# Patient Record
Sex: MALE | Race: BLACK OR AFRICAN AMERICAN | Employment: UNEMPLOYED | URBAN - METROPOLITAN AREA
[De-identification: names, ages, dates, MRNs, and addresses within clinical notes are randomized per-mention and may not be internally consistent; named-entity substitution may affect disease eponyms.]

---

## 2020-01-15 ENCOUNTER — APPOINTMENT (OUTPATIENT)
Dept: GENERAL RADIOLOGY | Age: 45
DRG: 890 | End: 2020-01-15
Payer: MEDICAID

## 2020-01-15 ENCOUNTER — APPOINTMENT (OUTPATIENT)
Dept: ULTRASOUND IMAGING | Age: 45
DRG: 890 | End: 2020-01-15
Payer: MEDICAID

## 2020-01-15 ENCOUNTER — HOSPITAL ENCOUNTER (INPATIENT)
Age: 45
LOS: 22 days | Discharge: ANOTHER HOSPITAL | DRG: 890 | End: 2020-02-06
Attending: EMERGENCY MEDICINE | Admitting: STUDENT IN AN ORGANIZED HEALTH CARE EDUCATION/TRAINING PROGRAM
Payer: MEDICAID

## 2020-01-15 ENCOUNTER — APPOINTMENT (OUTPATIENT)
Dept: INTERVENTIONAL RADIOLOGY/VASCULAR | Age: 45
DRG: 890 | End: 2020-01-15
Payer: MEDICAID

## 2020-01-15 PROBLEM — N17.9 ACUTE RENAL FAILURE (HCC): Status: ACTIVE | Noted: 2020-01-15

## 2020-01-15 LAB
ALBUMIN SERPL-MCNC: 1.6 GM/DL (ref 3.4–5)
ALP BLD-CCNC: 52 IU/L (ref 40–129)
ALT SERPL-CCNC: 12 U/L (ref 10–40)
AMPHETAMINES: NEGATIVE
ANION GAP SERPL CALCULATED.3IONS-SCNC: 13 MMOL/L (ref 4–16)
ANION GAP SERPL CALCULATED.3IONS-SCNC: 16 MMOL/L (ref 4–16)
AST SERPL-CCNC: 21 IU/L (ref 15–37)
BACTERIA: NEGATIVE /HPF
BARBITURATE SCREEN URINE: NEGATIVE
BASOPHILS ABSOLUTE: 0 K/CU MM
BASOPHILS RELATIVE PERCENT: 0.2 % (ref 0–1)
BENZODIAZEPINE SCREEN, URINE: NEGATIVE
BILIRUB SERPL-MCNC: 0.1 MG/DL (ref 0–1)
BILIRUBIN URINE: NEGATIVE MG/DL
BLOOD, URINE: NEGATIVE
BUN BLDV-MCNC: 38 MG/DL (ref 6–23)
BUN BLDV-MCNC: 67 MG/DL (ref 6–23)
CALCIUM SERPL-MCNC: 7.4 MG/DL (ref 8.3–10.6)
CALCIUM SERPL-MCNC: 7.8 MG/DL (ref 8.3–10.6)
CANNABINOID SCREEN URINE: NEGATIVE
CHLORIDE BLD-SCNC: 102 MMOL/L (ref 99–110)
CHLORIDE BLD-SCNC: 104 MMOL/L (ref 99–110)
CHLORIDE URINE RANDOM: 36 MMOL/L (ref 43–210)
CHOLESTEROL: 212 MG/DL
CLARITY: CLEAR
CO2: 16 MMOL/L (ref 21–32)
CO2: 24 MMOL/L (ref 21–32)
COCAINE METABOLITE: NEGATIVE
COLOR: YELLOW
CREAT SERPL-MCNC: 10.9 MG/DL (ref 0.9–1.3)
CREAT SERPL-MCNC: 5.9 MG/DL (ref 0.9–1.3)
CREATININE URINE: 105.7 MG/DL (ref 39–259)
DIFFERENTIAL TYPE: ABNORMAL
EOSINOPHILS ABSOLUTE: 0 K/CU MM
EOSINOPHILS RELATIVE PERCENT: 0 % (ref 0–3)
ESTIMATED AVERAGE GLUCOSE: 128 MG/DL
GFR AFRICAN AMERICAN: 13 ML/MIN/1.73M2
GFR AFRICAN AMERICAN: 6 ML/MIN/1.73M2
GFR NON-AFRICAN AMERICAN: 11 ML/MIN/1.73M2
GFR NON-AFRICAN AMERICAN: 5 ML/MIN/1.73M2
GLUCOSE BLD-MCNC: 110 MG/DL (ref 70–99)
GLUCOSE BLD-MCNC: 112 MG/DL (ref 70–99)
GLUCOSE, URINE: 150 MG/DL
HAV IGM SER IA-ACNC: NON REACTIVE
HBA1C MFR BLD: 6.1 % (ref 4.2–6.3)
HBV SURFACE AB TITR SER: 88.13 {TITER}
HCT VFR BLD CALC: 36.3 % (ref 42–52)
HDLC SERPL-MCNC: 19 MG/DL
HEMOGLOBIN: 10 GM/DL (ref 13.5–18)
HEPATITIS B CORE IGM ANTIBODY: NON REACTIVE
HEPATITIS B SURFACE ANTIGEN: NON REACTIVE
HEPATITIS C ANTIBODY: NON REACTIVE
HYALINE CASTS: 5 /LPF
IMMATURE NEUTROPHIL %: 0.4 % (ref 0–0.43)
INR BLD: 0.97 INDEX
KETONES, URINE: ABNORMAL MG/DL
LACTATE: 1.3 MMOL/L (ref 0.4–2)
LDL CHOLESTEROL DIRECT: 116 MG/DL
LEUKOCYTE ESTERASE, URINE: NEGATIVE
LYMPHOCYTES ABSOLUTE: 0.4 K/CU MM
LYMPHOCYTES RELATIVE PERCENT: 7 % (ref 24–44)
MCH RBC QN AUTO: 22.7 PG (ref 27–31)
MCHC RBC AUTO-ENTMCNC: 27.5 % (ref 32–36)
MCV RBC AUTO: 82.5 FL (ref 78–100)
MONOCYTES ABSOLUTE: 0.1 K/CU MM
MONOCYTES RELATIVE PERCENT: 1.8 % (ref 0–4)
MUCUS: ABNORMAL HPF
NITRITE URINE, QUANTITATIVE: NEGATIVE
NUCLEATED RBC %: 0 %
OPIATES, URINE: NEGATIVE
OXYCODONE: NORMAL
PDW BLD-RTO: 19.9 % (ref 11.7–14.9)
PH, URINE: 7 (ref 5–8)
PHENCYCLIDINE, URINE: NEGATIVE
PLATELET # BLD: 286 K/CU MM (ref 140–440)
PMV BLD AUTO: 10.2 FL (ref 7.5–11.1)
POTASSIUM SERPL-SCNC: 4.4 MMOL/L (ref 3.5–5.1)
POTASSIUM SERPL-SCNC: ABNORMAL MMOL/L (ref 3.5–5.1)
POTASSIUM, UR: 53.3 MMOL/L (ref 22–119)
PROT/CREAT RATIO, UR: >5.7
PROTEIN UA: >500 MG/DL
PROTHROMBIN TIME: 11.7 SECONDS (ref 11.7–14.5)
RAPID INFLUENZA  B AGN: NEGATIVE
RAPID INFLUENZA A AGN: NEGATIVE
RBC # BLD: 4.4 M/CU MM (ref 4.6–6.2)
RBC URINE: 1 /HPF (ref 0–3)
SEGMENTED NEUTROPHILS ABSOLUTE COUNT: 5.1 K/CU MM
SEGMENTED NEUTROPHILS RELATIVE PERCENT: 90.6 % (ref 36–66)
SODIUM BLD-SCNC: 136 MMOL/L (ref 135–145)
SODIUM BLD-SCNC: 139 MMOL/L (ref 135–145)
SODIUM URINE: 44 MMOL/L (ref 35–167)
SPECIFIC GRAVITY UA: 1.02 (ref 1–1.03)
SQUAMOUS EPITHELIAL: 1 /HPF
TOTAL IMMATURE NEUTOROPHIL: 0.02 K/CU MM
TOTAL NUCLEATED RBC: 0 K/CU MM
TOTAL PROTEIN: 5.9 GM/DL (ref 6.4–8.2)
TRANSITIONAL EPITHELIAL: <1 /HPF
TRICHOMONAS: ABNORMAL /HPF
TRIGL SERPL-MCNC: 345 MG/DL
URINE TOTAL PROTEIN: >600 MG/DL
UROBILINOGEN, URINE: NORMAL MG/DL (ref 0.2–1)
WBC # BLD: 5.6 K/CU MM (ref 4–10.5)
WBC UA: 2 /HPF (ref 0–2)

## 2020-01-15 PROCEDURE — 83605 ASSAY OF LACTIC ACID: CPT

## 2020-01-15 PROCEDURE — 84300 ASSAY OF URINE SODIUM: CPT

## 2020-01-15 PROCEDURE — 96374 THER/PROPH/DIAG INJ IV PUSH: CPT

## 2020-01-15 PROCEDURE — 82570 ASSAY OF URINE CREATININE: CPT

## 2020-01-15 PROCEDURE — 84165 PROTEIN E-PHORESIS SERUM: CPT

## 2020-01-15 PROCEDURE — 94640 AIRWAY INHALATION TREATMENT: CPT

## 2020-01-15 PROCEDURE — 85610 PROTHROMBIN TIME: CPT

## 2020-01-15 PROCEDURE — 86038 ANTINUCLEAR ANTIBODIES: CPT

## 2020-01-15 PROCEDURE — 84520 ASSAY OF UREA NITROGEN: CPT

## 2020-01-15 PROCEDURE — 86701 HIV-1ANTIBODY: CPT

## 2020-01-15 PROCEDURE — 81001 URINALYSIS AUTO W/SCOPE: CPT

## 2020-01-15 PROCEDURE — C1894 INTRO/SHEATH, NON-LASER: HCPCS

## 2020-01-15 PROCEDURE — 2580000003 HC RX 258: Performed by: INTERNAL MEDICINE

## 2020-01-15 PROCEDURE — 2500000003 HC RX 250 WO HCPCS: Performed by: INTERNAL MEDICINE

## 2020-01-15 PROCEDURE — 6360000002 HC RX W HCPCS: Performed by: PHYSICIAN ASSISTANT

## 2020-01-15 PROCEDURE — 85025 COMPLETE CBC W/AUTO DIFF WBC: CPT

## 2020-01-15 PROCEDURE — 86704 HEP B CORE ANTIBODY TOTAL: CPT

## 2020-01-15 PROCEDURE — 84166 PROTEIN E-PHORESIS/URINE/CSF: CPT

## 2020-01-15 PROCEDURE — 86325 OTHER IMMUNOELECTROPHORESIS: CPT

## 2020-01-15 PROCEDURE — 86706 HEP B SURFACE ANTIBODY: CPT

## 2020-01-15 PROCEDURE — 76770 US EXAM ABDO BACK WALL COMP: CPT

## 2020-01-15 PROCEDURE — 80048 BASIC METABOLIC PNL TOTAL CA: CPT

## 2020-01-15 PROCEDURE — 6370000000 HC RX 637 (ALT 250 FOR IP): Performed by: NURSE PRACTITIONER

## 2020-01-15 PROCEDURE — 87040 BLOOD CULTURE FOR BACTERIA: CPT

## 2020-01-15 PROCEDURE — 82436 ASSAY OF URINE CHLORIDE: CPT

## 2020-01-15 PROCEDURE — 76775 US EXAM ABDO BACK WALL LIM: CPT

## 2020-01-15 PROCEDURE — 80074 ACUTE HEPATITIS PANEL: CPT

## 2020-01-15 PROCEDURE — 71046 X-RAY EXAM CHEST 2 VIEWS: CPT

## 2020-01-15 PROCEDURE — 87389 HIV-1 AG W/HIV-1&-2 AB AG IA: CPT

## 2020-01-15 PROCEDURE — 6370000000 HC RX 637 (ALT 250 FOR IP): Performed by: PHYSICIAN ASSISTANT

## 2020-01-15 PROCEDURE — 02H633Z INSERTION OF INFUSION DEVICE INTO RIGHT ATRIUM, PERCUTANEOUS APPROACH: ICD-10-PCS | Performed by: RADIOLOGY

## 2020-01-15 PROCEDURE — 84450 TRANSFERASE (AST) (SGOT): CPT

## 2020-01-15 PROCEDURE — 93005 ELECTROCARDIOGRAM TRACING: CPT | Performed by: PHYSICIAN ASSISTANT

## 2020-01-15 PROCEDURE — 5A1D70Z PERFORMANCE OF URINARY FILTRATION, INTERMITTENT, LESS THAN 6 HOURS PER DAY: ICD-10-PCS | Performed by: INTERNAL MEDICINE

## 2020-01-15 PROCEDURE — 84156 ASSAY OF PROTEIN URINE: CPT

## 2020-01-15 PROCEDURE — 90935 HEMODIALYSIS ONE EVALUATION: CPT

## 2020-01-15 PROCEDURE — 87086 URINE CULTURE/COLONY COUNT: CPT

## 2020-01-15 PROCEDURE — 6360000002 HC RX W HCPCS: Performed by: INTERNAL MEDICINE

## 2020-01-15 PROCEDURE — 80061 LIPID PANEL: CPT

## 2020-01-15 PROCEDURE — 86255 FLUORESCENT ANTIBODY SCREEN: CPT

## 2020-01-15 PROCEDURE — 1200000000 HC SEMI PRIVATE

## 2020-01-15 PROCEDURE — 86160 COMPLEMENT ANTIGEN: CPT

## 2020-01-15 PROCEDURE — 2709999900 HC NON-CHARGEABLE SUPPLY

## 2020-01-15 PROCEDURE — 99285 EMERGENCY DEPT VISIT HI MDM: CPT

## 2020-01-15 PROCEDURE — 83721 ASSAY OF BLOOD LIPOPROTEIN: CPT

## 2020-01-15 PROCEDURE — 87340 HEPATITIS B SURFACE AG IA: CPT

## 2020-01-15 PROCEDURE — 86320 SERUM IMMUNOELECTROPHORESIS: CPT

## 2020-01-15 PROCEDURE — C1752 CATH,HEMODIALYSIS,SHORT-TERM: HCPCS

## 2020-01-15 PROCEDURE — 76937 US GUIDE VASCULAR ACCESS: CPT

## 2020-01-15 PROCEDURE — 2580000003 HC RX 258: Performed by: STUDENT IN AN ORGANIZED HEALTH CARE EDUCATION/TRAINING PROGRAM

## 2020-01-15 PROCEDURE — 4500000027

## 2020-01-15 PROCEDURE — 82977 ASSAY OF GGT: CPT

## 2020-01-15 PROCEDURE — 77001 FLUOROGUIDE FOR VEIN DEVICE: CPT

## 2020-01-15 PROCEDURE — 84133 ASSAY OF URINE POTASSIUM: CPT

## 2020-01-15 PROCEDURE — 87804 INFLUENZA ASSAY W/OPTIC: CPT

## 2020-01-15 PROCEDURE — 83883 ASSAY NEPHELOMETRY NOT SPEC: CPT

## 2020-01-15 PROCEDURE — 94761 N-INVAS EAR/PLS OXIMETRY MLT: CPT

## 2020-01-15 PROCEDURE — 86702 HIV-2 ANTIBODY: CPT

## 2020-01-15 PROCEDURE — 80307 DRUG TEST PRSMV CHEM ANLYZR: CPT

## 2020-01-15 PROCEDURE — 83036 HEMOGLOBIN GLYCOSYLATED A1C: CPT

## 2020-01-15 PROCEDURE — 36556 INSERT NON-TUNNEL CV CATH: CPT

## 2020-01-15 PROCEDURE — 80053 COMPREHEN METABOLIC PANEL: CPT

## 2020-01-15 PROCEDURE — 96375 TX/PRO/DX INJ NEW DRUG ADDON: CPT

## 2020-01-15 PROCEDURE — 2580000003 HC RX 258: Performed by: PHYSICIAN ASSISTANT

## 2020-01-15 PROCEDURE — 36415 COLL VENOUS BLD VENIPUNCTURE: CPT

## 2020-01-15 RX ORDER — SODIUM CHLORIDE 0.9 % (FLUSH) 0.9 %
10 SYRINGE (ML) INJECTION PRN
Status: DISCONTINUED | OUTPATIENT
Start: 2020-01-15 | End: 2020-02-06 | Stop reason: HOSPADM

## 2020-01-15 RX ORDER — HEPARIN SODIUM 5000 [USP'U]/ML
5000 INJECTION, SOLUTION INTRAVENOUS; SUBCUTANEOUS EVERY 8 HOURS SCHEDULED
Status: COMPLETED | OUTPATIENT
Start: 2020-01-15 | End: 2020-01-15

## 2020-01-15 RX ORDER — HEPARIN SODIUM 1000 [USP'U]/ML
2600 INJECTION, SOLUTION INTRAVENOUS; SUBCUTANEOUS
Status: COMPLETED | OUTPATIENT
Start: 2020-01-15 | End: 2020-01-15

## 2020-01-15 RX ORDER — ACETAMINOPHEN 325 MG/1
650 TABLET ORAL EVERY 6 HOURS PRN
COMMUNITY

## 2020-01-15 RX ORDER — SODIUM CHLORIDE 0.9 % (FLUSH) 0.9 %
10 SYRINGE (ML) INJECTION EVERY 12 HOURS SCHEDULED
Status: DISCONTINUED | OUTPATIENT
Start: 2020-01-15 | End: 2020-02-06 | Stop reason: HOSPADM

## 2020-01-15 RX ORDER — DEXTROSE MONOHYDRATE 25 G/50ML
25 INJECTION, SOLUTION INTRAVENOUS ONCE
Status: COMPLETED | OUTPATIENT
Start: 2020-01-15 | End: 2020-01-15

## 2020-01-15 RX ORDER — ALBUTEROL SULFATE 2.5 MG/3ML
10 SOLUTION RESPIRATORY (INHALATION) ONCE
Status: COMPLETED | OUTPATIENT
Start: 2020-01-15 | End: 2020-01-15

## 2020-01-15 RX ORDER — AZITHROMYCIN 250 MG/1
TABLET, FILM COATED ORAL
Qty: 1 PACKET | Refills: 0 | Status: SHIPPED | OUTPATIENT
Start: 2020-01-15 | End: 2020-01-15

## 2020-01-15 RX ORDER — HEPARIN SODIUM 5000 [USP'U]/ML
5000 INJECTION, SOLUTION INTRAVENOUS; SUBCUTANEOUS EVERY 8 HOURS SCHEDULED
Status: DISCONTINUED | OUTPATIENT
Start: 2020-01-15 | End: 2020-01-15

## 2020-01-15 RX ORDER — ACETAMINOPHEN 325 MG/1
650 TABLET ORAL EVERY 6 HOURS PRN
Status: DISCONTINUED | OUTPATIENT
Start: 2020-01-15 | End: 2020-02-06 | Stop reason: HOSPADM

## 2020-01-15 RX ORDER — ONDANSETRON 2 MG/ML
4 INJECTION INTRAMUSCULAR; INTRAVENOUS EVERY 6 HOURS PRN
Status: DISCONTINUED | OUTPATIENT
Start: 2020-01-15 | End: 2020-02-06 | Stop reason: HOSPADM

## 2020-01-15 RX ORDER — GUAIFENESIN/DEXTROMETHORPHAN 100-10MG/5
5 SYRUP ORAL ONCE
Status: COMPLETED | OUTPATIENT
Start: 2020-01-15 | End: 2020-01-15

## 2020-01-15 RX ORDER — IBUPROFEN 600 MG/1
600 TABLET ORAL ONCE
Status: COMPLETED | OUTPATIENT
Start: 2020-01-15 | End: 2020-01-15

## 2020-01-15 RX ADMIN — ACETAMINOPHEN 650 MG: 325 TABLET ORAL at 23:39

## 2020-01-15 RX ADMIN — GUAIFENESIN AND DEXTROMETHORPHAN 5 ML: 100; 10 SYRUP ORAL at 06:32

## 2020-01-15 RX ADMIN — DEXTROSE 50 % IN WATER (D50W) INTRAVENOUS SYRINGE 25 G: at 07:49

## 2020-01-15 RX ADMIN — SODIUM CHLORIDE, PRESERVATIVE FREE 10 ML: 5 INJECTION INTRAVENOUS at 22:02

## 2020-01-15 RX ADMIN — Medication: at 08:42

## 2020-01-15 RX ADMIN — HEPARIN SODIUM 5000 UNITS: 5000 INJECTION INTRAVENOUS; SUBCUTANEOUS at 21:54

## 2020-01-15 RX ADMIN — HEPARIN SODIUM 2600 UNITS: 1000 INJECTION, SOLUTION INTRAVENOUS; SUBCUTANEOUS at 18:22

## 2020-01-15 RX ADMIN — IBUPROFEN 600 MG: 600 TABLET, FILM COATED ORAL at 06:32

## 2020-01-15 RX ADMIN — INSULIN HUMAN 10 UNITS: 100 INJECTION, SOLUTION PARENTERAL at 07:50

## 2020-01-15 RX ADMIN — ALBUTEROL SULFATE 10 MG: 2.5 SOLUTION RESPIRATORY (INHALATION) at 08:13

## 2020-01-15 SDOH — HEALTH STABILITY: MENTAL HEALTH: HOW OFTEN DO YOU HAVE A DRINK CONTAINING ALCOHOL?: NEVER

## 2020-01-15 ASSESSMENT — PAIN SCALES - GENERAL
PAINLEVEL_OUTOF10: 0
PAINLEVEL_OUTOF10: 5
PAINLEVEL_OUTOF10: 5

## 2020-01-15 ASSESSMENT — PAIN DESCRIPTION - PAIN TYPE: TYPE: ACUTE PAIN

## 2020-01-15 ASSESSMENT — PAIN DESCRIPTION - LOCATION: LOCATION: GENERALIZED

## 2020-01-15 NOTE — ED NOTES
Currently, Dr Sangeeta Brewster and Dr Stephanie Steele are both at bedside speaking with patient     Shey Camara RN  01/15/20 6092

## 2020-01-15 NOTE — ED PROVIDER NOTES
Metabolic Panel   Result Value Ref Range    Sodium 136 135 - 145 MMOL/L    Potassium (HH) 3.5 - 5.1 MMOL/L     5.9  K CALLED TO DAI PEREZ @ 5108 [id number] BY DAMARIS HERRERA  RESULTS READ BACK      Chloride 104 99 - 110 mMol/L    CO2 16 (L) 21 - 32 MMOL/L    BUN 67 (H) 6 - 23 MG/DL    CREATININE 10.9 (H) 0.9 - 1.3 MG/DL    Glucose 112 (H) 70 - 99 MG/DL    Calcium 7.8 (L) 8.3 - 10.6 MG/DL    Alb 1.6 (L) 3.4 - 5.0 GM/DL    Total Protein 5.9 (L) 6.4 - 8.2 GM/DL    Total Bilirubin 0.1 0.0 - 1.0 MG/DL    ALT 12 10 - 40 U/L    AST 21 15 - 37 IU/L    Alkaline Phosphatase 52 40 - 129 IU/L    GFR Non-African American 5 (L) >60 mL/min/1.73m2    GFR  6 (L) >60 mL/min/1.73m2    Anion Gap 16 4 - 16   Urinalysis   Result Value Ref Range    Color, UA YELLOW YELLOW    Clarity, UA CLEAR CLEAR    Glucose, Urine 150 (A) NEGATIVE MG/DL    Bilirubin Urine NEGATIVE NEGATIVE MG/DL    Ketones, Urine SMALL (A) NEGATIVE MG/DL    Specific Gravity, UA 1.024 1.001 - 1.035    Blood, Urine NEGATIVE NEGATIVE    pH, Urine 7.0 5.0 - 8.0    Protein, UA >500 (HH) NEGATIVE MG/DL    Urobilinogen, Urine NORMAL 0.2 - 1.0 MG/DL    Nitrite Urine, Quantitative NEGATIVE NEGATIVE    Leukocyte Esterase, Urine NEGATIVE NEGATIVE    RBC, UA 1 0 - 3 /HPF    WBC, UA 2 0 - 2 /HPF    Bacteria, UA NEGATIVE NEGATIVE /HPF    Squam Epithel, UA 1 /HPF    Trans Epithel, UA <1 /HPF    Mucus, UA RARE (A) NEGATIVE HPF    Trichomonas, UA NONE SEEN NONE SEEN /HPF    Hyaline Casts, UA 5 /LPF   EKG 12 Lead   Result Value Ref Range    Ventricular Rate 129 BPM    Atrial Rate 129 BPM    P-R Interval 162 ms    QRS Duration 66 ms    Q-T Interval 290 ms    QTc Calculation (Bazett) 424 ms    P Axis 60 degrees    R Axis 57 degrees    T Axis 117 degrees    Diagnosis       Sinus tachycardia  Nonspecific T wave abnormality  Abnormal ECG  No previous ECGs available           IMAGING:  XR CHEST STANDARD (2 VW)   Final Result   No acute cardiopulmonary abnormality.
is no tenderness to palpation over the external bladder   Musculoskeletal: 5 out of 5 strength in all 4 extremities full flexion extension abduction and adduction supination pronation of all extremities and all digits. No obvious muscle atrophy is noted. No focal muscle deficits are appreciated  Dermatology: Skin is warm and dry there is no obvious abscesses lacerations or lesions noted  Psych: Mentation is grossly normal cognition is grossly normal. Affect is appropriate  Neuro: Motor intact sensory intact cranial nerves II through XII are intact level of consciousness is normal cerebellar function is normal reflexes are grossly normal. No evidence of incontinence or loss of bowel or bladder no saddle anesthesia noted Lymphatic: There is no submandibular or cervical adenopathy appreciated. I have reviewed and interpreted all of the currently available lab results from this visit (if applicable):  No results found for this visit on 01/15/20. Radiographs (if obtained):  [] The following radiograph was interpreted by myself in the absence of a radiologist:   [] Radiologist's Report Reviewed:  XR CHEST STANDARD (2 VW)    (Results Pending)       EKG (if obtained):   Please See Note of attending physician for EKG interpretation. Chart review shows recent radiograph(s):  No results found. MDM:     0615  I have signed out Nan Bess Jr.'s Emergency Department care to [facility]  We discussed the pertinent history, physical exam, completed/pending test results (if applicable) and current treatment plan. Please refer to his/her chart for the patients remaining Emergency Department course and final disposition. BP (!) 127/101   Pulse 105   Temp 97.5 °F (36.4 °C) (Oral)   SpO2 99%       Clinical Impression:    1. Elevated serum creatinine    2. History of fever    3. Cough    4. Hyperkalemia    5.  Renal insufficiency        Disposition referral (if applicable):  No follow-up provider

## 2020-01-15 NOTE — ED NOTES
Report received from [employer] in IR. Patient is returning to the Ed.      Josy, RN  01/15/20 0656

## 2020-01-15 NOTE — PROGRESS NOTES
dw pt and will arrange renal biopsy Thursday and need find etiology and pt aware risk and benfit and agree.

## 2020-01-15 NOTE — PLAN OF CARE
Problem: Fluid Volume:  Goal: Hemodynamic stability will improve  Description  Hemodynamic stability will improve  Outcome: Ongoing  Goal: Ability to maintain a balanced intake and output will improve  Description  Ability to maintain a balanced intake and output will improve  Outcome: Ongoing  Goal: Ability to achieve a balanced intake and output will improve  Description  Ability to achieve a balanced intake and output will improve  Outcome: Ongoing     Problem: Health Behavior:  Goal: Identification of resources available to assist in meeting health care needs will improve  Description  Identification of resources available to assist in meeting health care needs will improve  Outcome: Ongoing     Problem: Respiratory:  Goal: Respiratory status will improve  Description  Respiratory status will improve  Outcome: Ongoing     Problem: Physical Regulation:  Goal: Ability to maintain clinical measurements within normal limits will improve  Description  Ability to maintain clinical measurements within normal limits will improve  Outcome: Ongoing  Goal: Will show no signs and symptoms of electrolyte imbalance  Description  Will show no signs and symptoms of electrolyte imbalance  Outcome: Ongoing     Problem:  Activity:  Goal: Fatigue will decrease  Description  Fatigue will decrease  Outcome: Ongoing  Goal: Risk for activity intolerance will decrease  Description  Risk for activity intolerance will decrease  Outcome: Ongoing     Problem: Coping:  Goal: Ability to cope will improve  Description  Ability to cope will improve  Outcome: Ongoing     Problem: Nutritional:  Goal: Ability to identify appropriate dietary choices will improve  Description  Ability to identify appropriate dietary choices will improve  Outcome: Ongoing

## 2020-01-15 NOTE — ED NOTES
Report called to [facility] SURGICAL [facility] RN for room 1400 [facility], [address] [place] [address]  01/15/20 9668

## 2020-01-15 NOTE — CONSULTS
Nephrology Service Consultation    Patient:  Carl Hardin. MRN: [id number]  Consulting physician:  Starr Bryant DO  Reason for Consult: DWIGHT with nephrotic syndrome     History Obtained From:  patient  PCP: No primary care provider on file. HISTORY OF PRESENT ILLNESS:   The patient is a 45 y.o. male who was transported to [facility] ED on 1/15    Patient presents to the ED with intermittent fevers for a month with   Accompanying diminished appetite and reported weight loss   Ying Barnard reports that he has been feeling unwell since November 2019. His medical history is rather sparse. He reports previously being  Treated for H.pylori and also having a history of childhood UTI's. Most recently he reports that he has been adding a   Supplement Lawa (sp?) to his coffee which he would   drink when he was having a fever. 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 He denies any recent use   Of NSAID's. He denies any known history of TB or known exposure to TB    It is reported that patient is originally from [facility] and has resided  In the [address] [place], [place] [address] for approximately 3 years. REVIEW OF SYSTEMS:  The ten point review of systems   are negative except as mentioned above. Medical History: previous treatment for H. Pylori / childhood UTI's    SOCIAL HISTORY:   Tobacco: denies use     Alcohol: denies use     Recreational Drug Use: history of cannabis use     Demographic History; prior to admission:      Review of pertinent lab work and diagnostics available thus far:   Review of recent chest X-ray from 1/15/2020: The cardiac silhouette is within normal limits for size. There is no focal  consolidation, pleural effusion or pneumothorax. The visualized osseous  structures demonstrate no acute abnormality. Medications:   Scheduled Meds:  Continuous Infusions:   IV infusion builder 100 mL/hr at 01/15/20 0842     PRN Meds:.     Allergies:  Patient has no known allergies. Family History:   History reviewed. No pertinent family history. Physical Exam:    Vitals: BP (!) 127/90   Pulse 107   Temp 100.2 °F (37.9 °C) (Oral)   Resp 18   SpO2 100%     General appearance:  awake and verbally interactive   HEENT: Head: Normal, normocephalic, atraumatic. Neck: supple, symmetrical, trachea midline  Cardiovascular: S1 and S2: normal / no rub  Pulmonary: diminished lung sounds bilaterally  Abdomen:  soft / non-tender   Extremities: + edema to the bilateral lower legs     Dialysis Catheter: (anatomical right side of neck):   no visible signs of infection to site. CBC:   Recent Labs     01/15/20  0643   WBC 5.6   HGB 10.0*        BMP:    Recent Labs     01/15/20  0643      K 5.9  K CALLED TO DAI PEREZ @ 0715 [id number] BY DAMARIS MOLINAT  RESULTS READ BACK  *      CO2 16*   BUN 67*   CREATININE 10.9*   GLUCOSE 112*     Hepatic:   Recent Labs     01/15/20  0643   AST 21   ALT 12   BILITOT 0.1   ALKPHOS 52     Troponin: No results for input(s): TROPONINI in the last 72 hours. Mg, Phos: No results for input(s): MG, PHOS in the last 72 hours. ABGs: No results found for: PHART, PO2ART, VZM7ACC  INR:   Recent Labs     01/15/20  0830   INR 0.97     -----------------------------------------------------------------  Patient Active Problem List   Diagnosis Code    Acute renal failure ([facility] [address] N17.9     Assessment and Recommendations     Impression   1. DWIGHT with nephrotic syndrome  -consider the following etiologies for DWIGHT: intravascular volume depletion  Patient has significant proteinuria without hematuria; consider FSGS  As well as MACY. He has no previous history of DM.    -also consider exposure to nephrotoxic agents and cannot exclude systemic infection     2. Hyperkalemia   3. Unintentional Weight loss / Fatigue / diminished appetite / reported fevers   4. Anemia   5.   Hypocalcemia     PLAN   1.    -UA: negative blood / greater than 500 albumin / UPC: 5.7 grams    -Review of microscopic urinalysis demonstrated bland sediment.  -temp HD catheter has been placed   -bladder scan ordered to screen for urinary retention   -on sodium bicarb at 100 / hour   -renal US ordered to characterize kidneys / evaluate for signs of obstruction   -serologies ordered and currently in process   2. -BMP this afternoon to recheck serum potassium   -currently on bicarb drip   3.   -blood and urine culture results pending   -continue workup for systemic illness   4.   -latest Hb: 10.0; follow hemoglobin trend   5.   -latest serum calcium: 7.8 / corrected calcium 9.7      Patient with DWIGHT with unknown baseline creatinine. He has significant albuminuria based on plain UA without hematuria. Anticipate kidney biopsy when patient is stable in order    Assess the cause for his proteinuria and impaired renal function. Will continue to monitor renal status over the next 24 hours and   If not improving, will likely proceed with RRT. Electronically signed by URBANO Urena - CNP    Nephrology Attending Progress Note  1/15/2020 11:28 AM  Subjective:   Admit Date: 1/15/2020  PCP: No primary care provider on file. Interval History: I have personally performed face to face diagnostic evaluation on this patient. I have personally reviewed pertinent labs and imaging and agree with the care plan above. My additional findings are as follows: Pt present with weakness and cough in setting arf and high K with no prior hx and large kidneys needing acute dialysis and work up with diagnosis. Objective:   Vitals: BP (!) 127/90   Pulse 107   Temp 100.2 °F (37.9 °C) (Oral)   Resp 18   SpO2 100%   Awake weak  Soft nt  Trace edema    Assessment and Plan:  IMP:  As stated above    Plan     1 bp stable  2 place temp hd line and do dialysis once today  3 do serology work up and when stable will plan do renal biopsy, concern with large kidney if myeloma or cancer as well. 4 fu lipid and may need statin  5 correct electrolyte  6 correct ca stable   Admit and do work up and will follow  Check drug screen and denies nsaid or drug use             Electronically signed by Radhika Amaya MD on 1/15/2020 at 11:28 AM

## 2020-01-15 NOTE — PROGRESS NOTES
Pt seen and examined in er with no medical hx but weak and cough for 1 month and now arf with high K and met acidosis with nephrotic syndrome and low serum albumin. Will need check lipid and start asa but only after do renal biopsy and serology work up. For today will place temp hd line. Check renal us and do dialysis X1 as work with pt who is anxious on best plan of care. shayy Alanis. Will follow with full note.

## 2020-01-16 ENCOUNTER — APPOINTMENT (OUTPATIENT)
Dept: GENERAL RADIOLOGY | Age: 45
DRG: 890 | End: 2020-01-16
Payer: MEDICAID

## 2020-01-16 ENCOUNTER — APPOINTMENT (OUTPATIENT)
Dept: CT IMAGING | Age: 45
DRG: 890 | End: 2020-01-16
Payer: MEDICAID

## 2020-01-16 LAB
ALBUMIN SERPL-MCNC: 1.8 GM/DL (ref 3.4–5)
ANION GAP SERPL CALCULATED.3IONS-SCNC: 16 MMOL/L (ref 4–16)
BASOPHILS ABSOLUTE: 0 K/CU MM
BASOPHILS RELATIVE PERCENT: 0.1 % (ref 0–1)
BUN BLDV-MCNC: 54 MG/DL (ref 6–23)
CALCIUM SERPL-MCNC: 7.4 MG/DL (ref 8.3–10.6)
CHLORIDE BLD-SCNC: 103 MMOL/L (ref 99–110)
CO2: 20 MMOL/L (ref 21–32)
CREAT SERPL-MCNC: 8.6 MG/DL (ref 0.9–1.3)
CULTURE: NORMAL
DIFFERENTIAL TYPE: ABNORMAL
EOSINOPHILS ABSOLUTE: 0 K/CU MM
EOSINOPHILS RELATIVE PERCENT: 0 % (ref 0–3)
FERRITIN: 3694 NG/ML (ref 30–400)
GFR AFRICAN AMERICAN: 8 ML/MIN/1.73M2
GFR NON-AFRICAN AMERICAN: 7 ML/MIN/1.73M2
GLUCOSE BLD-MCNC: 111 MG/DL (ref 70–99)
HCT VFR BLD CALC: 27.2 % (ref 42–52)
HEMOGLOBIN: ABNORMAL GM/DL (ref 13.5–18)
HEPATITIS B CORE TOTAL ANTIBODY: ABNORMAL
HEPATITIS B CORE TOTAL ANTIBODY: POSITIVE
IMMATURE NEUTROPHIL %: 0.4 % (ref 0–0.43)
INR BLD: 0.98 INDEX
IRON: 26 UG/DL (ref 59–158)
LYMPHOCYTES ABSOLUTE: 0.5 K/CU MM
LYMPHOCYTES RELATIVE PERCENT: 7.1 % (ref 24–44)
Lab: NORMAL
MCH RBC QN AUTO: 22.7 PG (ref 27–31)
MCHC RBC AUTO-ENTMCNC: 30.5 % (ref 32–36)
MCV RBC AUTO: 74.3 FL (ref 78–100)
MONOCYTES ABSOLUTE: 0.2 K/CU MM
MONOCYTES RELATIVE PERCENT: 2.4 % (ref 0–4)
PCT TRANSFERRIN: 24 % (ref 10–44)
PDW BLD-RTO: 19 % (ref 11.7–14.9)
PHOSPHORUS: 7.5 MG/DL (ref 2.5–4.9)
PLATELET # BLD: 333 K/CU MM (ref 140–440)
PMV BLD AUTO: 10.6 FL (ref 7.5–11.1)
POTASSIUM SERPL-SCNC: 5 MMOL/L (ref 3.5–5.1)
PROTHROMBIN TIME: 11.9 SECONDS (ref 11.7–14.5)
RBC # BLD: 3.66 M/CU MM (ref 4.6–6.2)
SEGMENTED NEUTROPHILS ABSOLUTE COUNT: 6 K/CU MM
SEGMENTED NEUTROPHILS RELATIVE PERCENT: 90 % (ref 36–66)
SODIUM BLD-SCNC: 139 MMOL/L (ref 135–145)
SPECIMEN: NORMAL
TOTAL IMMATURE NEUTOROPHIL: 0.03 K/CU MM
TOTAL IRON BINDING CAPACITY: 109 UG/DL (ref 250–450)
UNSATURATED IRON BINDING CAPACITY: 83 UG/DL (ref 110–370)
WBC # BLD: 6.7 K/CU MM (ref 4–10.5)

## 2020-01-16 PROCEDURE — 2709999900 HC NON-CHARGEABLE SUPPLY

## 2020-01-16 PROCEDURE — 85610 PROTHROMBIN TIME: CPT

## 2020-01-16 PROCEDURE — 76937 US GUIDE VASCULAR ACCESS: CPT

## 2020-01-16 PROCEDURE — 82728 ASSAY OF FERRITIN: CPT

## 2020-01-16 PROCEDURE — 2580000003 HC RX 258: Performed by: STUDENT IN AN ORGANIZED HEALTH CARE EDUCATION/TRAINING PROGRAM

## 2020-01-16 PROCEDURE — 6370000000 HC RX 637 (ALT 250 FOR IP): Performed by: NURSE PRACTITIONER

## 2020-01-16 PROCEDURE — 85025 COMPLETE CBC W/AUTO DIFF WBC: CPT

## 2020-01-16 PROCEDURE — 74176 CT ABD & PELVIS W/O CONTRAST: CPT

## 2020-01-16 PROCEDURE — 71045 X-RAY EXAM CHEST 1 VIEW: CPT

## 2020-01-16 PROCEDURE — 80069 RENAL FUNCTION PANEL: CPT

## 2020-01-16 PROCEDURE — C1769 GUIDE WIRE: HCPCS

## 2020-01-16 PROCEDURE — 83540 ASSAY OF IRON: CPT

## 2020-01-16 PROCEDURE — 1200000000 HC SEMI PRIVATE

## 2020-01-16 PROCEDURE — 36415 COLL VENOUS BLD VENIPUNCTURE: CPT

## 2020-01-16 PROCEDURE — 83550 IRON BINDING TEST: CPT

## 2020-01-16 RX ADMIN — SODIUM CHLORIDE, PRESERVATIVE FREE 10 ML: 5 INJECTION INTRAVENOUS at 10:43

## 2020-01-16 RX ADMIN — SODIUM CHLORIDE, PRESERVATIVE FREE 10 ML: 5 INJECTION INTRAVENOUS at 22:01

## 2020-01-16 RX ADMIN — ACETAMINOPHEN 650 MG: 325 TABLET ORAL at 06:38

## 2020-01-16 RX ADMIN — ACETAMINOPHEN 650 MG: 325 TABLET ORAL at 19:04

## 2020-01-16 ASSESSMENT — PAIN SCALES - GENERAL
PAINLEVEL_OUTOF10: 0

## 2020-01-16 NOTE — CONSULTS
Consult to IV team for poor access. #20 angio inserted into left forearm with ultrasound guidance on first attempt without difficulty. Patient tolerated well.  Bernardino Pore

## 2020-01-16 NOTE — PROGRESS NOTES
Nephrology Progress Note  1/16/2020 6:22 AM  Subjective:   Admit Date: 1/15/2020    PCP: No primary care provider on file. Interval History: Patient reports improving appetite since admission   -Patient reports tolerating dialysis yesterday without any problems.    -anticipate kidney biopsy today     Diet: Diet NPO Effective Now    Data:   Scheduled Meds:   sodium chloride flush  10 mL Intravenous 2 times per day     Continuous Infusions:  PRN Meds:sodium chloride flush, magnesium hydroxide, ondansetron, acetaminophen  I/O last 3 completed shifts: In: 510 [I.V.:10]  Out: 500   No intake/output data recorded. Intake/Output Summary (Last 24 hours) at 1/16/2020 0622  Last data filed at 1/15/2020 2202  Gross per 24 hour   Intake 510 ml   Output 500 ml   Net 10 ml     CBC:   Recent Labs     01/15/20  0643   WBC 5.6   HGB 10.0*        BMP:    Recent Labs     01/15/20  0643 01/15/20  1827    139   K 5.9  K CALLED TO DAI GOLD PA @ 0715 [id number] BY DAMARIS MLT  RESULTS READ BACK  * 4.4    102   CO2 16* 24   BUN 67* 38*   CREATININE 10.9* 5.9*   GLUCOSE 112* 110*     Hepatic:   Recent Labs     01/15/20  0643   AST 21   ALT 12   BILITOT 0.1   ALKPHOS 52     Troponin: No results for input(s): TROPONINI in the last 72 hours. BNP: No results for input(s): BNP in the last 72 hours.   Lipids:   Recent Labs     01/15/20  0830   CHOL 212*   HDL 19*     ABGs: No results found for: PHART, PO2ART, [id number]  INR:   Recent Labs     01/15/20  0830   INR 0.97     Renal Labs  Albumin:    Lab Results   Component Value Date    LABALBU 1.6 01/15/2020     Calcium:    Lab Results   Component Value Date    CALCIUM 7.4 01/15/2020     Phosphorus:  No results found for: PHOS  U/A:    Lab Results   Component Value Date    NITRU NEGATIVE 01/15/2020    COLORU YELLOW 01/15/2020    WBCUA 2 01/15/2020    RBCUA 1 01/15/2020    MUCUS RARE 01/15/2020    TRICHOMONAS NONE SEEN 01/15/2020    BACTERIA NEGATIVE 01/15/2020 and currently in process   -anticipate kidney biopsy today   2.   -latest K: 4.4  3.   -blood and urine culture results pending   -pursue workup for systemic illness   4. -CBC qam; follow trend, especially after kidney biopsy   5.  -latest serum calcium: 7.4 / corrected calcium 9.3    Review of renal ultrasound from 1/15: The right kidney measures 13 cm in length   and the left kidney measures 13 cm in length. The kidneys are hyperechoic. Nava Bellis is no hydronephrosis or shadowing stone.     Electronically signed by URBANO Urena - CNP          Nephrology Attending Progress Note  1/16/2020 3:32 PM  Subjective:   Admit Date: 1/15/2020  PCP: No primary care provider on file. Interval History: I have personally performed face to face diagnostic evaluation on this patient. I have personally reviewed pertinent labs and imaging and agree with the care plan above. My additional findings are as follows:   Pt with drop hb not able do ct abdomen today, fu hb in am and will monitor, better after hd X1    Objective:   Vitals: /84   Pulse 102   Temp 99.1 °F (37.3 °C) (Oral)   Resp 26   Wt 170 lb 3.1 oz (77.2 kg)   SpO2 100%   awkae weak  Soft nt  No edema    Assessment and Plan:  IMP:  As stated above    Plan     1 bp stable  2 fu stool blood not iron def and if hb > 8 can plan renal biopsy in am, not felt HUS/TTP as plt stable  3 work up etiology nephrotic syndrome  4 K monitor  5 fu bmp and possible dialysis in am  Fu ct abdomen today  Will follow           Electronically signed by Natalie Mckeon MD on 1/16/2020 at 3:32 PM

## 2020-01-16 NOTE — PROGRESS NOTES
Hospitalist Progress Note      Name:  Sarah Franco /Age/Sex: 1975  (45 y.o. male)   MRN & CSN:  [id number] & [id number] Admission Date/Time: 1/15/2020  4:05 AM   Location:  [address] [place] [address] PCP: No primary care provider on file. Hospital Day: 2    Assessment and Plan:   Sarah Franco is a 45 y.o.  male  who presents with:    · Acute DWIGHT/ATN without unknown etiology. · [place] ethnicity, works for the [employer] and has been in [employer] for ~3 years.     DENIES - DM, HTN, HLD, stroke, MI, IVDU. DENIES - regular medical care and has no primarily care physician     Waiting for hiv conformation  Continue dialysis per nephrology  Renal bx in AM  MANY labs still pending at this point     Diet Diet NPO, After Midnight Exceptions are: Sips with Meds  DIET RENAL;  Dietary Nutrition Supplements: Renal Oral Supplement   DVT Prophylaxis [] Lovenox, []  Heparin, [] SCDs, []No VTE prophylaxis, patient ambulating   GI Prophylaxis [] PPI, [] H2 Blocker, [] No GI prophylaxis, patient is receiving diet/Tube Feeds   Code Status Full Code   Disposition Patient requires continued admission due to renal bx and pending work up    Dc to home when able   MDM [] Low, [] Moderate,[]  High  Patient's risk as above due to     [] One or more chronic illnesses with mild exacerbation progression    [] Two or more stable chronic illnesses    [x] Undiagnosed new problem with uncertain prognosis    [] Elective major surgery    []Prescription drug management       History of Present Illness:     Pt S&E. No family at bedside  Denies cp sob and is not diaphoretic today  Dialysis went well and he feels less fatigue     10-14 point ROS reviewed negative, unless as noted above    Objective:        Intake/Output Summary (Last 24 hours) at 2020 1743  Last data filed at 2020 0631  Gross per 24 hour   Intake 510 ml   Output 1100 ml   Net -590 ml      Vitals:   Vitals:    20 1434   BP: 117/84   Pulse: 102   Resp: 26   Temp: 99.1 °F (37.3 °C)   SpO2:      Physical Exam:    GEN Awake male, laying in bed in no apparent distress. Appears given age. EYES Pupils are equally round. No scleral discharge  HENT Atraumatic and symmetric head  NECK No apparent thyromegaly  RESP Symmetric chest movement while on room air. CARDIO/VASC Peripheral pulses equal bilaterally and palpable. No peripheral edema. GI Abdomen is not distended. Rectal exam deferred.  Mcgarry catheter is not present. HEME/LYMPH No petechiae or ecchymoses. MSK Spontaneous movement of BL upper extremities  SKIN Normal coloration, warm, dry. NEURO Cranial nerves appear grossly intact  PSYCH Awake, alert.     Medications:   Medications:    sodium chloride flush  10 mL Intravenous 2 times per day      Infusions:   PRN Meds: sodium chloride flush, 10 mL, PRN  magnesium hydroxide, 30 mL, Daily PRN  ondansetron, 4 mg, Q6H PRN  acetaminophen, 650 mg, Q6H PRN      Electronically signed by Tracey Lima DO on 1/16/2020 at 5:43 PM

## 2020-01-16 NOTE — PROGRESS NOTES
Pt tolerated 2 hr HD treatment well. No fluid removed. Pt educated on procedure, pt verbalized understanding. No distress noted. R temp HD cath ran well. Both ports Heparin locked and capped per policy. Report given to [facility].

## 2020-01-17 ENCOUNTER — APPOINTMENT (OUTPATIENT)
Dept: INTERVENTIONAL RADIOLOGY/VASCULAR | Age: 45
DRG: 890 | End: 2020-01-17
Payer: MEDICAID

## 2020-01-17 ENCOUNTER — APPOINTMENT (OUTPATIENT)
Dept: CT IMAGING | Age: 45
DRG: 890 | End: 2020-01-17
Payer: MEDICAID

## 2020-01-17 LAB
ALBUMIN ELP: 1.1 GM/DL (ref 3.2–5.6)
ALBUMIN SERPL-MCNC: 1.8 GM/DL (ref 3.4–5)
ALBUMIN, U: 40 %
ALPHA-1-GLOBULIN, U: 13 %
ALPHA-1-GLOBULIN: 0.3 GM/DL (ref 0.1–0.4)
ALPHA-2-GLOBULIN, U: 14 %
ALPHA-2-GLOBULIN: 1.9 GM/DL (ref 0.4–1.2)
ANION GAP SERPL CALCULATED.3IONS-SCNC: 16 MMOL/L (ref 4–16)
ANTI-NUCLEAR ANTIBODY (ANA): NORMAL
ANTI-NUCLEAR ANTIBODY (ANA): NORMAL
APTT: 30.7 SECONDS (ref 25.1–37.1)
BASOPHILS ABSOLUTE: 0 K/CU MM
BASOPHILS RELATIVE PERCENT: 0.1 % (ref 0–1)
BETA GLOBULIN, U: 16 %
BETA GLOBULIN: 0.7 GM/DL (ref 0.5–1.3)
BUN BLDV-MCNC: 65 MG/DL (ref 6–23)
CALCIUM SERPL-MCNC: 7.2 MG/DL (ref 8.3–10.6)
CHLORIDE BLD-SCNC: 102 MMOL/L (ref 99–110)
CO2: 20 MMOL/L (ref 21–32)
COMPLEMENT C3: 111 MG/DL (ref 88–201)
COMPLEMENT C3: NORMAL MG/DL (ref 88–201)
COMPLEMENT C4: 35 MG/DL (ref 10–40)
COMPLEMENT C4: NORMAL MG/DL (ref 10–40)
CREAT SERPL-MCNC: 9.2 MG/DL (ref 0.9–1.3)
DIFFERENTIAL TYPE: ABNORMAL
EKG ATRIAL RATE: 129 BPM
EKG DIAGNOSIS: NORMAL
EKG P AXIS: 60 DEGREES
EKG P-R INTERVAL: 162 MS
EKG Q-T INTERVAL: 290 MS
EKG QRS DURATION: 66 MS
EKG QTC CALCULATION (BAZETT): 424 MS
EKG R AXIS: 57 DEGREES
EKG T AXIS: 117 DEGREES
EKG VENTRICULAR RATE: 129 BPM
EOSINOPHILS ABSOLUTE: 0 K/CU MM
EOSINOPHILS RELATIVE PERCENT: 0.2 % (ref 0–3)
GAMMA GLOBULIN, U: 17 %
GAMMA GLOBULIN: 0.6 GM/DL (ref 0.5–1.6)
GFR AFRICAN AMERICAN: 8 ML/MIN/1.73M2
GFR NON-AFRICAN AMERICAN: 6 ML/MIN/1.73M2
GLUCOSE BLD-MCNC: 101 MG/DL (ref 70–99)
HCT VFR BLD CALC: 27.5 % (ref 42–52)
HEMOGLOBIN: 8.3 GM/DL (ref 13.5–18)
HIV 1 ANTIBODY: POSITIVE
HIV 1,2 COMBO ANTIGEN/ANTIBODY: ABNORMAL
HIV 1,2 COMBO ANTIGEN/ANTIBODY: REACTIVE
HIV INTERPRETATION: ABNORMAL
HIV INTERPRETATION: ABNORMAL
HIV-1/HIV-2 ANTIBODY DIFFERENTATION: ABNORMAL
HIV-1/HIV-2 ANTIBODY DIFFERENTATION: ABNORMAL
HIV-2 AB: NEGATIVE
IMMATURE NEUTROPHIL %: 0.7 % (ref 0–0.43)
INR BLD: 0.93 INDEX
LYMPHOCYTES ABSOLUTE: 1.4 K/CU MM
LYMPHOCYTES RELATIVE PERCENT: 16.5 % (ref 24–44)
MCH RBC QN AUTO: 22.6 PG (ref 27–31)
MCHC RBC AUTO-ENTMCNC: 30.2 % (ref 32–36)
MCV RBC AUTO: 74.9 FL (ref 78–100)
MONOCYTES ABSOLUTE: 0.2 K/CU MM
MONOCYTES RELATIVE PERCENT: 2.5 % (ref 0–4)
NEUTROPHIL CYTOPLASMIC AB IGG: NORMAL
NEUTROPHIL CYTOPLASMIC AB IGG: NORMAL
NUCLEATED RBC %: 0 %
PDW BLD-RTO: 19.4 % (ref 11.7–14.9)
PHOSPHORUS: 7.6 MG/DL (ref 2.5–4.9)
PLATELET # BLD: 370 K/CU MM (ref 140–440)
PMV BLD AUTO: 10.8 FL (ref 7.5–11.1)
POTASSIUM SERPL-SCNC: 4.8 MMOL/L (ref 3.5–5.1)
PROTHROMBIN TIME: 11.2 SECONDS (ref 11.7–14.5)
RBC # BLD: 3.67 M/CU MM (ref 4.6–6.2)
SEGMENTED NEUTROPHILS ABSOLUTE COUNT: 6.8 K/CU MM
SEGMENTED NEUTROPHILS RELATIVE PERCENT: 80 % (ref 36–66)
SODIUM BLD-SCNC: 138 MMOL/L (ref 135–145)
SPEP INTERPRETATION: ABNORMAL
SPEP INTERPRETATION: ABNORMAL
SPEP INTERPRETATION: NORMAL
SPEP INTERPRETATION: NORMAL
TOTAL IMMATURE NEUTOROPHIL: 0.06 K/CU MM
TOTAL NUCLEATED RBC: 0 K/CU MM
TOTAL PROTEIN: 4.6 GM/DL (ref 6.4–8.2)
URINE TOTAL PROTEIN: >600 MG/DL
URINE TOTAL PROTEIN: >600 MG/DL
WBC # BLD: 8.4 K/CU MM (ref 4–10.5)

## 2020-01-17 PROCEDURE — 2709999900 CT BIOPSY RENAL

## 2020-01-17 PROCEDURE — 0TB13ZX EXCISION OF LEFT KIDNEY, PERCUTANEOUS APPROACH, DIAGNOSTIC: ICD-10-PCS | Performed by: RADIOLOGY

## 2020-01-17 PROCEDURE — 6370000000 HC RX 637 (ALT 250 FOR IP): Performed by: STUDENT IN AN ORGANIZED HEALTH CARE EDUCATION/TRAINING PROGRAM

## 2020-01-17 PROCEDURE — 2580000003 HC RX 258: Performed by: STUDENT IN AN ORGANIZED HEALTH CARE EDUCATION/TRAINING PROGRAM

## 2020-01-17 PROCEDURE — 90935 HEMODIALYSIS ONE EVALUATION: CPT

## 2020-01-17 PROCEDURE — 88307 TISSUE EXAM BY PATHOLOGIST: CPT

## 2020-01-17 PROCEDURE — 80069 RENAL FUNCTION PANEL: CPT

## 2020-01-17 PROCEDURE — 85610 PROTHROMBIN TIME: CPT

## 2020-01-17 PROCEDURE — 6360000002 HC RX W HCPCS: Performed by: INTERNAL MEDICINE

## 2020-01-17 PROCEDURE — 6370000000 HC RX 637 (ALT 250 FOR IP): Performed by: INTERNAL MEDICINE

## 2020-01-17 PROCEDURE — 85025 COMPLETE CBC W/AUTO DIFF WBC: CPT

## 2020-01-17 PROCEDURE — 1200000000 HC SEMI PRIVATE

## 2020-01-17 PROCEDURE — 93010 ELECTROCARDIOGRAM REPORT: CPT | Performed by: INTERNAL MEDICINE

## 2020-01-17 PROCEDURE — 36415 COLL VENOUS BLD VENIPUNCTURE: CPT

## 2020-01-17 PROCEDURE — 6360000002 HC RX W HCPCS: Performed by: RADIOLOGY

## 2020-01-17 PROCEDURE — 2580000003 HC RX 258: Performed by: INTERNAL MEDICINE

## 2020-01-17 PROCEDURE — 85730 THROMBOPLASTIN TIME PARTIAL: CPT

## 2020-01-17 RX ORDER — SEVELAMER CARBONATE 800 MG/1
800 TABLET, FILM COATED ORAL
Status: DISCONTINUED | OUTPATIENT
Start: 2020-01-17 | End: 2020-02-06 | Stop reason: HOSPADM

## 2020-01-17 RX ORDER — SODIUM BICARBONATE 650 MG/1
650 TABLET ORAL 3 TIMES DAILY
Status: DISCONTINUED | OUTPATIENT
Start: 2020-01-17 | End: 2020-01-23

## 2020-01-17 RX ORDER — METOPROLOL TARTRATE 50 MG/1
50 TABLET, FILM COATED ORAL 2 TIMES DAILY
Status: DISCONTINUED | OUTPATIENT
Start: 2020-01-17 | End: 2020-02-06 | Stop reason: HOSPADM

## 2020-01-17 RX ORDER — HEPARIN SODIUM 1000 [USP'U]/ML
2600 INJECTION, SOLUTION INTRAVENOUS; SUBCUTANEOUS
Status: COMPLETED | OUTPATIENT
Start: 2020-01-17 | End: 2020-01-17

## 2020-01-17 RX ORDER — ATORVASTATIN CALCIUM 40 MG/1
40 TABLET, FILM COATED ORAL NIGHTLY
Status: DISCONTINUED | OUTPATIENT
Start: 2020-01-17 | End: 2020-02-06 | Stop reason: HOSPADM

## 2020-01-17 RX ORDER — MIDAZOLAM HYDROCHLORIDE 1 MG/ML
1 INJECTION INTRAMUSCULAR; INTRAVENOUS ONCE
Status: COMPLETED | OUTPATIENT
Start: 2020-01-17 | End: 2020-01-17

## 2020-01-17 RX ORDER — CALCITRIOL 0.25 UG/1
0.5 CAPSULE, LIQUID FILLED ORAL DAILY
Status: DISCONTINUED | OUTPATIENT
Start: 2020-01-17 | End: 2020-02-06 | Stop reason: HOSPADM

## 2020-01-17 RX ORDER — SODIUM CHLORIDE 9 MG/ML
INJECTION, SOLUTION INTRAVENOUS CONTINUOUS
Status: DISCONTINUED | OUTPATIENT
Start: 2020-01-17 | End: 2020-01-18

## 2020-01-17 RX ORDER — FENTANYL CITRATE 50 UG/ML
50 INJECTION, SOLUTION INTRAMUSCULAR; INTRAVENOUS ONCE
Status: COMPLETED | OUTPATIENT
Start: 2020-01-17 | End: 2020-01-17

## 2020-01-17 RX ADMIN — METOPROLOL TARTRATE 50 MG: 50 TABLET, FILM COATED ORAL at 20:19

## 2020-01-17 RX ADMIN — HEPARIN SODIUM 2600 UNITS: 1000 INJECTION, SOLUTION INTRAVENOUS; SUBCUTANEOUS at 18:02

## 2020-01-17 RX ADMIN — SODIUM CHLORIDE, PRESERVATIVE FREE 10 ML: 5 INJECTION INTRAVENOUS at 20:19

## 2020-01-17 RX ADMIN — FENTANYL CITRATE 50 MCG: 50 INJECTION INTRAMUSCULAR; INTRAVENOUS at 15:02

## 2020-01-17 RX ADMIN — ATORVASTATIN CALCIUM 40 MG: 40 TABLET, FILM COATED ORAL at 20:19

## 2020-01-17 RX ADMIN — SODIUM CHLORIDE: 9 INJECTION, SOLUTION INTRAVENOUS at 20:22

## 2020-01-17 RX ADMIN — MIDAZOLAM 1 MG: 1 INJECTION INTRAMUSCULAR; INTRAVENOUS at 15:02

## 2020-01-17 RX ADMIN — EPOETIN ALFA-EPBX 10000 UNITS: 10000 INJECTION, SOLUTION INTRAVENOUS; SUBCUTANEOUS at 17:27

## 2020-01-17 RX ADMIN — SODIUM BICARBONATE 650 MG TABLET 650 MG: at 20:19

## 2020-01-17 ASSESSMENT — PAIN SCALES - GENERAL
PAINLEVEL_OUTOF10: 0
PAINLEVEL_OUTOF10: 2
PAINLEVEL_OUTOF10: 0

## 2020-01-17 NOTE — PROGRESS NOTES
I checked on Mr Gisela Bell in dialysis. He is normotensive and denies pain. Patient will remain on his left side for the duration of dialysis, but is doing well post procedurally.     Ino Gray MD  Interventional Radiology

## 2020-01-17 NOTE — PLAN OF CARE
Problem: Fluid Volume:  Goal: Hemodynamic stability will improve  Description  Hemodynamic stability will improve  Outcome: Ongoing  Goal: Ability to maintain a balanced intake and output will improve  Description  Ability to maintain a balanced intake and output will improve  Outcome: Ongoing  Goal: Ability to achieve a balanced intake and output will improve  Description  Ability to achieve a balanced intake and output will improve  Outcome: Ongoing     Problem: Health Behavior:  Goal: Identification of resources available to assist in meeting health care needs will improve  Description  Identification of resources available to assist in meeting health care needs will improve  Outcome: Ongoing     Problem: Respiratory:  Goal: Respiratory status will improve  Description  Respiratory status will improve  Outcome: Ongoing     Problem: Physical Regulation:  Goal: Ability to maintain clinical measurements within normal limits will improve  Description  Ability to maintain clinical measurements within normal limits will improve  Outcome: Ongoing  Goal: Will show no signs and symptoms of electrolyte imbalance  Description  Will show no signs and symptoms of electrolyte imbalance  Outcome: Ongoing     Problem:  Activity:  Goal: Fatigue will decrease  Description  Fatigue will decrease  Outcome: Ongoing  Goal: Risk for activity intolerance will decrease  Description  Risk for activity intolerance will decrease  Outcome: Ongoing     Problem: Coping:  Goal: Ability to cope will improve  Description  Ability to cope will improve  Outcome: Ongoing     Problem: Nutritional:  Goal: Ability to identify appropriate dietary choices will improve  Description  Ability to identify appropriate dietary choices will improve  Outcome: Ongoing     Problem: Falls - Risk of:  Goal: Will remain free from falls  Description  Will remain free from falls  Outcome: Ongoing  Goal: Absence of physical injury  Description  Absence of physical injury  Outcome: Ongoing

## 2020-01-17 NOTE — PROGRESS NOTES
Nephrology Progress Note  1/17/2020 11:01 AM  Subjective:   Admit Date: 1/15/2020    PCP: No primary care provider on file. Interval History:  Patient reports improving appetite.   -kidney biopsy planned for today; discussed with patient. Diet: Diet NPO, After Midnight Exceptions are: Sips with Meds  Dietary Nutrition Supplements: Renal Oral Supplement    Data:   Scheduled Meds:   metoprolol tartrate  50 mg Oral BID    sodium chloride flush  10 mL Intravenous 2 times per day     Continuous Infusions:   sodium chloride       PRN Meds:sodium chloride flush, magnesium hydroxide, ondansetron, acetaminophen  No intake/output data recorded. No intake/output data recorded. No intake or output data in the 24 hours ending 01/17/20 1101  CBC:   Recent Labs     01/15/20  0643 01/16/20  0641 01/17/20  0711   WBC 5.6 6.7 8.4   HGB 10.0* 8.3  HGB CALLED TO SAVANNAH FONG RN @ [phone] BY DAMARIS HERRERA  RESULTS READ BACK  * 8.3*    333 370     BMP:    Recent Labs     01/15/20  1827 01/16/20  0641 01/17/20  0711    139 138   K 4.4 5.0 4.8    103 102   CO2 24 20* 20*   BUN 38* 54* 65*   CREATININE 5.9* 8.6* 9.2*   GLUCOSE 110* 111* 101*     Hepatic:   Recent Labs     01/15/20  0643   AST 21   ALT 12   BILITOT 0.1   ALKPHOS 52     Troponin: No results for input(s): TROPONINI in the last 72 hours. BNP: No results for input(s): BNP in the last 72 hours.   Lipids:   Recent Labs     01/15/20  0830   CHOL 212*   HDL 19*     ABGs: No results found for: PHART, PO2ART, FEY0BAI  INR:   Recent Labs     01/15/20  0830 01/16/20  0641   INR 0.97 0.98     Renal Labs  Albumin:    Lab Results   Component Value Date    LABALBU 1.8 01/17/2020    LABALBU 40 01/15/2020     Calcium:    Lab Results   Component Value Date    CALCIUM 7.2 01/17/2020     Phosphorus:    Lab Results   Component Value Date    PHOS 7.6 01/17/2020     U/A:    Lab Results   Component Value Date    NITRU NEGATIVE 01/15/2020    [facility] [address] [place] [address] [phone]  www. Donal Acosta MD, Lab. Director         Objective:   Patient Active Problem List:     Acute renal failure (HCC)    /81   Pulse 92   Temp 97.8 °F (36.6 °C) (Oral)   Resp 26   Ht 5' 2\" (1.575 m)   Wt 162 lb 0.6 oz (73.5 kg)   SpO2 99%   BMI 29.64 kg/m²      General appearance:  awake and verbally interactive   HEENT: Head: Normal, normocephalic, atraumatic. Neck: supple, symmetrical, trachea midline  Cardiovascular: S1 and S2: normal / no rub  Pulmonary: diminished lung sounds bilaterally  Abdomen:  soft / non-tender   Extremities: + edema to the bilateral lower legs      Dialysis Catheter: (anatomical right side of neck):   no visible signs of infection to site. Impression and Plan:    Impression   1. DWIGHT with nephrotic syndrome  -consider the following etiologies for DWIGHT: intravascular volume depletion  Patient has significant proteinuria without hematuria; has no previous history of DM.    -also consider exposure to nephrotoxic agents and cannot exclude systemic illness      2. Hyperkalemia   3. Unintentional Weight loss / Fatigue / diminished appetite / reported fevers   4. Anemia   5. Hypocalcemia      PLAN   1.   -serum creatinine continues to trend higher post-HD   -please measure and document all urine output  -please see discussion below   -kidney biopsy planned for today   2   -latest serum potassium: 4.8; following trend  3.   -preliminary urine and blood cultures; demonstrated no growth   4.   -latest Hb: 8.3; follow hemoglobin trend   -CBC qam   5.   -latest serum calcium: 7.2 / corrected calcium 9.0    Discussed with patient the rationale for kidney biopsy.   -He was initially hesitant to proceed stating that he consumed  Soda this morning. Biopsy tentatively scheduled for this afternoon.      Electronically signed by URBANO Urena - CNP          Impression   1.  Duodenal wall thickening with surrounding stranding concerning

## 2020-01-17 NOTE — PROGRESS NOTES
Patient in CT 2 for medical renal biopsy, left flank. Draped and prepped in sterile fashion. Monitors applied, O2 via NC @ 3 lpm.    Time out completed, procedure sedation/procedure starts. Multiple CT scans completed, Site marked by CT tech. Physician gets access to left renal site via guidance. Pathologist and Cytologist present to collect renal cores. 18 ga renal cores x 4, absorbable gel foam injected intto site before needle removed. 18 ga needle removed digital pressure held by physician for 4 minutes. Post op orders received. DSD to site, D/I. Patient moved to bed, patient will be positioned laying on left side during AC dialylsis. Patient transported to dialysis. Reports called to floor RN and dialysis RN.  VSS

## 2020-01-17 NOTE — PROGRESS NOTES
I was notified by Nurse Ana Valdes that CT tech  reports that the patient  the patient had  Dizzy spells and a brief shivering episode lasting 10seconds that CT tech states it looks like a seizure. Reports patient did not loose consciousness and no alteration in mental status post shivering. I followed up to talk to the patient, patient reports he was too cold in the ct room and was shivering and did not have any seizure. He denies any seizure history in the past.   Plan  -Neuro check now then q4x2. If normal, then DC.  If abnormal, velma notify NP for a head ct.  -Up with with assistance  Plan discussed with Nurse Ana Valdes

## 2020-01-17 NOTE — PROGRESS NOTES
Patient came down for ct scan. Before getting out of chair said\" I feel dizzy\", seemed to have a seizure. I called Rn, relayed what had happened. Patient did not fall or have any injury. After seizure patient was fine.  JK/ CT/ 2015

## 2020-01-17 NOTE — PLAN OF CARE
Problem: Fluid Volume:  Goal: Hemodynamic stability will improve  Description  Hemodynamic stability will improve  1/17/2020 0734 by Vinod Wolf RN  Outcome: Ongoing     Problem: Fluid Volume:  Goal: Ability to maintain a balanced intake and output will improve  Description  Ability to maintain a balanced intake and output will improve  1/17/2020 0734 by Vinod Wolf RN  Outcome: Ongoing     Problem: Fluid Volume:  Goal: Ability to achieve a balanced intake and output will improve  Description  Ability to achieve a balanced intake and output will improve  1/17/2020 0734 by Vinod Wolf RN  Outcome: Ongoing     Problem: Health Behavior:  Goal: Identification of resources available to assist in meeting health care needs will improve  Description  Identification of resources available to assist in meeting health care needs will improve  1/17/2020 0734 by Vinod Wolf RN  Outcome: Ongoing     Problem: Respiratory:  Goal: Respiratory status will improve  Description  Respiratory status will improve  1/17/2020 0734 by Vinod Wolf RN  Outcome: Ongoing     Problem: Physical Regulation:  Goal: Ability to maintain clinical measurements within normal limits will improve  Description  Ability to maintain clinical measurements within normal limits will improve  1/17/2020 0734 by Vinod Wolf RN  Outcome: Ongoing     Problem: Physical Regulation:  Goal: Will show no signs and symptoms of electrolyte imbalance  Description  Will show no signs and symptoms of electrolyte imbalance  1/17/2020 0734 by Vinod Wolf RN  Outcome: Ongoing     Problem: Activity:  Goal: Fatigue will decrease  Description  Fatigue will decrease  1/17/2020 0734 by Vinod Wolf RN  Outcome: Ongoing     Problem:  Activity:  Goal: Risk for activity intolerance will decrease  Description  Risk for activity intolerance will decrease  1/17/2020 0734 by Vinod Wolf RN  Outcome: Ongoing     Problem: Coping:  Goal: Ability to cope will improve  Description  Ability to cope will improve  1/17/2020 0734 by Elvin Bueno RN  Outcome: Ongoing     Problem: Nutritional:  Goal: Ability to identify appropriate dietary choices will improve  Description  Ability to identify appropriate dietary choices will improve  1/17/2020 0734 by Elvin Bueno RN  Outcome: Ongoing     Problem: Falls - Risk of:  Goal: Will remain free from falls  Description  Will remain free from falls  1/17/2020 0734 by Elvin Bueno RN  Outcome: Ongoing     Problem: Falls - Risk of:  Goal: Absence of physical injury  Description  Absence of physical injury  1/17/2020 0734 by Elvin Bueno RN  Outcome: Ongoing

## 2020-01-18 LAB
ALBUMIN SERPL-MCNC: 1.5 GM/DL (ref 3.4–5)
ANION GAP SERPL CALCULATED.3IONS-SCNC: 9 MMOL/L (ref 4–16)
BASOPHILS ABSOLUTE: 0 K/CU MM
BASOPHILS RELATIVE PERCENT: 0.3 % (ref 0–1)
BUN BLDV-MCNC: 45 MG/DL (ref 6–23)
CALCIUM SERPL-MCNC: ABNORMAL MG/DL (ref 8.3–10.6)
CHLORIDE BLD-SCNC: 102 MMOL/L (ref 99–110)
CO2: 24 MMOL/L (ref 21–32)
CREAT SERPL-MCNC: 6.7 MG/DL (ref 0.9–1.3)
DIFFERENTIAL TYPE: ABNORMAL
EKG ATRIAL RATE: 124 BPM
EKG DIAGNOSIS: NORMAL
EKG P AXIS: 54 DEGREES
EKG P-R INTERVAL: 162 MS
EKG Q-T INTERVAL: 334 MS
EKG QRS DURATION: 82 MS
EKG QTC CALCULATION (BAZETT): 479 MS
EKG R AXIS: 62 DEGREES
EKG T AXIS: 150 DEGREES
EKG VENTRICULAR RATE: 124 BPM
EOSINOPHILS ABSOLUTE: 0 K/CU MM
EOSINOPHILS RELATIVE PERCENT: 0.3 % (ref 0–3)
GFR AFRICAN AMERICAN: 11 ML/MIN/1.73M2
GFR NON-AFRICAN AMERICAN: 9 ML/MIN/1.73M2
GLUCOSE BLD-MCNC: 112 MG/DL (ref 70–99)
HCT VFR BLD CALC: 24.1 % (ref 42–52)
HEMOGLOBIN: ABNORMAL GM/DL (ref 13.5–18)
IMMATURE NEUTROPHIL %: 0.8 % (ref 0–0.43)
LACTIC ACID, SEPSIS: 1.8 MMOL/L (ref 0.5–1.9)
LYMPHOCYTES ABSOLUTE: 0.5 K/CU MM
LYMPHOCYTES RELATIVE PERCENT: 11.7 % (ref 24–44)
MCH RBC QN AUTO: 22.5 PG (ref 27–31)
MCHC RBC AUTO-ENTMCNC: 25.7 % (ref 32–36)
MCV RBC AUTO: 87.3 FL (ref 78–100)
MONOCYTES ABSOLUTE: 0.1 K/CU MM
MONOCYTES RELATIVE PERCENT: 2.8 % (ref 0–4)
NUCLEATED RBC %: 0 %
PDW BLD-RTO: 19.9 % (ref 11.7–14.9)
PHOSPHORUS: 5.6 MG/DL (ref 2.5–4.9)
PLATELET # BLD: 219 K/CU MM (ref 140–440)
PMV BLD AUTO: 11 FL (ref 7.5–11.1)
POTASSIUM SERPL-SCNC: 4.2 MMOL/L (ref 3.5–5.1)
PROCALCITONIN: 4.27
RBC # BLD: 2.76 M/CU MM (ref 4.6–6.2)
SEGMENTED NEUTROPHILS ABSOLUTE COUNT: 3.3 K/CU MM
SEGMENTED NEUTROPHILS RELATIVE PERCENT: 84.1 % (ref 36–66)
SODIUM BLD-SCNC: 135 MMOL/L (ref 135–145)
TOTAL IMMATURE NEUTOROPHIL: 0.03 K/CU MM
TOTAL NUCLEATED RBC: 0 K/CU MM
WBC # BLD: 3.9 K/CU MM (ref 4–10.5)

## 2020-01-18 PROCEDURE — 6360000002 HC RX W HCPCS

## 2020-01-18 PROCEDURE — 6370000000 HC RX 637 (ALT 250 FOR IP): Performed by: STUDENT IN AN ORGANIZED HEALTH CARE EDUCATION/TRAINING PROGRAM

## 2020-01-18 PROCEDURE — P9047 ALBUMIN (HUMAN), 25%, 50ML: HCPCS | Performed by: NURSE PRACTITIONER

## 2020-01-18 PROCEDURE — 85018 HEMOGLOBIN: CPT

## 2020-01-18 PROCEDURE — 6370000000 HC RX 637 (ALT 250 FOR IP): Performed by: INTERNAL MEDICINE

## 2020-01-18 PROCEDURE — 83605 ASSAY OF LACTIC ACID: CPT

## 2020-01-18 PROCEDURE — 90935 HEMODIALYSIS ONE EVALUATION: CPT

## 2020-01-18 PROCEDURE — 93005 ELECTROCARDIOGRAM TRACING: CPT

## 2020-01-18 PROCEDURE — P9016 RBC LEUKOCYTES REDUCED: HCPCS

## 2020-01-18 PROCEDURE — 87581 M.PNEUMON DNA AMP PROBE: CPT

## 2020-01-18 PROCEDURE — 2580000003 HC RX 258: Performed by: INTERNAL MEDICINE

## 2020-01-18 PROCEDURE — 86922 COMPATIBILITY TEST ANTIGLOB: CPT

## 2020-01-18 PROCEDURE — 84145 PROCALCITONIN (PCT): CPT

## 2020-01-18 PROCEDURE — 87633 RESP VIRUS 12-25 TARGETS: CPT

## 2020-01-18 PROCEDURE — 86850 RBC ANTIBODY SCREEN: CPT

## 2020-01-18 PROCEDURE — 6360000002 HC RX W HCPCS: Performed by: INTERNAL MEDICINE

## 2020-01-18 PROCEDURE — 6360000002 HC RX W HCPCS: Performed by: NURSE PRACTITIONER

## 2020-01-18 PROCEDURE — 87798 DETECT AGENT NOS DNA AMP: CPT

## 2020-01-18 PROCEDURE — 2580000003 HC RX 258: Performed by: STUDENT IN AN ORGANIZED HEALTH CARE EDUCATION/TRAINING PROGRAM

## 2020-01-18 PROCEDURE — 87486 CHLMYD PNEUM DNA AMP PROBE: CPT

## 2020-01-18 PROCEDURE — 6370000000 HC RX 637 (ALT 250 FOR IP): Performed by: NURSE PRACTITIONER

## 2020-01-18 PROCEDURE — 36415 COLL VENOUS BLD VENIPUNCTURE: CPT

## 2020-01-18 PROCEDURE — 83970 ASSAY OF PARATHORMONE: CPT

## 2020-01-18 PROCEDURE — 93010 ELECTROCARDIOGRAM REPORT: CPT | Performed by: INTERNAL MEDICINE

## 2020-01-18 PROCEDURE — 2580000003 HC RX 258: Performed by: NURSE PRACTITIONER

## 2020-01-18 PROCEDURE — 36430 TRANSFUSION BLD/BLD COMPNT: CPT

## 2020-01-18 PROCEDURE — 2000000000 HC ICU R&B

## 2020-01-18 PROCEDURE — 86900 BLOOD TYPING SEROLOGIC ABO: CPT

## 2020-01-18 PROCEDURE — 86901 BLOOD TYPING SEROLOGIC RH(D): CPT

## 2020-01-18 PROCEDURE — 80069 RENAL FUNCTION PANEL: CPT

## 2020-01-18 PROCEDURE — 85025 COMPLETE CBC W/AUTO DIFF WBC: CPT

## 2020-01-18 RX ORDER — LORAZEPAM 2 MG/ML
INJECTION INTRAMUSCULAR
Status: COMPLETED
Start: 2020-01-18 | End: 2020-01-18

## 2020-01-18 RX ORDER — ALBUMIN (HUMAN) 12.5 G/50ML
12.5 SOLUTION INTRAVENOUS EVERY 6 HOURS
Status: DISCONTINUED | OUTPATIENT
Start: 2020-01-18 | End: 2020-01-18

## 2020-01-18 RX ORDER — 0.9 % SODIUM CHLORIDE 0.9 %
250 INTRAVENOUS SOLUTION INTRAVENOUS ONCE
Status: DISCONTINUED | OUTPATIENT
Start: 2020-01-18 | End: 2020-02-06 | Stop reason: HOSPADM

## 2020-01-18 RX ORDER — ALBUMIN (HUMAN) 12.5 G/50ML
25 SOLUTION INTRAVENOUS EVERY 6 HOURS
Status: DISCONTINUED | OUTPATIENT
Start: 2020-01-18 | End: 2020-01-18 | Stop reason: CLARIF

## 2020-01-18 RX ORDER — FUROSEMIDE 10 MG/ML
80 INJECTION INTRAMUSCULAR; INTRAVENOUS ONCE
Status: COMPLETED | OUTPATIENT
Start: 2020-01-18 | End: 2020-01-18

## 2020-01-18 RX ORDER — SODIUM CHLORIDE 9 MG/ML
INJECTION, SOLUTION INTRAVENOUS ONCE
Status: COMPLETED | OUTPATIENT
Start: 2020-01-18 | End: 2020-01-18

## 2020-01-18 RX ORDER — LORAZEPAM 2 MG/ML
0.5 INJECTION INTRAMUSCULAR ONCE
Status: COMPLETED | OUTPATIENT
Start: 2020-01-18 | End: 2020-01-18

## 2020-01-18 RX ORDER — ZIPRASIDONE MESYLATE 20 MG/ML
INJECTION, POWDER, LYOPHILIZED, FOR SOLUTION INTRAMUSCULAR
Status: COMPLETED
Start: 2020-01-18 | End: 2020-01-18

## 2020-01-18 RX ORDER — FUROSEMIDE 10 MG/ML
INJECTION INTRAMUSCULAR; INTRAVENOUS
Status: COMPLETED
Start: 2020-01-18 | End: 2020-01-18

## 2020-01-18 RX ORDER — LORAZEPAM 2 MG/ML
0.5 INJECTION INTRAMUSCULAR ONCE
Status: DISCONTINUED | OUTPATIENT
Start: 2020-01-18 | End: 2020-02-04

## 2020-01-18 RX ORDER — 0.9 % SODIUM CHLORIDE 0.9 %
1000 INTRAVENOUS SOLUTION INTRAVENOUS ONCE
Status: COMPLETED | OUTPATIENT
Start: 2020-01-18 | End: 2020-01-18

## 2020-01-18 RX ORDER — HEPARIN SODIUM 1000 [USP'U]/ML
2600 INJECTION, SOLUTION INTRAVENOUS; SUBCUTANEOUS
Status: COMPLETED | OUTPATIENT
Start: 2020-01-18 | End: 2020-01-18

## 2020-01-18 RX ORDER — MIDODRINE HYDROCHLORIDE 5 MG/1
10 TABLET ORAL ONCE
Status: COMPLETED | OUTPATIENT
Start: 2020-01-18 | End: 2020-01-18

## 2020-01-18 RX ORDER — ZIPRASIDONE MESYLATE 20 MG/ML
5 INJECTION, POWDER, LYOPHILIZED, FOR SOLUTION INTRAMUSCULAR ONCE
Status: COMPLETED | OUTPATIENT
Start: 2020-01-18 | End: 2020-01-18

## 2020-01-18 RX ADMIN — CALCIUM GLUCONATE 1 G: 98 INJECTION, SOLUTION INTRAVENOUS at 06:04

## 2020-01-18 RX ADMIN — FUROSEMIDE 80 MG: 10 INJECTION, SOLUTION INTRAMUSCULAR; INTRAVENOUS at 08:38

## 2020-01-18 RX ADMIN — ZIPRASIDONE MESYLATE 5 MG: 20 INJECTION, POWDER, LYOPHILIZED, FOR SOLUTION INTRAMUSCULAR at 11:16

## 2020-01-18 RX ADMIN — IRON SUCROSE 100 MG: 20 INJECTION, SOLUTION INTRAVENOUS at 11:49

## 2020-01-18 RX ADMIN — METOPROLOL TARTRATE 50 MG: 50 TABLET, FILM COATED ORAL at 21:16

## 2020-01-18 RX ADMIN — LORAZEPAM 0.5 MG: 2 INJECTION INTRAMUSCULAR at 09:01

## 2020-01-18 RX ADMIN — SODIUM CHLORIDE 1000 ML: 9 INJECTION, SOLUTION INTRAVENOUS at 01:08

## 2020-01-18 RX ADMIN — FUROSEMIDE 80 MG: 10 INJECTION INTRAMUSCULAR; INTRAVENOUS at 08:38

## 2020-01-18 RX ADMIN — ATORVASTATIN CALCIUM 40 MG: 40 TABLET, FILM COATED ORAL at 21:16

## 2020-01-18 RX ADMIN — SODIUM CHLORIDE: 9 INJECTION, SOLUTION INTRAVENOUS at 06:58

## 2020-01-18 RX ADMIN — SODIUM CHLORIDE: 9 INJECTION, SOLUTION INTRAVENOUS at 02:11

## 2020-01-18 RX ADMIN — ACETAMINOPHEN 650 MG: 325 TABLET ORAL at 00:02

## 2020-01-18 RX ADMIN — SODIUM CHLORIDE, PRESERVATIVE FREE 10 ML: 5 INJECTION INTRAVENOUS at 21:17

## 2020-01-18 RX ADMIN — LORAZEPAM 0.5 MG: 2 INJECTION, SOLUTION INTRAMUSCULAR; INTRAVENOUS at 09:15

## 2020-01-18 RX ADMIN — MIDODRINE HYDROCHLORIDE 10 MG: 5 TABLET ORAL at 03:50

## 2020-01-18 RX ADMIN — SODIUM BICARBONATE 650 MG TABLET 650 MG: at 21:16

## 2020-01-18 RX ADMIN — EPOETIN ALFA-EPBX 6000 UNITS: 3000 INJECTION, SOLUTION INTRAVENOUS; SUBCUTANEOUS at 11:49

## 2020-01-18 RX ADMIN — HEPARIN SODIUM 2600 UNITS: 1000 INJECTION, SOLUTION INTRAVENOUS; SUBCUTANEOUS at 12:20

## 2020-01-18 RX ADMIN — ALBUMIN (HUMAN) 12.5 G: 0.25 INJECTION, SOLUTION INTRAVENOUS at 08:13

## 2020-01-18 RX ADMIN — ALBUMIN (HUMAN) 12.5 G: 0.25 INJECTION, SOLUTION INTRAVENOUS at 07:17

## 2020-01-18 RX ADMIN — LORAZEPAM 0.5 MG: 2 INJECTION INTRAMUSCULAR; INTRAVENOUS at 09:01

## 2020-01-18 ASSESSMENT — PAIN DESCRIPTION - PAIN TYPE: TYPE: ACUTE PAIN

## 2020-01-18 ASSESSMENT — PAIN SCALES - GENERAL
PAINLEVEL_OUTOF10: 0
PAINLEVEL_OUTOF10: 2
PAINLEVEL_OUTOF10: 2

## 2020-01-18 NOTE — PROGRESS NOTES
(!) 32   Temp:    SpO2: 95%     Physical Exam:    none    Medications:   Medications:    0.9 % sodium chloride  250 mL Intravenous Once    LORazepam  0.5 mg Intravenous Once    metoprolol tartrate  50 mg Oral BID    calcium acetate  1,334 mg Oral TID WC    sevelamer  800 mg Oral TID WC    sodium bicarbonate  650 mg Oral TID    calcitRIOL  0.5 mcg Oral Daily    atorvastatin  40 mg Oral Nightly    sodium chloride flush  10 mL Intravenous 2 times per day      Infusions:   PRN Meds: sodium chloride flush, 10 mL, PRN  magnesium hydroxide, 30 mL, Daily PRN  ondansetron, 4 mg, Q6H PRN  acetaminophen, 650 mg, Q6H PRN      Electronically signed by Hermelindo Arceo DO on 1/18/2020 at 1:33 PM

## 2020-01-18 NOTE — PROGRESS NOTES
Pt called nurse at 0040 to [facility] down and help\". On arrival pt stated that he felt dizzy and like he was going to \"pass out\". He was saying that the room was too cold but he was diaphoretic. Vitals taken at this time, temp 101.5, BP 88/62, HR 89, MAP 71, RR 19. Tylenol already given for left flank pain. During episode, pt states, \"I'm going to be in trouble tonight. \" Hospitalist notified, 1L NS bolus ordered. Hebert Tapia NP came to bedside around 0120 to assess pt. Bolus given, additional labs and exams ordered for the AM. Stat H&H ordered. Hiram Dey RN attempted to draw lab but was unsuccessful. Lab notified to of additional labs. After bolus, /88, HR 86, MAP 95, temp 98.6. Pt states that he is feeling better now. Pt bed returned to normal. Pt given a blanket and made comfortable with call light in reach. Will continue to monitor.  Electronically signed by Ruth Bass LPN on 5/42/8174 at 5:09 AM

## 2020-01-18 NOTE — PROGRESS NOTES
I was notified that the patients BP dropped to 76/40s with associated dizziness. H and H from this am 8.3/27.5. S/P renal biopsy today. Will repeat H and H, Give 1L NS bolus. Still with fever 101 this shift. Give tylenol. Add lactate and pro calcitonin to morning labs. Continue to monitor.

## 2020-01-18 NOTE — PROGRESS NOTES
Pt tolerated 2.5 hr HD treatment well, removing 500 ml. No complaints voiced. R temp CVC ran well. Both ports heparin locked and capped per policy. Report given to RN.

## 2020-01-19 ENCOUNTER — APPOINTMENT (OUTPATIENT)
Dept: GENERAL RADIOLOGY | Age: 45
DRG: 890 | End: 2020-01-19
Payer: MEDICAID

## 2020-01-19 ENCOUNTER — APPOINTMENT (OUTPATIENT)
Dept: CT IMAGING | Age: 45
DRG: 890 | End: 2020-01-19
Payer: MEDICAID

## 2020-01-19 LAB
ABO/RH: NORMAL
ALBUMIN SERPL-MCNC: 2.3 GM/DL (ref 3.4–5)
ANION GAP SERPL CALCULATED.3IONS-SCNC: 16 MMOL/L (ref 4–16)
ANTIBODY SCREEN: NEGATIVE
BASE EXCESS MIXED: ABNORMAL (ref 0–1.2)
BASOPHILS ABSOLUTE: 0 K/CU MM
BASOPHILS RELATIVE PERCENT: 0.1 % (ref 0–1)
BUN BLDV-MCNC: 41 MG/DL (ref 6–23)
CALCIUM SERPL-MCNC: ABNORMAL MG/DL (ref 8.3–10.6)
CARBON MONOXIDE, BLOOD: 1.3 % (ref 0–5)
CHLORIDE BLD-SCNC: 97 MMOL/L (ref 99–110)
CO2 CONTENT: 27.7 MMOL/L (ref 19–24)
CO2: 26 MMOL/L (ref 21–32)
COMMENT: ABNORMAL
COMPONENT: NORMAL
CREAT SERPL-MCNC: 6.8 MG/DL (ref 0.9–1.3)
CROSSMATCH RESULT: NORMAL
DIFFERENTIAL TYPE: ABNORMAL
EOSINOPHILS ABSOLUTE: 0 K/CU MM
EOSINOPHILS RELATIVE PERCENT: 0.1 % (ref 0–3)
GFR AFRICAN AMERICAN: 11 ML/MIN/1.73M2
GFR NON-AFRICAN AMERICAN: 9 ML/MIN/1.73M2
GLUCOSE BLD-MCNC: 99 MG/DL (ref 70–99)
HCO3 ARTERIAL: 26.5 MMOL/L (ref 18–23)
HCT VFR BLD CALC: 24.2 % (ref 42–52)
HEMOGLOBIN: 7.3 GM/DL (ref 13.5–18)
IMMATURE NEUTROPHIL %: 0.9 % (ref 0–0.43)
LYMPHOCYTES ABSOLUTE: 1 K/CU MM
LYMPHOCYTES RELATIVE PERCENT: 12.6 % (ref 24–44)
MCH RBC QN AUTO: 23.9 PG (ref 27–31)
MCHC RBC AUTO-ENTMCNC: 30.2 % (ref 32–36)
MCV RBC AUTO: 79.1 FL (ref 78–100)
METHEMOGLOBIN ARTERIAL: 1.3 %
MONOCYTES ABSOLUTE: 0.2 K/CU MM
MONOCYTES RELATIVE PERCENT: 2.3 % (ref 0–4)
NUCLEATED RBC %: 0 %
O2 SATURATION: 94.4 % (ref 96–97)
PARATHYROID HORMONE INTACT: 262 PG/ML (ref 15–65)
PARATHYROID HORMONE INTACT: ABNORMAL PG/ML (ref 15–65)
PCO2 ARTERIAL: 39 MMHG (ref 32–45)
PDW BLD-RTO: 19.4 % (ref 11.7–14.9)
PH BLOOD: 7.44 (ref 7.34–7.45)
PHOSPHORUS: 3.8 MG/DL (ref 2.5–4.9)
PLATELET # BLD: 261 K/CU MM (ref 140–440)
PMV BLD AUTO: 11.2 FL (ref 7.5–11.1)
PO2 ARTERIAL: 77 MMHG (ref 75–100)
POTASSIUM SERPL-SCNC: 3.6 MMOL/L (ref 3.5–5.1)
RBC # BLD: 3.06 M/CU MM (ref 4.6–6.2)
SEGMENTED NEUTROPHILS ABSOLUTE COUNT: 6.6 K/CU MM
SEGMENTED NEUTROPHILS RELATIVE PERCENT: 84 % (ref 36–66)
SODIUM BLD-SCNC: 139 MMOL/L (ref 135–145)
STATUS: NORMAL
TOTAL IMMATURE NEUTOROPHIL: 0.07 K/CU MM
TOTAL NUCLEATED RBC: 0 K/CU MM
TRANSFUSION STATUS: NORMAL
UNIT DIVISION: 0
UNIT NUMBER: NORMAL
WBC # BLD: 7.8 K/CU MM (ref 4–10.5)

## 2020-01-19 PROCEDURE — 74176 CT ABD & PELVIS W/O CONTRAST: CPT

## 2020-01-19 PROCEDURE — 6370000000 HC RX 637 (ALT 250 FOR IP): Performed by: STUDENT IN AN ORGANIZED HEALTH CARE EDUCATION/TRAINING PROGRAM

## 2020-01-19 PROCEDURE — 70450 CT HEAD/BRAIN W/O DYE: CPT

## 2020-01-19 PROCEDURE — 6360000002 HC RX W HCPCS: Performed by: INTERNAL MEDICINE

## 2020-01-19 PROCEDURE — 82803 BLOOD GASES ANY COMBINATION: CPT

## 2020-01-19 PROCEDURE — 84100 ASSAY OF PHOSPHORUS: CPT

## 2020-01-19 PROCEDURE — 2500000003 HC RX 250 WO HCPCS: Performed by: INTERNAL MEDICINE

## 2020-01-19 PROCEDURE — 80069 RENAL FUNCTION PANEL: CPT

## 2020-01-19 PROCEDURE — 51798 US URINE CAPACITY MEASURE: CPT

## 2020-01-19 PROCEDURE — 2580000003 HC RX 258: Performed by: INTERNAL MEDICINE

## 2020-01-19 PROCEDURE — 2000000000 HC ICU R&B

## 2020-01-19 PROCEDURE — 6370000000 HC RX 637 (ALT 250 FOR IP): Performed by: NURSE PRACTITIONER

## 2020-01-19 PROCEDURE — 2580000003 HC RX 258: Performed by: STUDENT IN AN ORGANIZED HEALTH CARE EDUCATION/TRAINING PROGRAM

## 2020-01-19 PROCEDURE — 6360000004 HC RX CONTRAST MEDICATION: Performed by: INTERNAL MEDICINE

## 2020-01-19 PROCEDURE — 85025 COMPLETE CBC W/AUTO DIFF WBC: CPT

## 2020-01-19 PROCEDURE — 6370000000 HC RX 637 (ALT 250 FOR IP): Performed by: INTERNAL MEDICINE

## 2020-01-19 PROCEDURE — 82330 ASSAY OF CALCIUM: CPT

## 2020-01-19 PROCEDURE — 3E1M39Z IRRIGATION OF PERITONEAL CAVITY USING DIALYSATE, PERCUTANEOUS APPROACH: ICD-10-PCS | Performed by: RADIOLOGY

## 2020-01-19 PROCEDURE — 36600 WITHDRAWAL OF ARTERIAL BLOOD: CPT

## 2020-01-19 PROCEDURE — 85730 THROMBOPLASTIN TIME PARTIAL: CPT

## 2020-01-19 PROCEDURE — 90945 DIALYSIS ONE EVALUATION: CPT

## 2020-01-19 PROCEDURE — 83735 ASSAY OF MAGNESIUM: CPT

## 2020-01-19 PROCEDURE — 2700000000 HC OXYGEN THERAPY PER DAY

## 2020-01-19 PROCEDURE — 71250 CT THORAX DX C-: CPT

## 2020-01-19 PROCEDURE — 80053 COMPREHEN METABOLIC PANEL: CPT

## 2020-01-19 PROCEDURE — 94761 N-INVAS EAR/PLS OXIMETRY MLT: CPT

## 2020-01-19 PROCEDURE — 71260 CT THORAX DX C+: CPT

## 2020-01-19 RX ORDER — HEPARIN SODIUM 10000 [USP'U]/100ML
18 INJECTION, SOLUTION INTRAVENOUS CONTINUOUS
Status: DISCONTINUED | OUTPATIENT
Start: 2020-01-19 | End: 2020-01-20

## 2020-01-19 RX ORDER — MAGNESIUM SULFATE 1 G/100ML
1 INJECTION INTRAVENOUS PRN
Status: DISCONTINUED | OUTPATIENT
Start: 2020-01-19 | End: 2020-01-21

## 2020-01-19 RX ORDER — HEPARIN SODIUM 1000 [USP'U]/ML
80 INJECTION, SOLUTION INTRAVENOUS; SUBCUTANEOUS PRN
Status: DISCONTINUED | OUTPATIENT
Start: 2020-01-20 | End: 2020-01-28

## 2020-01-19 RX ORDER — DIPHENHYDRAMINE HCL 25 MG
50 TABLET ORAL NIGHTLY PRN
Status: DISCONTINUED | OUTPATIENT
Start: 2020-01-19 | End: 2020-02-06 | Stop reason: HOSPADM

## 2020-01-19 RX ORDER — HEPARIN SODIUM 1000 [USP'U]/ML
80 INJECTION, SOLUTION INTRAVENOUS; SUBCUTANEOUS ONCE
Status: COMPLETED | OUTPATIENT
Start: 2020-01-19 | End: 2020-01-19

## 2020-01-19 RX ORDER — HEPARIN SODIUM 1000 [USP'U]/ML
40 INJECTION, SOLUTION INTRAVENOUS; SUBCUTANEOUS PRN
Status: DISCONTINUED | OUTPATIENT
Start: 2020-01-20 | End: 2020-01-28

## 2020-01-19 RX ORDER — 0.9 % SODIUM CHLORIDE 0.9 %
2000 INTRAVENOUS SOLUTION INTRAVENOUS PRN
Status: DISCONTINUED | OUTPATIENT
Start: 2020-01-19 | End: 2020-01-20

## 2020-01-19 RX ORDER — LORAZEPAM 2 MG/ML
0.5 INJECTION INTRAMUSCULAR ONCE
Status: COMPLETED | OUTPATIENT
Start: 2020-01-19 | End: 2020-01-19

## 2020-01-19 RX ORDER — HYDROXYZINE PAMOATE 25 MG/1
25 CAPSULE ORAL 3 TIMES DAILY PRN
Status: DISCONTINUED | OUTPATIENT
Start: 2020-01-19 | End: 2020-02-06 | Stop reason: HOSPADM

## 2020-01-19 RX ORDER — SODIUM CHLORIDE 9 MG/ML
INJECTION, SOLUTION INTRAVENOUS CONTINUOUS
Status: DISCONTINUED | OUTPATIENT
Start: 2020-01-19 | End: 2020-01-19

## 2020-01-19 RX ORDER — SODIUM CHLORIDE 0.9 % (FLUSH) 0.9 %
10 SYRINGE (ML) INJECTION PRN
Status: DISCONTINUED | OUTPATIENT
Start: 2020-01-19 | End: 2020-02-06 | Stop reason: HOSPADM

## 2020-01-19 RX ORDER — POTASSIUM CHLORIDE 29.8 MG/ML
20 INJECTION INTRAVENOUS PRN
Status: DISCONTINUED | OUTPATIENT
Start: 2020-01-19 | End: 2020-01-21

## 2020-01-19 RX ORDER — 0.9 % SODIUM CHLORIDE 0.9 %
1000 INTRAVENOUS SOLUTION INTRAVENOUS PRN
Status: COMPLETED | OUTPATIENT
Start: 2020-01-19 | End: 2020-01-20

## 2020-01-19 RX ORDER — FUROSEMIDE 10 MG/ML
INJECTION INTRAMUSCULAR; INTRAVENOUS
Status: DISCONTINUED
Start: 2020-01-19 | End: 2020-01-19 | Stop reason: WASHOUT

## 2020-01-19 RX ADMIN — SULFAMETHOXAZOLE AND TRIMETHOPRIM 284.8 MG: 80; 16 INJECTION, SOLUTION, CONCENTRATE INTRAVENOUS at 22:10

## 2020-01-19 RX ADMIN — CALCIUM ACETATE 1334 MG: 667 CAPSULE ORAL at 08:59

## 2020-01-19 RX ADMIN — METOPROLOL TARTRATE 50 MG: 50 TABLET, FILM COATED ORAL at 08:59

## 2020-01-19 RX ADMIN — CEFEPIME HYDROCHLORIDE 1 G: 1 INJECTION, POWDER, FOR SOLUTION INTRAMUSCULAR; INTRAVENOUS at 21:29

## 2020-01-19 RX ADMIN — SEVELAMER CARBONATE 800 MG: 800 TABLET, FILM COATED ORAL at 08:59

## 2020-01-19 RX ADMIN — HYDROXYZINE PAMOATE 25 MG: 25 CAPSULE ORAL at 18:48

## 2020-01-19 RX ADMIN — Medication 200 ML/HR: at 22:50

## 2020-01-19 RX ADMIN — ACETAMINOPHEN 650 MG: 325 TABLET ORAL at 18:47

## 2020-01-19 RX ADMIN — Medication 800 ML/HR: at 22:50

## 2020-01-19 RX ADMIN — HEPARIN SODIUM AND DEXTROSE 18 UNITS/KG/HR: 10000; 5 INJECTION INTRAVENOUS at 23:47

## 2020-01-19 RX ADMIN — LORAZEPAM 0.5 MG: 2 INJECTION INTRAMUSCULAR; INTRAVENOUS at 21:22

## 2020-01-19 RX ADMIN — HEPARIN SODIUM 5550 UNITS: 1000 INJECTION, SOLUTION INTRAVENOUS; SUBCUTANEOUS at 23:47

## 2020-01-19 RX ADMIN — SODIUM CHLORIDE, PRESERVATIVE FREE 10 ML: 5 INJECTION INTRAVENOUS at 09:01

## 2020-01-19 RX ADMIN — IOPAMIDOL 75 ML: 755 INJECTION, SOLUTION INTRAVENOUS at 20:25

## 2020-01-19 RX ADMIN — SODIUM CHLORIDE, PRESERVATIVE FREE 10 ML: 5 INJECTION INTRAVENOUS at 21:22

## 2020-01-19 RX ADMIN — CALCITRIOL CAPSULES 0.25 MCG 0.5 MCG: 0.25 CAPSULE ORAL at 09:00

## 2020-01-19 RX ADMIN — ACETAMINOPHEN 650 MG: 325 TABLET ORAL at 11:36

## 2020-01-19 RX ADMIN — SODIUM BICARBONATE 650 MG TABLET 650 MG: at 09:00

## 2020-01-19 ASSESSMENT — PAIN SCALES - GENERAL
PAINLEVEL_OUTOF10: 0

## 2020-01-19 NOTE — PROGRESS NOTES
nephromegaly- taken together HI VAN is the likely dx  And less likely HIVICK ( HIV related immune complex kidney dz) but not impossible  2. All if his sx can be explained by HIV/ AIDS-      Recommendation/Plan  :     1. So I have d/w Dr Iman Mc  2. Add viral load/ genotype/ CD 4 and T spot and CT of chest  3. Prn HD to support him  And other  supportive therapy  4. bx  result should come back tomorrow but I think I already  Have some  the dx - so will wait - alonso to se how much viable glomeruli he ahs and there extent of IFTA ) interstitial fibrosis and tubular atrophy   5. Dr Iman Mc will see him tomorrow and decide  about prophylaxis or other [address] [place] [id number] 153 etc   6.  supportive acre for now       Junaid Coburn MD

## 2020-01-20 ENCOUNTER — APPOINTMENT (OUTPATIENT)
Dept: CT IMAGING | Age: 45
DRG: 890 | End: 2020-01-20
Payer: MEDICAID

## 2020-01-20 LAB
ALANINE AMINOTRANSFERASE, FIBROMETER: 13 U/L (ref 5–50)
ALBUMIN SERPL-MCNC: 2 GM/DL (ref 3.4–5)
ALBUMIN SERPL-MCNC: 2.1 GM/DL (ref 3.4–5)
ALBUMIN SERPL-MCNC: 2.2 GM/DL (ref 3.4–5)
ALBUMIN SERPL-MCNC: 2.3 GM/DL (ref 3.4–5)
ALP BLD-CCNC: 46 IU/L (ref 40–129)
ALP BLD-CCNC: 48 IU/L (ref 40–129)
ALP BLD-CCNC: 50 IU/L (ref 40–129)
ALPHA-2-MACROGLOBULIN, FIBROMETER: 235 MG/DL (ref 131–293)
ALT SERPL-CCNC: 9 U/L (ref 10–40)
ANION GAP SERPL CALCULATED.3IONS-SCNC: 13 MMOL/L (ref 4–16)
ANION GAP SERPL CALCULATED.3IONS-SCNC: 14 MMOL/L (ref 4–16)
APTT: 165.8 SECONDS (ref 25.1–37.1)
APTT: 184.2 SECONDS (ref 25.1–37.1)
APTT: 43.6 SECONDS (ref 25.1–37.1)
APTT: 54.2 SECONDS (ref 25.1–37.1)
ASPARTATE AMINOTRANSFERASE, FIBROMETER: 22 U/L (ref 9–50)
AST SERPL-CCNC: 18 IU/L (ref 15–37)
AST SERPL-CCNC: 18 IU/L (ref 15–37)
AST SERPL-CCNC: 20 IU/L (ref 15–37)
BASOPHILS ABSOLUTE: 0 K/CU MM
BASOPHILS RELATIVE PERCENT: 0.2 % (ref 0–1)
BILIRUB SERPL-MCNC: 0.1 MG/DL (ref 0–1)
BILIRUB SERPL-MCNC: 0.1 MG/DL (ref 0–1)
BILIRUB SERPL-MCNC: 0.2 MG/DL (ref 0–1)
BUN BLDV-MCNC: 29 MG/DL (ref 6–23)
BUN BLDV-MCNC: 38 MG/DL (ref 6–23)
BUN BLDV-MCNC: 40 MG/DL (ref 6–23)
BUN BLDV-MCNC: 50 MG/DL (ref 6–23)
CALCIUM IONIZED: 3.68 MG/DL (ref 4.48–5.28)
CALCIUM IONIZED: 3.84 MG/DL (ref 4.48–5.28)
CALCIUM IONIZED: 4.04 MG/DL (ref 4.48–5.28)
CALCIUM SERPL-MCNC: 7.1 MG/DL (ref 8.3–10.6)
CALCIUM SERPL-MCNC: 7.2 MG/DL (ref 8.3–10.6)
CALCIUM SERPL-MCNC: 7.3 MG/DL (ref 8.3–10.6)
CALCIUM SERPL-MCNC: ABNORMAL MG/DL (ref 8.3–10.6)
CHLORIDE BLD-SCNC: 96 MMOL/L (ref 99–110)
CHLORIDE BLD-SCNC: 96 MMOL/L (ref 99–110)
CHLORIDE BLD-SCNC: 97 MMOL/L (ref 99–110)
CHLORIDE BLD-SCNC: 98 MMOL/L (ref 99–110)
CIRRHOMETER PATIENT SCORE: 0.02
CO2: 25 MMOL/L (ref 21–32)
CO2: 25 MMOL/L (ref 21–32)
CO2: 26 MMOL/L (ref 21–32)
CO2: 27 MMOL/L (ref 21–32)
CREAT SERPL-MCNC: 4.2 MG/DL (ref 0.9–1.3)
CREAT SERPL-MCNC: 5.4 MG/DL (ref 0.9–1.3)
CREAT SERPL-MCNC: 5.8 MG/DL (ref 0.9–1.3)
CREAT SERPL-MCNC: 7.4 MG/DL (ref 0.9–1.3)
DIFFERENTIAL TYPE: ABNORMAL
EER FIBROMETER REPORT: ABNORMAL
EER FIBROMETER REPORT: ABNORMAL
EOSINOPHILS ABSOLUTE: 0 K/CU MM
EOSINOPHILS RELATIVE PERCENT: 0.2 % (ref 0–3)
ERYTHROCYTE SEDIMENTATION RATE: 113 MM/HR (ref 0–15)
FIBROMETER INTERPRETATION: ABNORMAL
FIBROMETER INTERPRETATION: ABNORMAL
FIBROMETER PATIENT SCORE: 0.35
FIBROMETER PLATELET COUNT: 286
FIBROMETER PROTHROMBIN INDEX: 84 % (ref 90–120)
FIBROSIS METAVIR CLASSIFICATION: ABNORMAL
FIBROSIS METAVIR CLASSIFICATION: ABNORMAL
GAMMA GLUTAMYL TRANSFERASE, FIBROMETER: 42 U/L (ref 7–51)
GFR AFRICAN AMERICAN: 10 ML/MIN/1.73M2
GFR AFRICAN AMERICAN: 13 ML/MIN/1.73M2
GFR AFRICAN AMERICAN: 14 ML/MIN/1.73M2
GFR AFRICAN AMERICAN: 19 ML/MIN/1.73M2
GFR NON-AFRICAN AMERICAN: 11 ML/MIN/1.73M2
GFR NON-AFRICAN AMERICAN: 12 ML/MIN/1.73M2
GFR NON-AFRICAN AMERICAN: 16 ML/MIN/1.73M2
GFR NON-AFRICAN AMERICAN: 8 ML/MIN/1.73M2
GLUCOSE BLD-MCNC: 146 MG/DL (ref 70–99)
GLUCOSE BLD-MCNC: 148 MG/DL (ref 70–99)
GLUCOSE BLD-MCNC: 169 MG/DL (ref 70–99)
GLUCOSE BLD-MCNC: 173 MG/DL (ref 70–99)
HCT VFR BLD CALC: 29.2 % (ref 42–52)
HEMOGLOBIN: 8.6 GM/DL (ref 13.5–18)
HIGH SENSITIVE C-REACTIVE PROTEIN: 112.8 MG/L
IMMATURE NEUTROPHIL %: 1.1 % (ref 0–0.43)
INFLAMETER METAVIR CLASSIFICATION: ABNORMAL
INFLAMETER METAVIR CLASSIFICATION: ABNORMAL
INFLAMETER PATIENT SCORE: 0.28
IONIZED CA: 0.92 MMOL/L (ref 1.12–1.32)
IONIZED CA: 0.96 MMOL/L (ref 1.12–1.32)
IONIZED CA: 1.01 MMOL/L (ref 1.12–1.32)
LYMPHOCYTES ABSOLUTE: 0.4 K/CU MM
LYMPHOCYTES RELATIVE PERCENT: 7.7 % (ref 24–44)
MAGNESIUM: 2.1 MG/DL (ref 1.8–2.4)
MAGNESIUM: 2.2 MG/DL (ref 1.8–2.4)
MAGNESIUM: 2.3 MG/DL (ref 1.8–2.4)
MCH RBC QN AUTO: 23.7 PG (ref 27–31)
MCHC RBC AUTO-ENTMCNC: 29.5 % (ref 32–36)
MCV RBC AUTO: 80.4 FL (ref 78–100)
MONOCYTES ABSOLUTE: 0.1 K/CU MM
MONOCYTES RELATIVE PERCENT: 1.8 % (ref 0–4)
NUCLEATED RBC %: 0 %
PDW BLD-RTO: 20.1 % (ref 11.7–14.9)
PHOSPHORUS: 3.4 MG/DL (ref 2.5–4.9)
PHOSPHORUS: 4.9 MG/DL (ref 2.5–4.9)
PHOSPHORUS: 5.9 MG/DL (ref 2.5–4.9)
PHOSPHORUS: 6.3 MG/DL (ref 2.5–4.9)
PLATELET # BLD: 242 K/CU MM (ref 140–440)
PMV BLD AUTO: 10.9 FL (ref 7.5–11.1)
POTASSIUM SERPL-SCNC: 3.8 MMOL/L (ref 3.5–5.1)
POTASSIUM SERPL-SCNC: 3.8 MMOL/L (ref 3.5–5.1)
POTASSIUM SERPL-SCNC: 4.4 MMOL/L (ref 3.5–5.1)
POTASSIUM SERPL-SCNC: 4.4 MMOL/L (ref 3.5–5.1)
PROCALCITONIN: 6.91
RBC # BLD: 3.63 M/CU MM (ref 4.6–6.2)
SEGMENTED NEUTROPHILS ABSOLUTE COUNT: 5 K/CU MM
SEGMENTED NEUTROPHILS RELATIVE PERCENT: 89 % (ref 36–66)
SODIUM BLD-SCNC: 134 MMOL/L (ref 135–145)
SODIUM BLD-SCNC: 135 MMOL/L (ref 135–145)
SODIUM BLD-SCNC: 137 MMOL/L (ref 135–145)
SODIUM BLD-SCNC: 137 MMOL/L (ref 135–145)
TOTAL IMMATURE NEUTOROPHIL: 0.06 K/CU MM
TOTAL NUCLEATED RBC: 0 K/CU MM
TOTAL PROTEIN: 4.2 GM/DL (ref 6.4–8.2)
TOTAL PROTEIN: 4.8 GM/DL (ref 6.4–8.2)
TOTAL PROTEIN: 5.2 GM/DL (ref 6.4–8.2)
UREA NITROGEN, FIBROMETER: 72 MG/DL (ref 7–20)
WBC # BLD: 5.6 K/CU MM (ref 4–10.5)

## 2020-01-20 PROCEDURE — 36592 COLLECT BLOOD FROM PICC: CPT

## 2020-01-20 PROCEDURE — 2000000000 HC ICU R&B

## 2020-01-20 PROCEDURE — 87536 HIV-1 QUANT&REVRSE TRNSCRPJ: CPT

## 2020-01-20 PROCEDURE — 88184 FLOWCYTOMETRY/ TC 1 MARKER: CPT

## 2020-01-20 PROCEDURE — 80069 RENAL FUNCTION PANEL: CPT

## 2020-01-20 PROCEDURE — 87901 NFCT AGT GNTYP ALYS HIV1 REV: CPT

## 2020-01-20 PROCEDURE — 2580000003 HC RX 258: Performed by: INTERNAL MEDICINE

## 2020-01-20 PROCEDURE — 88185 FLOWCYTOMETRY/TC ADD-ON: CPT

## 2020-01-20 PROCEDURE — 6360000002 HC RX W HCPCS: Performed by: INTERNAL MEDICINE

## 2020-01-20 PROCEDURE — 87081 CULTURE SCREEN ONLY: CPT

## 2020-01-20 PROCEDURE — 87327 CRYPTOCOCCUS NEOFORM AG IA: CPT

## 2020-01-20 PROCEDURE — 83735 ASSAY OF MAGNESIUM: CPT

## 2020-01-20 PROCEDURE — 82330 ASSAY OF CALCIUM: CPT

## 2020-01-20 PROCEDURE — 2500000003 HC RX 250 WO HCPCS: Performed by: INTERNAL MEDICINE

## 2020-01-20 PROCEDURE — 90945 DIALYSIS ONE EVALUATION: CPT

## 2020-01-20 PROCEDURE — 94761 N-INVAS EAR/PLS OXIMETRY MLT: CPT

## 2020-01-20 PROCEDURE — 87449 NOS EACH ORGANISM AG IA: CPT

## 2020-01-20 PROCEDURE — 84100 ASSAY OF PHOSPHORUS: CPT

## 2020-01-20 PROCEDURE — 86481 TB AG RESPONSE T-CELL SUSP: CPT

## 2020-01-20 PROCEDURE — 85652 RBC SED RATE AUTOMATED: CPT

## 2020-01-20 PROCEDURE — 2720000010 HC SURG SUPPLY STERILE

## 2020-01-20 PROCEDURE — 87103 BLOOD FUNGUS CULTURE: CPT

## 2020-01-20 PROCEDURE — 84145 PROCALCITONIN (PCT): CPT

## 2020-01-20 PROCEDURE — 6370000000 HC RX 637 (ALT 250 FOR IP): Performed by: NURSE PRACTITIONER

## 2020-01-20 PROCEDURE — 6370000000 HC RX 637 (ALT 250 FOR IP): Performed by: STUDENT IN AN ORGANIZED HEALTH CARE EDUCATION/TRAINING PROGRAM

## 2020-01-20 PROCEDURE — 80053 COMPREHEN METABOLIC PANEL: CPT

## 2020-01-20 PROCEDURE — 99255 IP/OBS CONSLTJ NEW/EST HI 80: CPT | Performed by: INTERNAL MEDICINE

## 2020-01-20 PROCEDURE — 74176 CT ABD & PELVIS W/O CONTRAST: CPT

## 2020-01-20 PROCEDURE — 6370000000 HC RX 637 (ALT 250 FOR IP): Performed by: INTERNAL MEDICINE

## 2020-01-20 PROCEDURE — 2580000003 HC RX 258

## 2020-01-20 PROCEDURE — 87385 HISTOPLASMA CAPSUL AG IA: CPT

## 2020-01-20 PROCEDURE — 86141 C-REACTIVE PROTEIN HS: CPT

## 2020-01-20 PROCEDURE — 85730 THROMBOPLASTIN TIME PARTIAL: CPT

## 2020-01-20 PROCEDURE — 86592 SYPHILIS TEST NON-TREP QUAL: CPT

## 2020-01-20 PROCEDURE — 85025 COMPLETE CBC W/AUTO DIFF WBC: CPT

## 2020-01-20 RX ORDER — PREDNISONE 20 MG/1
40 TABLET ORAL DAILY
Status: DISCONTINUED | OUTPATIENT
Start: 2020-01-25 | End: 2020-01-27

## 2020-01-20 RX ORDER — PREDNISONE 20 MG/1
40 TABLET ORAL 2 TIMES DAILY
Status: DISCONTINUED | OUTPATIENT
Start: 2020-01-20 | End: 2020-01-25

## 2020-01-20 RX ORDER — MIDODRINE HYDROCHLORIDE 5 MG/1
5 TABLET ORAL 2 TIMES DAILY WITH MEALS
Status: DISCONTINUED | OUTPATIENT
Start: 2020-01-20 | End: 2020-01-23

## 2020-01-20 RX ORDER — PREDNISONE 20 MG/1
40 TABLET ORAL 2 TIMES DAILY
Status: DISCONTINUED | OUTPATIENT
Start: 2020-01-20 | End: 2020-01-20

## 2020-01-20 RX ORDER — SODIUM CHLORIDE 9 MG/ML
INJECTION, SOLUTION INTRAVENOUS
Status: COMPLETED
Start: 2020-01-20 | End: 2020-01-20

## 2020-01-20 RX ORDER — DOXYCYCLINE HYCLATE 100 MG
100 TABLET ORAL EVERY 12 HOURS SCHEDULED
Status: DISCONTINUED | OUTPATIENT
Start: 2020-01-20 | End: 2020-01-29

## 2020-01-20 RX ORDER — HEPARIN SODIUM 10000 [USP'U]/100ML
18 INJECTION, SOLUTION INTRAVENOUS CONTINUOUS
Status: DISCONTINUED | OUTPATIENT
Start: 2020-01-20 | End: 2020-01-28

## 2020-01-20 RX ORDER — SODIUM CHLORIDE 9 MG/ML
INJECTION, SOLUTION INTRAVENOUS
Status: DISPENSED
Start: 2020-01-20 | End: 2020-01-21

## 2020-01-20 RX ORDER — PREDNISONE 20 MG/1
20 TABLET ORAL DAILY
Status: DISCONTINUED | OUTPATIENT
Start: 2020-01-30 | End: 2020-01-27

## 2020-01-20 RX ADMIN — CEFEPIME HYDROCHLORIDE 2 G: 2 INJECTION, POWDER, FOR SOLUTION INTRAVENOUS at 16:49

## 2020-01-20 RX ADMIN — SODIUM BICARBONATE 650 MG TABLET 650 MG: at 20:43

## 2020-01-20 RX ADMIN — MIDODRINE HYDROCHLORIDE 5 MG: 5 TABLET ORAL at 10:23

## 2020-01-20 RX ADMIN — DOXYCYCLINE HYCLATE 100 MG: 100 TABLET, COATED ORAL at 20:43

## 2020-01-20 RX ADMIN — CALCITRIOL CAPSULES 0.25 MCG 0.5 MCG: 0.25 CAPSULE ORAL at 08:15

## 2020-01-20 RX ADMIN — SEVELAMER CARBONATE 800 MG: 800 TABLET, FILM COATED ORAL at 08:15

## 2020-01-20 RX ADMIN — CALCIUM ACETATE 1334 MG: 667 CAPSULE ORAL at 18:07

## 2020-01-20 RX ADMIN — SEVELAMER CARBONATE 800 MG: 800 TABLET, FILM COATED ORAL at 18:08

## 2020-01-20 RX ADMIN — VANCOMYCIN HYDROCHLORIDE 1250 MG: 5 INJECTION, POWDER, LYOPHILIZED, FOR SOLUTION INTRAVENOUS at 04:08

## 2020-01-20 RX ADMIN — METOPROLOL TARTRATE 50 MG: 50 TABLET, FILM COATED ORAL at 08:15

## 2020-01-20 RX ADMIN — Medication 800 ML/HR: at 14:00

## 2020-01-20 RX ADMIN — HEPARIN SODIUM AND DEXTROSE 16 UNITS/KG/HR: 10000; 5 INJECTION INTRAVENOUS at 22:37

## 2020-01-20 RX ADMIN — CALCIUM GLUCONATE 1 G: 98 INJECTION, SOLUTION INTRAVENOUS at 19:45

## 2020-01-20 RX ADMIN — CALCIUM GLUCONATE 1 G: 98 INJECTION, SOLUTION INTRAVENOUS at 09:26

## 2020-01-20 RX ADMIN — PREDNISONE 40 MG: 20 TABLET ORAL at 13:40

## 2020-01-20 RX ADMIN — ATORVASTATIN CALCIUM 40 MG: 40 TABLET, FILM COATED ORAL at 20:43

## 2020-01-20 RX ADMIN — CALCIUM GLUCONATE 1 G: 98 INJECTION, SOLUTION INTRAVENOUS at 03:12

## 2020-01-20 RX ADMIN — SULFAMETHOXAZOLE AND TRIMETHOPRIM 284.8 MG: 80; 16 INJECTION, SOLUTION, CONCENTRATE INTRAVENOUS at 06:09

## 2020-01-20 RX ADMIN — HYDROXYZINE PAMOATE 25 MG: 25 CAPSULE ORAL at 15:56

## 2020-01-20 RX ADMIN — HEPARIN SODIUM 2780 UNITS: 1000 INJECTION, SOLUTION INTRAVENOUS; SUBCUTANEOUS at 17:42

## 2020-01-20 RX ADMIN — Medication 800 ML/HR: at 06:52

## 2020-01-20 RX ADMIN — CEFEPIME HYDROCHLORIDE 2 G: 2 INJECTION, POWDER, FOR SOLUTION INTRAVENOUS at 13:39

## 2020-01-20 RX ADMIN — CALCIUM ACETATE 1334 MG: 667 CAPSULE ORAL at 08:15

## 2020-01-20 RX ADMIN — Medication 800 ML/HR: at 06:20

## 2020-01-20 RX ADMIN — MIDODRINE HYDROCHLORIDE 5 MG: 5 TABLET ORAL at 18:08

## 2020-01-20 RX ADMIN — ACETAMINOPHEN 650 MG: 325 TABLET ORAL at 20:43

## 2020-01-20 RX ADMIN — METOPROLOL TARTRATE 50 MG: 50 TABLET, FILM COATED ORAL at 20:43

## 2020-01-20 RX ADMIN — SODIUM BICARBONATE 650 MG TABLET 650 MG: at 08:15

## 2020-01-20 RX ADMIN — SODIUM CHLORIDE 1000 ML: 9 INJECTION, SOLUTION INTRAVENOUS at 17:54

## 2020-01-20 RX ADMIN — SULFAMETHOXAZOLE AND TRIMETHOPRIM 350.4 MG: 80; 16 INJECTION, SOLUTION, CONCENTRATE INTRAVENOUS at 19:14

## 2020-01-20 RX ADMIN — SODIUM CHLORIDE 1000 ML: 9 INJECTION, SOLUTION INTRAVENOUS at 00:15

## 2020-01-20 RX ADMIN — SODIUM BICARBONATE 650 MG TABLET 650 MG: at 13:30

## 2020-01-20 RX ADMIN — PREDNISONE 40 MG: 20 TABLET ORAL at 20:43

## 2020-01-20 ASSESSMENT — PAIN SCALES - GENERAL: PAINLEVEL_OUTOF10: 0

## 2020-01-20 NOTE — SIGNIFICANT EVENT
SIGNIFICANT EVENT:  RAPID RESPONSE    RAPID RESPONSE was called for Tru Robertson. for Fever, respiratory distress. Patient seen and examined in 2017/2017-A. Patient was transferred from ICU to step down today. Rapid response was called on this patient just before shift change. Patient was noted to have fever 102.6, became tachycardic, tachypneic, was on NRB saturating 100%. Patient did not have any urine output as per RN. Bladder scan 100cc. ABG was ordered. CT chest with contrast was ordered. Patient was also started on vancomycin, cefepime, bactrim for possible PCP. IV fluids were ordered. On my examination patient had crackles, ronchi. ABG- pH-7.44, pCO2- 39, pO2-77, HCO3-26.5. I discussed the patient with Dr Elliott Later. Recommended d/c IV, transfer to ICU and starting CRRT. VITALS  Vitals:    01/19/20 1600 01/19/20 1745 01/19/20 1845 01/19/20 1926   BP: 104/79 (!) 130/90     Pulse: 73      Resp: 24   (!) 32   Temp: 98.6 °F (37 °C) 98.7 °F (37.1 °C) 102.6 °F (39.2 °C)    TempSrc: Oral Oral Axillary    SpO2: 100%   100%   Weight:       Height:             ASSESSMENT/PLAN:     Transfer to ICU, to be started on CRRT as per Dr Elliott Later.       Lara Keenan MD  Internal Medicine Hospitalist  [facility]  1/19/2020 7:43 PM

## 2020-01-20 NOTE — PROGRESS NOTES
Nephrology Progress Note  1/20/2020 9:41 AM  Subjective:   Admit Date: 1/15/2020  PCP: No primary care provider on file. Interval History: pt seen and examined on crrt and tolerating with goal 100cc/hr uf and breathing better. Sp biopsy and hb low but stable now and also with PE on heparin. Await bx and ID rec    Diet: Dietary Nutrition Supplements: Renal Oral Supplement  DIET RENAL;  Pain is: Moderate      Data:   Scheduled Meds:   vancomycin (VANCOCIN) intermittent dosing (placeholder)   Other See Admin Instructions    sulfamethoxazole-trimethoprim (BACTRIM) IVPB  5 mg/kg Intravenous Q12H    cefepime  2 g Intravenous Q8H    0.9 % sodium chloride  250 mL Intravenous Once    LORazepam  0.5 mg Intravenous Once    metoprolol tartrate  50 mg Oral BID    calcium acetate  1,334 mg Oral TID WC    sevelamer  800 mg Oral TID WC    sodium bicarbonate  650 mg Oral TID    calcitRIOL  0.5 mcg Oral Daily    atorvastatin  40 mg Oral Nightly    sodium chloride flush  10 mL Intravenous 2 times per day     Continuous Infusions:   prismaSATE BGK 4/2.5 800 mL/hr (01/20/20 0620)    prismaSATE BGK 4/2.5 800 mL/hr (01/20/20 0652)    prismaSATE BGK 4/2.5 200 mL/hr (01/19/20 2250)    heparin (porcine) 14 Units/kg/hr (01/20/20 0922)     PRN Meds:diphenhydrAMINE, hydrOXYzine, sodium chloride flush, potassium chloride, magnesium sulfate, calcium gluconate IVPB **OR** calcium gluconate IVPB **OR** calcium gluconate IVPB **OR** calcium gluconate IVPB, sodium phosphate IVPB **OR** sodium phosphate IVPB **OR** sodium phosphate IVPB **OR** sodium phosphate IVPB, heparin (porcine), heparin (porcine), sodium chloride flush, magnesium hydroxide, ondansetron, acetaminophen  I/O last 3 completed shifts:   In: 8599 [I.V.:80; IV Piggyback:1278]  Out: 2187 [Urine:125]  I/O this shift:  In: -   Out: 888     Intake/Output Summary (Last 24 hours) at 1/20/2020 0941  Last data filed at 1/20/2020 0900  Gross per 24 hour   Intake 1358 ml

## 2020-01-20 NOTE — FLOWSHEET NOTE
CRRT now running after issues with machine set-up and vas cath (vas cath only sutured on one side and very positional).

## 2020-01-20 NOTE — PROGRESS NOTES
RENAL DOSE ADJUSTMENT MADE PER P/T PROTOCOL    PREVIOUS ORDER:  Sulfamethoxazole-trimethoprim 4 mg/kg IVPB q8h    Estimated Creatinine Clearance: 11 mL/min (A) (based on SCr of 7.4 mg/dL (H)). Recent Labs     01/17/20  1111  01/18/20  0314 01/19/20  0630 01/19/20  2351 01/20/20  0615   BUN  --   --  45* 41* 50*  --    CREATININE  --    < > 6.7* 6.8* 7.4*  --    PLT  --    < > 219 261  --  242   INR 0.93  --   --   --   --   --     < > = values in this interval not displayed. NEW RENALLY ADJUSTED ORDER:  Sulfamethoxazole-trimethoprim 5 mg/kg IVPB q12h while ordered on CRRT. Pharmacy will continue to follow and adjust dosing as appropriate.     Laureen Arboleda, PharmD, [facility]  __________________________________________________

## 2020-01-20 NOTE — PROGRESS NOTES
Report given to Lillian Mendez, she is resuming care. New orders received to transfer patient to ICU after going to CT Scan. Electronically signed by Elenor Lombard, RN on 1/19/2020 at 7:56 PM

## 2020-01-20 NOTE — FLOWSHEET NOTE
Patient in room 2115, 100% on nonrebreather mask. Alert and oriented. Explained situation and plan of care to patient with some success. Dr Gore Screws paged, placing orders now.

## 2020-01-20 NOTE — PROGRESS NOTES
Received a call from [place] radiology regarding CT angio-positive for pulmonary embolus within the distal right pulmonary artery, bifurcation into the lower lobe and middle lobe branches. Also extensive airspace disease throughout the left lung, right lower lobe, moderate-sized left pleural effusion, small right pleural effusion. Patient is already started on vancomycin, cefepime. Ordered high-dose weight-based heparin drip for PE.       Nini Browne MD

## 2020-01-20 NOTE — CONSULTS
states that he has been in [place] for the last 3 years. Apparently works for the [employer] as an . Lived in [place] for a while before moving to [place]. He is opaque about the number of sexual partners has had, stating that he kept away from woman in [place]. He states that he got  to woman in June 2019 and finds it difficult to believe that he has HIV. He says he has a son from a previous relationship and he who is 10years old. He has had fever since admission. He was found to have acute kidney injury, nephromegaly and nephrotic range proteinuria. Kidney biopsy has been performed. His HIV screen was positive. Denies any skin rash, oral ulcers, dysphagia, odynophagia, abdominal pain, dysuria, urinary frequency, penile discharge. ? Infectious diseases service was consulted to evaluate the pt, and recommend further investigative and therapeutic measures. ROS: Other systems reviewed Including eyes, ENT, respiratory, cardiovascular, GI, , dermatologic, neurologic, psych, hem/lymphatic, musculoskeletal and endocrine were negative other than what is mentioned above. Patient Active Problem List    Diagnosis Date Noted    Acute renal failure ([facility] [address] 01/15/2020     Past Medical History:   Diagnosis Date    H. pylori infection     per patient    H/O urinary tract infection     in childhood, per patient      History reviewed. No pertinent surgical history. Family History   Problem Relation Age of Onset    Stroke Mother       Infectious disease related family history - not contibutory. SOCIAL HISTORY  Social History     Tobacco Use    Smoking status: Never Smoker    Smokeless tobacco: Never Used   Substance Use Topics    Alcohol use: Never     Frequency: Never       Born: [birth year] Occupation:   No recent travel of significance.  No recent unusual exposures.  NO pets    ? ALLERGIES  No Known Allergies   MEDICATIONS  Reviewed and are per the chart/EMR. ?   Antibiotics: Vancomycin  Cefepime  Bactrim  ?  -------------------------------------------------------------------------------------------------------------------    Vital Signs:  Vitals:    01/20/20 1000   BP: 92/71   Pulse: 67   Resp: 23   Temp:    SpO2: 100%         Exam:    VS: noted; wt 70.1 kg   Gen: alert and oriented X3, chronically ill looking  Skin: no stigmata of endocarditis  Wounds: C/D/I  HEMT: AT/NC Oropharynx pink, moist, and without lesions or exudates; dentition in good state of repair  Eyes: PERRL, right eye lateral strabismus, bilateral lens opacities  Neck: Supple. Trachea midline. No LAD. Chest: Reduced air entry bilaterally  Heart: RRR and no MRG. Abd: soft, non-distended, no tenderness, no hepatomegaly. Normoactive bowel sounds. Ext: no clubbing, cyanosis, or edema  Catheter Site: Right IJ Vas-Cath without erythema or tenderness  Neuro: Mental status intact. CN 2-12 intact and no focal sensory or motor deficits    ? Diagnostic Studies: reviewed  ? ? I have examined this patient and available medical records on this date and have made the above observations, conclusions and recommendations.   Electronically signed by: Electronically signed by Davis Stanley MD on 1/20/2020 at 11:01 AM

## 2020-01-20 NOTE — PROGRESS NOTES
RENAL DOSE ADJUSTMENT MADE PER P/T PROTOCOL    PREVIOUS ORDER:  Cefepime 1g IVPB q12h  Target dose: Cefepime 2g IVPB q8h  Indication: HCAP/Possible PCP    Estimated Creatinine Clearance: 11 mL/min (A) (based on SCr of 7.4 mg/dL (H)). Recent Labs     01/17/20  1111  01/18/20  0314 01/19/20  0630 01/19/20  2351 01/20/20  0615   BUN  --   --  45* 41* 50*  --    CREATININE  --    < > 6.7* 6.8* 7.4*  --    PLT  --    < > 219 261  --  242   INR 0.93  --   --   --   --   --     < > = values in this interval not displayed. NEW RENALLY ADJUSTED ORDER:  Cefepime 2g IVPB q8h while ordered on CRRT: CVVHD-F. Pharmacy will continue to follow and adjust dosing as appropriate.     Marta Saldivar, PharmD, [facility]  __________________________________________________

## 2020-01-20 NOTE — PROGRESS NOTES
I brought this patient from CT on the monitor and with all of his belongings and placed him in ICU room 2115. Bedside report was given to Lorrie ICU RN. Patients HR and RR remain tachy, otherwise vitals stable at this time.

## 2020-01-21 ENCOUNTER — ANESTHESIA EVENT (OUTPATIENT)
Dept: ICU | Age: 45
End: 2020-01-21

## 2020-01-21 LAB
ALBUMIN SERPL-MCNC: 2.1 GM/DL (ref 3.4–5)
ALBUMIN SERPL-MCNC: 2.4 GM/DL
ALBUMIN SERPL-MCNC: 2.4 GM/DL (ref 3.4–5)
ALP BLD-CCNC: 41 IU/L (ref 40–129)
ALP BLD-CCNC: 48 IU/L
ALP BLD-CCNC: 49 IU/L (ref 40–128)
ALT SERPL-CCNC: 10 U/L (ref 10–40)
ALT SERPL-CCNC: 7 U/L (ref 10–40)
ALT SERPL-CCNC: 9 U/L (ref 10–40)
ANION GAP SERPL CALCULATED.3IONS-SCNC: 10 MMOL/L (ref 4–16)
ANION GAP SERPL CALCULATED.3IONS-SCNC: 13 MMOL/L (ref 4–16)
ANION GAP SERPL CALCULATED.3IONS-SCNC: 13 MMOL/L (ref 4–16)
APTT: 53.4 SECONDS (ref 25.1–37.1)
APTT: 69.7 SECONDS (ref 25.1–37.1)
APTT: 75.5 SECONDS (ref 25.1–37.1)
AST SERPL-CCNC: 14 IU/L (ref 15–37)
AST SERPL-CCNC: 17 IU/L (ref 15–37)
AST SERPL-CCNC: 19 IU/L (ref 15–37)
BANDED NEUTROPHILS ABSOLUTE COUNT: 0.61 K/CU MM
BANDED NEUTROPHILS RELATIVE PERCENT: 13 % (ref 5–11)
BILIRUB SERPL-MCNC: 0.1 MG/DL (ref 0–1)
BILIRUB SERPL-MCNC: 0.1 MG/DL (ref 0–1)
BILIRUB SERPL-MCNC: 0.2 MG/DL (ref 0–1)
BUN BLDV-MCNC: 23 MG/DL (ref 6–23)
BUN BLDV-MCNC: 30 MG/DL (ref 6–23)
BUN BLDV-MCNC: 33 MG/DL (ref 6–23)
CALCIUM IONIZED: 4.04 MG/DL (ref 4.48–5.28)
CALCIUM IONIZED: 4.12 MG/DL (ref 4.48–5.28)
CALCIUM IONIZED: 4.2 MG/DL (ref 4.48–5.28)
CALCIUM SERPL-MCNC: 7.3 MG/DL (ref 8.3–10.6)
CALCIUM SERPL-MCNC: 7.3 MG/DL (ref 8.3–10.6)
CALCIUM SERPL-MCNC: 7.6 MG/DL (ref 8.3–10.6)
CD3 % TOTAL T CELLS: 76 % (ref 62–87)
CD3 ABSOLUTE: 273 CELLS/UL (ref 570–2400)
CD4 % HELPER T CELL: 6 % (ref 32–64)
CD4 T CELL ABSOLUTE: 20 CELLS/UL (ref 430–1800)
CD4/CD8 RATIO: 0.09 RATIO (ref 0.8–3.9)
CD8 % SUPPRESSOR T CELL: 69 % (ref 15–46)
CD8 T CELL ABSOLUTE: 247 CELLS/UL (ref 210–1200)
CHLORIDE BLD-SCNC: 95 MMOL/L (ref 99–110)
CHLORIDE BLD-SCNC: 98 MMOL/L (ref 99–110)
CHLORIDE BLD-SCNC: 98 MMOL/L (ref 99–110)
CO2: 24 MMOL/L (ref 21–32)
CO2: 25 MMOL/L (ref 21–32)
CO2: 26 MMOL/L (ref 21–32)
CREAT SERPL-MCNC: 3 MG/DL (ref 0.9–1.3)
CREAT SERPL-MCNC: 4 MG/DL (ref 0.9–1.3)
CREAT SERPL-MCNC: 4.5 MG/DL (ref 0.9–1.3)
CRYPTOCOCCAL ANTIGEN: NORMAL
CULTURE: NORMAL
DIFFERENTIAL TYPE: ABNORMAL
DOSE AMOUNT: NORMAL
DOSE TIME: NORMAL
GFR AFRICAN AMERICAN: 17 ML/MIN/1.73M2
GFR AFRICAN AMERICAN: 20 ML/MIN/1.73M2
GFR AFRICAN AMERICAN: 28 ML/MIN/1.73M2
GFR NON-AFRICAN AMERICAN: 14 ML/MIN/1.73M2
GFR NON-AFRICAN AMERICAN: 17 ML/MIN/1.73M2
GFR NON-AFRICAN AMERICAN: 23 ML/MIN/1.73M2
GLUCOSE BLD-MCNC: 112 MG/DL (ref 70–99)
GLUCOSE BLD-MCNC: 114 MG/DL (ref 70–99)
GLUCOSE BLD-MCNC: 133 MG/DL (ref 70–99)
HCT VFR BLD CALC: 30 % (ref 42–52)
HEMOGLOBIN: 9 GM/DL (ref 13.5–18)
IONIZED CA: 1.01 MMOL/L (ref 1.12–1.32)
IONIZED CA: 1.03 MMOL/L (ref 1.12–1.32)
IONIZED CA: 1.05 MMOL/L (ref 1.12–1.32)
LYMPHOCYTES ABSOLUTE: 0.4 K/CU MM
LYMPHOCYTES RELATIVE PERCENT: 9 % (ref 24–44)
Lab: NORMAL
MAGNESIUM: 2.4 MG/DL (ref 1.8–2.4)
MCH RBC QN AUTO: 23.7 PG (ref 27–31)
MCHC RBC AUTO-ENTMCNC: 30 % (ref 32–36)
MCV RBC AUTO: 79.2 FL (ref 78–100)
MONOCYTES ABSOLUTE: 0.1 K/CU MM
MONOCYTES RELATIVE PERCENT: 2 % (ref 0–4)
PDW BLD-RTO: 20.4 % (ref 11.7–14.9)
PHOSPHORUS: 3.5 MG/DL (ref 2.5–4.9)
PHOSPHORUS: 4.1 MG/DL (ref 2.5–4.9)
PHOSPHORUS: 4.5 MG/DL (ref 2.5–4.9)
PLATELET # BLD: 260 K/CU MM (ref 140–440)
PMV BLD AUTO: 11.4 FL (ref 7.5–11.1)
POTASSIUM SERPL-SCNC: 4.4 MMOL/L (ref 3.5–5.1)
POTASSIUM SERPL-SCNC: 4.6 MMOL/L (ref 3.5–5.1)
POTASSIUM SERPL-SCNC: 4.7 MMOL/L (ref 3.5–5.1)
RBC # BLD: 3.79 M/CU MM (ref 4.6–6.2)
RBC # BLD: ABNORMAL 10*6/UL
SEGMENTED NEUTROPHILS ABSOLUTE COUNT: 3.6 K/CU MM
SEGMENTED NEUTROPHILS RELATIVE PERCENT: 76 % (ref 36–66)
SODIUM BLD-SCNC: 133 MMOL/L
SODIUM BLD-SCNC: 134 MMOL/L (ref 135–145)
SODIUM BLD-SCNC: 135 MMOL/L (ref 135–145)
SPECIMEN: NORMAL
TOTAL PROTEIN: 4.6 GM/DL (ref 6.4–8.2)
TOTAL PROTEIN: 4.9 GM/DL (ref 6.4–8.2)
TOTAL PROTEIN: 5.2 GM/DL (ref 6.4–8.2)
VANCOMYCIN RANDOM: 7.9 UG/ML
VANCOMYCIN RANDOM: NORMAL UG/ML
WBC # BLD: 4.7 K/CU MM (ref 4–10.5)
WBC # BLD: ABNORMAL 10*3/UL

## 2020-01-21 PROCEDURE — 87899 AGENT NOS ASSAY W/OPTIC: CPT

## 2020-01-21 PROCEDURE — 6370000000 HC RX 637 (ALT 250 FOR IP): Performed by: INTERNAL MEDICINE

## 2020-01-21 PROCEDURE — 83735 ASSAY OF MAGNESIUM: CPT

## 2020-01-21 PROCEDURE — 87385 HISTOPLASMA CAPSUL AG IA: CPT

## 2020-01-21 PROCEDURE — 36592 COLLECT BLOOD FROM PICC: CPT

## 2020-01-21 PROCEDURE — 84075 ASSAY ALKALINE PHOSPHATASE: CPT

## 2020-01-21 PROCEDURE — 86635 COCCIDIOIDES ANTIBODY: CPT

## 2020-01-21 PROCEDURE — 6360000002 HC RX W HCPCS: Performed by: INTERNAL MEDICINE

## 2020-01-21 PROCEDURE — 2500000003 HC RX 250 WO HCPCS: Performed by: INTERNAL MEDICINE

## 2020-01-21 PROCEDURE — 87449 NOS EACH ORGANISM AG IA: CPT

## 2020-01-21 PROCEDURE — 84460 ALANINE AMINO (ALT) (SGPT): CPT

## 2020-01-21 PROCEDURE — C9113 INJ PANTOPRAZOLE SODIUM, VIA: HCPCS | Performed by: INTERNAL MEDICINE

## 2020-01-21 PROCEDURE — 82330 ASSAY OF CALCIUM: CPT

## 2020-01-21 PROCEDURE — 85027 COMPLETE CBC AUTOMATED: CPT

## 2020-01-21 PROCEDURE — 80202 ASSAY OF VANCOMYCIN: CPT

## 2020-01-21 PROCEDURE — 80053 COMPREHEN METABOLIC PANEL: CPT

## 2020-01-21 PROCEDURE — 2580000003 HC RX 258: Performed by: STUDENT IN AN ORGANIZED HEALTH CARE EDUCATION/TRAINING PROGRAM

## 2020-01-21 PROCEDURE — 99254 IP/OBS CNSLTJ NEW/EST MOD 60: CPT | Performed by: INTERNAL MEDICINE

## 2020-01-21 PROCEDURE — 86606 ASPERGILLUS ANTIBODY: CPT

## 2020-01-21 PROCEDURE — 87798 DETECT AGENT NOS DNA AMP: CPT

## 2020-01-21 PROCEDURE — 84155 ASSAY OF PROTEIN SERUM: CPT

## 2020-01-21 PROCEDURE — 80069 RENAL FUNCTION PANEL: CPT

## 2020-01-21 PROCEDURE — 85730 THROMBOPLASTIN TIME PARTIAL: CPT

## 2020-01-21 PROCEDURE — 86612 BLASTOMYCES ANTIBODY: CPT

## 2020-01-21 PROCEDURE — 85007 BL SMEAR W/DIFF WBC COUNT: CPT

## 2020-01-21 PROCEDURE — 87116 MYCOBACTERIA CULTURE: CPT

## 2020-01-21 PROCEDURE — 84100 ASSAY OF PHOSPHORUS: CPT

## 2020-01-21 PROCEDURE — 87591 N.GONORRHOEAE DNA AMP PROB: CPT

## 2020-01-21 PROCEDURE — 99233 SBSQ HOSP IP/OBS HIGH 50: CPT | Performed by: INTERNAL MEDICINE

## 2020-01-21 PROCEDURE — 84450 TRANSFERASE (AST) (SGOT): CPT

## 2020-01-21 PROCEDURE — 2580000003 HC RX 258: Performed by: INTERNAL MEDICINE

## 2020-01-21 PROCEDURE — 90945 DIALYSIS ONE EVALUATION: CPT

## 2020-01-21 PROCEDURE — 86698 HISTOPLASMA ANTIBODY: CPT

## 2020-01-21 PROCEDURE — 86778 TOXOPLASMA ANTIBODY IGM: CPT

## 2020-01-21 PROCEDURE — 2000000000 HC ICU R&B

## 2020-01-21 PROCEDURE — 82247 BILIRUBIN TOTAL: CPT

## 2020-01-21 PROCEDURE — 87491 CHLMYD TRACH DNA AMP PROBE: CPT

## 2020-01-21 PROCEDURE — 86317 IMMUNOASSAY INFECTIOUS AGENT: CPT

## 2020-01-21 RX ORDER — PANTOPRAZOLE SODIUM 40 MG/10ML
40 INJECTION, POWDER, LYOPHILIZED, FOR SOLUTION INTRAVENOUS DAILY
Status: DISCONTINUED | OUTPATIENT
Start: 2020-01-21 | End: 2020-02-04

## 2020-01-21 RX ADMIN — PREDNISONE 40 MG: 20 TABLET ORAL at 20:14

## 2020-01-21 RX ADMIN — SODIUM BICARBONATE 650 MG TABLET 650 MG: at 13:55

## 2020-01-21 RX ADMIN — DOXYCYCLINE HYCLATE 100 MG: 100 TABLET, COATED ORAL at 20:13

## 2020-01-21 RX ADMIN — CALCIUM ACETATE 1334 MG: 667 CAPSULE ORAL at 08:04

## 2020-01-21 RX ADMIN — SEVELAMER CARBONATE 800 MG: 800 TABLET, FILM COATED ORAL at 08:03

## 2020-01-21 RX ADMIN — HEPARIN SODIUM 2780 UNITS: 1000 INJECTION, SOLUTION INTRAVENOUS; SUBCUTANEOUS at 19:31

## 2020-01-21 RX ADMIN — ATORVASTATIN CALCIUM 40 MG: 40 TABLET, FILM COATED ORAL at 20:14

## 2020-01-21 RX ADMIN — CEFEPIME HYDROCHLORIDE 2 G: 2 INJECTION, POWDER, FOR SOLUTION INTRAVENOUS at 00:31

## 2020-01-21 RX ADMIN — CALCIUM ACETATE 1334 MG: 667 CAPSULE ORAL at 16:44

## 2020-01-21 RX ADMIN — SULFAMETHOXAZOLE AND TRIMETHOPRIM 350.4 MG: 80; 16 INJECTION, SOLUTION, CONCENTRATE INTRAVENOUS at 20:07

## 2020-01-21 RX ADMIN — SODIUM BICARBONATE 650 MG TABLET 650 MG: at 08:03

## 2020-01-21 RX ADMIN — PREDNISONE 40 MG: 20 TABLET ORAL at 08:04

## 2020-01-21 RX ADMIN — Medication 800 ML/HR: at 03:40

## 2020-01-21 RX ADMIN — CALCIUM GLUCONATE 1 G: 98 INJECTION, SOLUTION INTRAVENOUS at 18:13

## 2020-01-21 RX ADMIN — SEVELAMER CARBONATE 800 MG: 800 TABLET, FILM COATED ORAL at 12:06

## 2020-01-21 RX ADMIN — CALCIUM GLUCONATE 1 G: 98 INJECTION, SOLUTION INTRAVENOUS at 08:15

## 2020-01-21 RX ADMIN — MIDODRINE HYDROCHLORIDE 5 MG: 5 TABLET ORAL at 08:04

## 2020-01-21 RX ADMIN — METRONIDAZOLE 500 MG: 500 INJECTION, SOLUTION INTRAVENOUS at 12:05

## 2020-01-21 RX ADMIN — SULFAMETHOXAZOLE AND TRIMETHOPRIM 350.4 MG: 80; 16 INJECTION, SOLUTION, CONCENTRATE INTRAVENOUS at 05:58

## 2020-01-21 RX ADMIN — CEFEPIME HYDROCHLORIDE 2 G: 2 INJECTION, POWDER, FOR SOLUTION INTRAVENOUS at 08:15

## 2020-01-21 RX ADMIN — Medication 800 ML/HR: at 10:09

## 2020-01-21 RX ADMIN — CALCIUM ACETATE 1334 MG: 667 CAPSULE ORAL at 12:06

## 2020-01-21 RX ADMIN — SODIUM CHLORIDE, PRESERVATIVE FREE 10 ML: 5 INJECTION INTRAVENOUS at 08:04

## 2020-01-21 RX ADMIN — MIDODRINE HYDROCHLORIDE 5 MG: 5 TABLET ORAL at 16:44

## 2020-01-21 RX ADMIN — CALCIUM GLUCONATE 1 G: 98 INJECTION, SOLUTION INTRAVENOUS at 03:36

## 2020-01-21 RX ADMIN — SODIUM CHLORIDE, PRESERVATIVE FREE 10 ML: 5 INJECTION INTRAVENOUS at 20:13

## 2020-01-21 RX ADMIN — Medication 800 ML/HR: at 03:37

## 2020-01-21 RX ADMIN — SEVELAMER CARBONATE 800 MG: 800 TABLET, FILM COATED ORAL at 16:44

## 2020-01-21 RX ADMIN — Medication 800 ML/HR: at 16:29

## 2020-01-21 RX ADMIN — PANTOPRAZOLE SODIUM 40 MG: 40 INJECTION, POWDER, FOR SOLUTION INTRAVENOUS at 17:45

## 2020-01-21 RX ADMIN — Medication 800 ML/HR: at 16:09

## 2020-01-21 RX ADMIN — CALCITRIOL CAPSULES 0.25 MCG 0.5 MCG: 0.25 CAPSULE ORAL at 08:03

## 2020-01-21 RX ADMIN — VANCOMYCIN HYDROCHLORIDE 1250 MG: 5 INJECTION, POWDER, LYOPHILIZED, FOR SOLUTION INTRAVENOUS at 10:04

## 2020-01-21 RX ADMIN — SODIUM BICARBONATE 650 MG TABLET 650 MG: at 20:13

## 2020-01-21 RX ADMIN — CEFEPIME HYDROCHLORIDE 2 G: 2 INJECTION, POWDER, FOR SOLUTION INTRAVENOUS at 16:46

## 2020-01-21 RX ADMIN — METRONIDAZOLE 500 MG: 500 INJECTION, SOLUTION INTRAVENOUS at 20:01

## 2020-01-21 RX ADMIN — DOXYCYCLINE HYCLATE 100 MG: 100 TABLET, COATED ORAL at 08:04

## 2020-01-21 ASSESSMENT — PAIN SCALES - GENERAL: PAINLEVEL_OUTOF10: 0

## 2020-01-21 NOTE — CONSULTS
Pulmonary Consult Note      Reason for Consult: Request for bronchoscopy in HIV patient  Requesting Physician: Dr. Brenda Mclain  Subjective:   CHIEF COMPLAINT :Intermittent fevers x 1 month    Patient Active Problem List    Diagnosis Date Noted    Acute renal failure ([facility] [address] 01/15/2020        HPI:                The patient is a 45 y.o. male with significant past medical history of None  presents with complaints of intermittent fever x 1 month associated with anorexia and weight loss. He is being treated with abx and his cultures have been negative,. He has been diagnosed with HIV. He is afebrile and hemodynamically stable. Past Medical History:      Diagnosis Date    H. pylori infection     per patient    H/O urinary tract infection     in childhood, per patient      Past Surgical History:    History reviewed. No pertinent surgical history. Current Medications:     metroNIDAZOLE  500 mg Intravenous Q8H    vancomycin (VANCOCIN) intermittent dosing (placeholder)   Other See Admin Instructions    sulfamethoxazole-trimethoprim (BACTRIM) IVPB  5 mg/kg Intravenous Q12H    cefepime  2 g Intravenous Q8H    midodrine  5 mg Oral BID WC    doxycycline hyclate  100 mg Oral 2 times per day    predniSONE  40 mg Oral BID    Followed by   Riaz Seat ON 1/25/2020] predniSONE  40 mg Oral Daily    [START ON 1/30/2020] predniSONE  20 mg Oral Daily    0.9 % sodium chloride  250 mL Intravenous Once    LORazepam  0.5 mg Intravenous Once    metoprolol tartrate  50 mg Oral BID    calcium acetate  1,334 mg Oral TID WC    sevelamer  800 mg Oral TID WC    sodium bicarbonate  650 mg Oral TID    calcitRIOL  0.5 mcg Oral Daily    atorvastatin  40 mg Oral Nightly    sodium chloride flush  10 mL Intravenous 2 times per day     Allergies:    Social History:    TOBACCO:   reports that he has never smoked. He has never used smokeless tobacco.  ETOH:   reports no history of alcohol use.   Patient currently lives independently    Family History:       Problem Relation Age of Onset    Stroke Mother        REVIEW OF SYSTEMS:    CONSTITUTIONAL:  negative for fevers, chills, diaphoresis, activity change, appetite change, fatigue, night sweats and unexpected weight change. EYES:  negative for blurred vision, eye discharge, visual disturbance and icterus  HEENT:  negative for hearing loss, tinnitus, ear drainage, sinus pressure, nasal congestion, epistaxis and snoring  RESPIRATORY:  See HPI  CARDIOVASCULAR:  negative for chest pain, palpitations, exertional chest pressure/discomfort, edema, syncope  GASTROINTESTINAL:  negative for nausea, vomiting, diarrhea, constipation, blood in stool and abdominal pain  GENITOURINARY:  negative for frequency, dysuria, urinary incontinence, decreased urine volume, and hematuria  HEMATOLOGIC/LYMPHATIC:  negative for easy bruising, bleeding and lymphadenopathy  ALLERGIC/IMMUNOLOGIC:  negative for recurrent infections, angioedema, anaphylaxis and drug reactions  ENDOCRINE:  negative for weight changes and diabetic symptoms including polyuria, polydipsia and polyphagia  MUSCULOSKELETAL:  negative for  pain, joint swelling, decreased range of motion and muscle weakness  NEUROLOGICAL:  negative for headaches, slurred speech, unilateral weakness  PSYCHIATRIC/BEHAVIORAL: negative for hallucinations, behavioral problems, confusion and agitation.      Objective:   PHYSICAL EXAM:      VITALS:  /78   Pulse 81   Temp 96.6 °F (35.9 °C)   Resp 17   Ht 5' 2\" (1.575 m)   Wt 147 lb 11.3 oz (67 kg)   SpO2 100%   BMI 27.02 kg/m²   24HR INTAKE/OUTPUT:      Intake/Output Summary (Last 24 hours) at 2020 1219  Last data filed at 2020 1100  Gross per 24 hour   Intake 1267 ml   Output 3738 ml   Net -2471 ml     CURRENT PULSE OXIMETRY:  SpO2: 100 %  24HR PULSE OXIMETRY RANGE:  SpO2  Av.8 %  Min: 96 %  Max: 100 %    CONSTITUTIONAL:  awake, alert, cooperative, no apparent distress, and appears stated age  NECK: Supple, symmetrical, trachea midline, no adenopathy, thyroid symmetric, not enlarged and no tenderness, skin normal  LUNGS:  Occasional basal crackles  CARDIOVASCULAR:  normal S1 and S2, no edema and no JVD  ABDOMEN:  normal bowel sounds, non-distended and no masses palpated, and no tenderness to palpation. No hepatospleenomegaly  LYMPHADENOPATHY:  no axillary or supraclavicular adenopathy. No cervical adnenopathy  PSYCHIATRIC: Oriented to person place and time. No obvious depression or anxiety. MUSCULOSKELETAL: No obvious misalignment or effusion of the joints. No clubbing, cyanosis of the digits. SKIN:  normal skin color, texture, turgor and no redness, warmth, or swelling.  No palpable nodules    DATA:    Old records have been reviewed  CBC with Differential:    Lab Results   Component Value Date    WBC 4.7 01/21/2020    RBC 3.79 01/21/2020    HGB 9.0 01/21/2020    HCT 30.0 01/21/2020     01/21/2020    MCV 79.2 01/21/2020    MCH 23.7 01/21/2020    MCHC 30.0 01/21/2020    RDW 20.4 01/21/2020    SEGSPCT 76.0 01/21/2020    BANDSPCT 13 01/21/2020    LYMPHOPCT 9.0 01/21/2020    MONOPCT 2.0 01/21/2020    BASOPCT 0.2 01/20/2020    MONOSABS 0.1 01/21/2020    LYMPHSABS 0.4 01/21/2020    EOSABS 0.0 01/20/2020    BASOSABS 0.0 01/20/2020    DIFFTYPE MANUAL DIFFERENTIAL 01/21/2020     BMP:    Lab Results   Component Value Date     01/21/2020    K 4.4 01/21/2020    CL 98 01/21/2020    CO2 24 01/21/2020    BUN 30 01/21/2020    CREATININE 4.0 01/21/2020    CALCIUM 7.6 01/21/2020    GFRAA 20 01/21/2020    LABGLOM 17 01/21/2020    GLUCOSE 133 01/21/2020     Hepatic Function Panel:    Lab Results   Component Value Date    ALKPHOS 48 01/21/2020    ALT 9 01/21/2020    AST 17 01/21/2020    PROT 4.9 01/21/2020    BILITOT 0.1 01/21/2020     ABG:    Lab Results   Component Value Date    DVA1QCV 26.5 01/19/2020    VFM9MCR 39.0 01/19/2020    PO2ART 77 01/19/2020       Cultures:   Pending    Radiology Review:  Reviewed Assessment/Plan       Patient Active Problem List    Diagnosis Date Noted    Acute renal failure ([facility] [address] 01/15/2020   Acute hypoxic resp failure  Multifocal pneumonia  HIV  Small- moderate right pleural effusion    PLAN  1. Abx per ID  2. Bronch   3. Nebs  4.c/w present management  5. NPO past midnight  6.  Hold IV Heparin at 6 am          Electronically signed by Kizzy Garcia MD on 1/21/2020 at 12:19 PM

## 2020-01-21 NOTE — PROGRESS NOTES
Hospitalist Progress Note      Name:  Bernadine Dillard. /Age/Sex: 1975  (45 y.o. male)   MRN & CSN:  [id number] & [id number] Admission Date/Time: 1/15/2020  4:05 AM   Location:  -A PCP: No primary care provider on file. Hospital Day: 6    Assessment and Plan:   Bernadine Murillo is a 45 y.o.  male  who presents with:    · Acute DWIGHT/ATN without unknown etiology. After renal biopsy and lab testing, likely cause is newly diagnosed HIV with HIVAN. · Newly diagnosed HIV+ this admission   · Acute respiratory failure with BL pneumonia, found on imaging late . · Hx Hep B based on antibody testing. · Electrolyte imbalance - hyponatremia, hypocalcemia. Improved with IV medications  · Hypoalbuminemia   · [place] ethnicity, works for the [employer] and has been in [employer] for ~3 years.     DENIES - DM, HTN, HLD, stroke, MI, IVDU. DENIES - regular medical care and has no primarily care physician     Appreciate ID consult   Multiple labs pending for hiv work up  Pending micro labs including afb  In isolation for afb and infection risks    Diet Dietary Nutrition Supplements: Renal Oral Supplement  DIET RENAL;   DVT Prophylaxis [] Lovenox, []  Heparin, [] SCDs, []No VTE prophylaxis, patient ambulating   GI Prophylaxis [] PPI, [] H2 Blocker, [] No GI prophylaxis, patient is receiving diet/Tube Feeds   Code Status Full Code   Disposition Patient requires continued admission due to renal bx and pending work up    Dc to home when able   MDM [] Low, [x] Moderate,[]  High  Patient's risk as above due to     [] One or more chronic illnesses with mild exacerbation progression    [] Two or more stable chronic illnesses    [x] Undiagnosed new problem with uncertain prognosis    [] Elective major surgery    []Prescription drug management     History of Present Illness:     Pt S&E.  He is in isolation  No problems per RN today  He doesn't believes he has HIV  +cough +sob +chest pain from cough     10-14 point ROS reviewed negative, unless as noted above    Objective: Intake/Output Summary (Last 24 hours) at 1/20/2020 1906  Last data filed at 1/20/2020 1707  Gross per 24 hour   Intake 1604 ml   Output 3737 ml   Net -2133 ml      Vitals:   Vitals:    01/20/20 1703   BP: 107/86   Pulse: 97   Resp: 24   Temp:    SpO2: 96%     Physical Exam:    GEN Awake male, laying in bed in no apparent distress. Appears given age. EYES Pupils are equally round. No scleral discharge  HENT Atraumatic and symmetric head  NECK No apparent thyromegaly  RESP Symmetric chest movement while on room air. CARDIO/VASC Peripheral pulses equal bilaterally and palpable. No peripheral edema. GI Abdomen is not distended. Rectal exam deferred.  Mcgarry catheter is not present. HEME/LYMPH No petechiae or ecchymoses. MSK Spontaneous movement of BL upper extremities  SKIN Normal coloration, warm, dry. NEURO Cranial nerves appear grossly intact  PSYCH Awake, alert.     Medications:   Medications:    vancomycin (VANCOCIN) intermittent dosing (placeholder)   Other See Admin Instructions    sulfamethoxazole-trimethoprim (BACTRIM) IVPB  5 mg/kg Intravenous Q12H    cefepime  2 g Intravenous Q8H    midodrine  5 mg Oral BID     doxycycline hyclate  100 mg Oral 2 times per day    predniSONE  40 mg Oral BID    Followed by   Farheen Emmett ON 1/25/2020] predniSONE  40 mg Oral Daily    [START ON 1/30/2020] predniSONE  20 mg Oral Daily    0.9 % sodium chloride  250 mL Intravenous Once    LORazepam  0.5 mg Intravenous Once    metoprolol tartrate  50 mg Oral BID    calcium acetate  1,334 mg Oral TID     sevelamer  800 mg Oral TID     sodium bicarbonate  650 mg Oral TID    calcitRIOL  0.5 mcg Oral Daily    atorvastatin  40 mg Oral Nightly    sodium chloride flush  10 mL Intravenous 2 times per day      Infusions:    heparin (porcine) 16 Units/kg/hr (01/20/20 1721)    prismaSATE BGK 4/2.5 800 mL/hr (01/20/20 0620)    prismaSATE BGK 4/2.5 800 mL/hr (01/20/20 1400)    prismaSATE BGK 4/2.5 200 mL/hr (01/19/20 2250)     PRN Meds: diphenhydrAMINE, 50 mg, Nightly PRN  hydrOXYzine, 25 mg, TID PRN  sodium chloride flush, 10 mL, PRN  potassium chloride, 20 mEq, PRN  magnesium sulfate, 1 g, PRN  calcium gluconate IVPB, 1 g, PRN    Or  calcium gluconate IVPB, 2 g, PRN    Or  calcium gluconate IVPB, 3 g, PRN    Or  calcium gluconate IVPB, 4 g, PRN  sodium phosphate IVPB, 6 mmol, PRN    Or  sodium phosphate IVPB, 12 mmol, PRN    Or  sodium phosphate IVPB, 18 mmol, PRN    Or  sodium phosphate IVPB, 24 mmol, PRN  heparin (porcine), 80 Units/kg, PRN  heparin (porcine), 40 Units/kg, PRN  sodium chloride flush, 10 mL, PRN  magnesium hydroxide, 30 mL, Daily PRN  ondansetron, 4 mg, Q6H PRN  acetaminophen, 650 mg, Q6H PRN      Electronically signed by Arielle Mae DO on 1/20/2020 at 7:06 PM

## 2020-01-21 NOTE — PROGRESS NOTES
Renal biopsy results  Small sample size    Light microscopy- 2 glomeruli-  1 global sclerotic and not able to see interstitum to define amount fibrosis  IF- collapsing proliferative pattern with tubular injury- appear acute    In above setting HIVAN most likely and will also check APOL1     Full report to follow

## 2020-01-21 NOTE — PROGRESS NOTES
Hospitalist Progress Note      Name:  Jose Wilcox /Age/Sex: 1975  (45 y.o. male)   MRN & CSN:  [id number] & [id number] Admission Date/Time: 1/15/2020  4:05 AM   Location:  -A PCP: No primary care provider on file. Hospital Day: 7    History of Present Illness:     Chief Complaint: Jose Wilcox is a 45 y.o.  male  who presents with intermittent fever     The patient seen and examined at bedside. He denies having any chest pain or shortness of breath. Ten point ROS reviewed negative, unless as noted above    Objective: Intake/Output Summary (Last 24 hours) at 2020 1606  Last data filed at 2020 1601  Gross per 24 hour   Intake 1111 ml   Output 3835 ml   Net -2724 ml      Vitals:   Vitals:    20 1534   BP:    Pulse:    Resp:    Temp: 97.5 °F (36.4 °C)   SpO2:      Physical Exam:   General Appearance: alert and oriented to person, place and time, in no acute distress  Cardiovascular: normal rate, regular rhythm, normal S1 and S2, no murmurs, rubs, clicks, or gallops, distal pulses intact, no carotid bruits, no JVD  Pulmonary/Chest: Air entry bilaterally equal.  There is occasional crackles bilaterally.   Abdomen: soft, non-tender, non-distended, normal bowel sounds, no masses   Extremities: no cyanosis, clubbing or edema, pulse   Skin: warm and dry, no rash or erythema  Head: normocephalic and atraumatic  Eyes: pupils equal, round, and reactive to light  Neck: supple and non-tender without mass, no thyromegaly   Musculoskeletal: normal range of motion, no joint swelling, deformity or tenderness  Neurological: alert, oriented, normal speech, no focal findings or movement disorder noted    Medications:   Medications:    metroNIDAZOLE  500 mg Intravenous Q8H    sulfamethoxazole-trimethoprim (BACTRIM) IVPB  5 mg/kg Intravenous Q12H    cefepime  2 g Intravenous Q8H    midodrine  5 mg Oral BID WC    doxycycline hyclate  100 mg Oral 2 times per day    01/21/2020    LABALBU 40 01/15/2020    CREATININE 4.0 01/21/2020    CALCIUM 7.6 01/21/2020    GFRAA 20 01/21/2020    LABGLOM 17 01/21/2020    GLUCOSE 133 01/21/2020     Ct Abdomen Pelvis Wo Contrast Additional Contrast? None    Result Date: 1/16/2020  EXAMINATION: CT OF THE ABDOMEN AND PELVIS WITHOUT CONTRAST 1/16/2020 8:10 pm TECHNIQUE: CT of the abdomen and pelvis was performed without the administration of intravenous contrast. Multiplanar reformatted images are provided for review. Dose modulation, iterative reconstruction, and/or weight based adjustment of the mA/kV was utilized to reduce the radiation dose to as low as reasonably achievable. COMPARISON: None. HISTORY: ORDERING SYSTEM PROVIDED HISTORY: evalaute anemia and renal failure TECHNOLOGIST PROVIDED HISTORY: Reason for exam:->evalaute anemia and renal failure Additional Contrast?->None Reason for Exam: evalaute anemia and renal failure, cough Acuity: Unknown Type of Exam: Initial Additional signs and symptoms: none Relevant Medical/Surgical History: no sx FINDINGS: Lower Chest: Mild patchy ground-glass opacity in the right lower lobe with trace layering right pleural effusion. Left lung base clear. Organs: Limited evaluation due lack of intravenous contrast.  Liver, gallbladder, biliary system, pancreas, spleen, adrenal glands, and right kidney are unremarkable. There is a punctate nonobstructing calculus in the otherwise unremarkable left kidney. There is no hydronephrosis or perinephric stranding. GI/Bowel: There is wall thickening of the descending and transverse portions of the duodenum with surrounding inflammatory stranding suboptimally evaluated due to lack of intravenous contrast.  The bowel is otherwise unremarkable. .  The appendix is normal. Pelvis: The urinary bladder is unremarkable. There is no pelvic mass. Peritoneum/Retroperitoneum: Small amount of free fluid in the pelvic cavity. No free air or focal fluid collection.   Mildly enlarged retroperitoneal lymph nodes. Abdominal aorta normal in caliber. Bones/Soft Tissues: No acute osseous abnormality. Mild anasarca. 1. Duodenal wall thickening with surrounding stranding concerning for duodenitis. No evidence of perforation. 2. Punctate nonobstructing left renal calculus. No acute obstructive uropathy. 3. Small amount of free fluid in the pelvic cavity is favored reactive. 4. Mildly enlarged retroperitoneal lymph nodes may reflect reactive adenopathy, lymphoproliferative disorder, or metastatic disease. 5. Mild edema in the right lower lobe with trace layering right pleural effusion. 6. Mild anasarca. Xr Chest Standard (2 Vw)    Result Date: 1/15/2020  EXAMINATION: TWO XRAY VIEWS OF THE CHEST 1/15/2020 5:42 am COMPARISON: None. HISTORY: ORDERING SYSTEM PROVIDED HISTORY: Chest pain TECHNOLOGIST PROVIDED HISTORY: Reason for exam:->Chest pain Reason for Exam: chest pain Acuity: Acute Type of Exam: Initial FINDINGS: The cardiac silhouette is within normal limits for size. There is no focal consolidation, pleural effusion or pneumothorax. The visualized osseous structures demonstrate no acute abnormality. No acute cardiopulmonary abnormality. Xr Chest Portable    Result Date: 1/16/2020  EXAMINATION: ONE XRAY VIEW OF THE CHEST 1/15/2020 7:55 am COMPARISON: 01/15/2020. HISTORY: ORDERING SYSTEM PROVIDED HISTORY: SOB TECHNOLOGIST PROVIDED HISTORY: Reason for exam:->SOB Reason for Exam: SOB Acuity: Acute Type of Exam: Subsequent/Follow-up FINDINGS: Interval placement of a right jugular central venous catheter with the tip at the level of the SVC/right atrial junction. No evidence of a pneumothorax. The cardiac silhouette appears within normal limits for size given portable technique. There is no focal consolidation. No pleural effusion or pneumothorax. 1. Right jugular central venous catheter with the tip at the level of the SVC/right atrial junction. 2. No pneumothorax.  3. No The catheter flushed easily and there was a good blood return. The catheter was sutured to the skin. The catheter was locked with heparinized saline. The patient tolerated the procedure well and there were no immediate complications. EBL: Less than 5 cc FINDINGS: Fluoroscopic image demonstrates the tip of the catheter in the right atrium. Successful ultrasound and fluoroscopy guided non-tunneled HD catheter placement. Catheter is okay for use. Assessment and Plan:   Nunu Rodrigues is a 45 y.o.  male  who presents with on and off fever    Acute hypoxic respiratory failure secondary to bilateral pneumonia and pulmonary embolism-improving  Extensive airspace disease per CT chest  Patient is HIV positive  On treatment with Bactrim for possible PJP pneumonia   ID following, on prednisone, cefepime and doxycycline  MRSA negative, vancomycin discontinued  On heparin drip for pulmonary embolism    Sepsis likely from pneumonia  Antibiotics as above  Currently PJP PCR pending   On negative suction for ruling out TB  Sputum cultures pending  Continue antibiotics as above    Pulmonary embolism  Heparin drip as above  Will change to oral    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     Hypothermia-multifactorial including sepsis as above  On Lianna joya, currently improved    Newly diagnosed HIV infection  HIV viral load, genotype and CD4 count pending  Treatment as above    Hypertension-continue metoprolol    Diet Dietary Nutrition Supplements: Renal Oral Supplement  DIET RENAL;  Diet NPO Effective Now Exceptions are: Sips with Meds   DVT Prophylaxis [] Lovenox, [x]  Heparin, [] SCDs, [] Ambulation   GI Prophylaxis [x] PPI,  [] H2 Blocker,  [] Carafate,  [] Diet/Tube Feeds   Code Status Full Code   Disposition Patient requires continued admission due to respiratory failure   MDM [] Low, [] Moderate,[x] High  Patient's risk as above due to multiple conditions including respiratory failure, kidney failure     Electronically signed by Mendel Mailman, MD on 1/21/2020 at 4:06 PM

## 2020-01-21 NOTE — PROGRESS NOTES
Nephrology Progress Note  1/21/2020 10:50 AM  Subjective:   Admit Date: 1/15/2020  PCP: No primary care provider on file. Interval History: pt awake weak and on crrt. Will stop and plan start IHD in am as no pressor and I dw him care and bx with HIVAN most likley,. dw him about living here or [place] and [facility] if eligible. Will need help with financial and social care and appear cousin in [place] is closest family he has. Diet: Dietary Nutrition Supplements: Renal Oral Supplement  DIET RENAL;  Pain is: Moderate      Data:   Scheduled Meds:   vancomycin  1,250 mg Intravenous Once    vancomycin (VANCOCIN) intermittent dosing (placeholder)   Other See Admin Instructions    sulfamethoxazole-trimethoprim (BACTRIM) IVPB  5 mg/kg Intravenous Q12H    cefepime  2 g Intravenous Q8H    midodrine  5 mg Oral BID WC    doxycycline hyclate  100 mg Oral 2 times per day    predniSONE  40 mg Oral BID    Followed by   Nay Brenner ON 1/25/2020] predniSONE  40 mg Oral Daily    [START ON 1/30/2020] predniSONE  20 mg Oral Daily    0.9 % sodium chloride  250 mL Intravenous Once    LORazepam  0.5 mg Intravenous Once    metoprolol tartrate  50 mg Oral BID    calcium acetate  1,334 mg Oral TID WC    sevelamer  800 mg Oral TID WC    sodium bicarbonate  650 mg Oral TID    calcitRIOL  0.5 mcg Oral Daily    atorvastatin  40 mg Oral Nightly    sodium chloride flush  10 mL Intravenous 2 times per day     Continuous Infusions:   heparin (porcine) 12 Units/kg/hr (01/21/20 0719)    prismaSATE BGK 4/2.5 800 mL/hr (01/21/20 1009)    prismaSATE BGK 4/2.5 800 mL/hr (01/21/20 1009)    prismaSATE BGK 4/2.5 200 mL/hr (01/19/20 2250)     PRN Meds:diphenhydrAMINE, hydrOXYzine, sodium chloride flush, potassium chloride, magnesium sulfate, calcium gluconate IVPB **OR** calcium gluconate IVPB **OR** calcium gluconate IVPB **OR** calcium gluconate IVPB, sodium phosphate IVPB **OR** sodium phosphate IVPB **OR** sodium

## 2020-01-21 NOTE — PROGRESS NOTES
air movement. Heart: RRR and no MRG. Abd: soft, non-distended, no tenderness, no hepatomegaly. Normoactive bowel sounds. Ext: no clubbing, cyanosis, or edema  Catheter Site: without erythema or tenderness  Neuro: Mental status intact. CN 2-12 intact and no focal sensory or motor deficits     Radiologic / Imaging / TESTING  No results found.      Labs:    Recent Results (from the past 24 hour(s))   Procalcitonin    Collection Time: 01/20/20  1:01 PM   Result Value Ref Range    Procalcitonin 6.91    C-Reactive Protein    Collection Time: 01/20/20  1:01 PM   Result Value Ref Range    CRP, High Sensitivity 112.8 mg/L   Calcium, Ionized    Collection Time: 01/20/20  4:20 PM   Result Value Ref Range    Ionized Ca 1.01 (L) 1.12 - 1.32 mMOL/L    Calcium, Ion 4.04 (L) 4.48 - 5.28 MG/DL   Comprehensive Metabolic Panel w/ Reflex to MG    Collection Time: 01/20/20  4:20 PM   Result Value Ref Range    Sodium 134 (L) 135 - 145 MMOL/L    Potassium 4.4 3.5 - 5.1 MMOL/L    Chloride 96 (L) 99 - 110 mMol/L    CO2 25 21 - 32 MMOL/L    BUN 29 (H) 6 - 23 MG/DL    CREATININE 4.2 (H) 0.9 - 1.3 MG/DL    Glucose 148 (H) 70 - 99 MG/DL    Calcium 7.2 (L) 8.3 - 10.6 MG/DL    Alb 2.1 (L) 3.4 - 5.0 GM/DL    Total Protein 4.8 (L) 6.4 - 8.2 GM/DL    Total Bilirubin 0.2 0.0 - 1.0 MG/DL    ALT 9 (L) 10 - 40 U/L    AST 18 15 - 37 IU/L    Alkaline Phosphatase 50 40 - 129 IU/L    GFR Non- 16 (L) >60 mL/min/1.73m2    GFR  19 (L) >60 mL/min/1.73m2    Anion Gap 13 4 - 16   Magnesium    Collection Time: 01/20/20  4:20 PM   Result Value Ref Range    Magnesium 2.2 1.8 - 2.4 mg/dl   Phosphorus    Collection Time: 01/20/20  4:20 PM   Result Value Ref Range    Phosphorus 3.4 2.5 - 4.9 MG/DL   APTT    Collection Time: 01/20/20  4:20 PM   Result Value Ref Range    aPTT 54.2 (H) 25.1 - 37.1 SECONDS   APTT    Collection Time: 01/20/20 10:00 PM   Result Value Ref Range    aPTT 165.8 (H) 25.1 - 37.1 SECONDS   Calcium, Ionized WBC Morphology OCCASIONAL  TOXIC GRANULATION      Renal Function Panel    Collection Time: 01/21/20  5:35 AM   Result Value Ref Range    Sodium 135 135 - 145 MMOL/L    Potassium 4.4 3.5 - 5.1 MMOL/L    Chloride 98 (L) 99 - 110 mMol/L    CO2 24 21 - 32 MMOL/L    Anion Gap 13 4 - 16    BUN 30 (H) 6 - 23 MG/DL    CREATININE 4.0 (H) 0.9 - 1.3 MG/DL    Glucose 133 (H) 70 - 99 MG/DL    Calcium 7.6 (L) 8.3 - 10.6 MG/DL    GFR Non- 17 (L) >60 mL/min/1.73m2    GFR  20 (L) >60 mL/min/1.73m2    Alb 2.4 (L) 3.4 - 5.0 GM/DL    Phosphorus 4.5 2.5 - 4.9 MG/DL   Vancomycin, random    Collection Time: 01/21/20  5:35 AM   Result Value Ref Range    Vancomycin Rm 7.9 UG/ML    Vancomycin Rm  UG/ML     (NOTE)  Therapeutic Range Interpretation: Please refer to current dosing   guidelines. DOSE AMOUNT DOSE AMT. GIVEN - `     DOSE TIME DOSE TIME GIVEN - `    Calcium, Ionized    Collection Time: 01/21/20  5:35 AM   Result Value Ref Range    Ionized Ca 1.05 (L) 1.12 - 1.32 mMOL/L    Calcium, Ion 4.20 (L) 4.48 - 5.28 MG/DL   APTT    Collection Time: 01/21/20 10:00 AM   Result Value Ref Range    aPTT 69.7 (H) 25.1 - 37.1 SECONDS     CULTURE results: Invalid input(s): BLOOD CULTURE,  URINE CULTURE, SURGICAL CULTURE    Diagnosis:  Patient Active Problem List   Diagnosis    Acute renal failure ([facility] [address]       Active Problems  Active Problems:    Acute renal failure ([facility] [address]  Resolved Problems:    * No resolved hospital problems.  *    Electronically signed by: Electronically signed by Shannan Morales MD on 1/21/2020 at 11:35 AM

## 2020-01-22 ENCOUNTER — ANESTHESIA (OUTPATIENT)
Dept: ICU | Age: 45
End: 2020-01-22

## 2020-01-22 LAB
1,3 BETA-D-GLUCAN INTERP: NEGATIVE
1,3 BETA-D-GLUCAN INTERP: NORMAL
1,3 BETA-D-GLUCAN: <31 PG/ML
ALBUMIN SERPL-MCNC: 2.2 GM/DL (ref 3.4–5)
ANION GAP SERPL CALCULATED.3IONS-SCNC: 12 MMOL/L (ref 4–16)
APTT: 34.6 SECONDS (ref 25.1–37.1)
APTT: 63.3 SECONDS (ref 25.1–37.1)
APTT: 94.3 SECONDS (ref 25.1–37.1)
BASOPHILS ABSOLUTE: 0 K/CU MM
BASOPHILS RELATIVE PERCENT: 0.1 % (ref 0–1)
BUN BLDV-MCNC: 32 MG/DL (ref 6–23)
CALCIUM SERPL-MCNC: 7.7 MG/DL (ref 8.3–10.6)
CHLORIDE BLD-SCNC: 95 MMOL/L (ref 99–110)
CO2: 23 MMOL/L (ref 21–32)
CREAT SERPL-MCNC: 4.3 MG/DL (ref 0.9–1.3)
DIFFERENTIAL TYPE: ABNORMAL
EOSINOPHILS ABSOLUTE: 0 K/CU MM
EOSINOPHILS RELATIVE PERCENT: 0 % (ref 0–3)
GFR AFRICAN AMERICAN: 18 ML/MIN/1.73M2
GFR NON-AFRICAN AMERICAN: 15 ML/MIN/1.73M2
GLUCOSE BLD-MCNC: 110 MG/DL (ref 70–99)
HCT VFR BLD CALC: 25.5 % (ref 42–52)
HEMOGLOBIN: 7.5 GM/DL (ref 13.5–18)
HISTOPLASMA ANTIGEN, SERUM: NORMAL
HISTOPLASMA ANTIGEN, SERUM: NOT DETECTED
IMMATURE NEUTROPHIL %: 0.7 % (ref 0–0.43)
INTERPRETATION: NORMAL
INTERPRETATION: NOT DETECTED
LEGIONELLA URINARY AG: NEGATIVE
LYMPHOCYTES ABSOLUTE: 0.5 K/CU MM
LYMPHOCYTES RELATIVE PERCENT: 4.3 % (ref 24–44)
MCH RBC QN AUTO: 23.9 PG (ref 27–31)
MCHC RBC AUTO-ENTMCNC: 29.4 % (ref 32–36)
MCV RBC AUTO: 81.2 FL (ref 78–100)
MONOCYTES ABSOLUTE: 0.2 K/CU MM
MONOCYTES RELATIVE PERCENT: 1.2 % (ref 0–4)
NUCLEATED RBC %: 0.2 %
PDW BLD-RTO: 21.1 % (ref 11.7–14.9)
PHOSPHORUS: 5.2 MG/DL (ref 2.5–4.9)
PLATELET # BLD: 245 K/CU MM (ref 140–440)
PMV BLD AUTO: 11.5 FL (ref 7.5–11.1)
POTASSIUM SERPL-SCNC: 4.7 MMOL/L (ref 3.5–5.1)
RBC # BLD: 3.14 M/CU MM (ref 4.6–6.2)
RPR: NON REACTIVE
SEGMENTED NEUTROPHILS ABSOLUTE COUNT: 11.5 K/CU MM
SEGMENTED NEUTROPHILS RELATIVE PERCENT: 93.7 % (ref 36–66)
SODIUM BLD-SCNC: 130 MMOL/L (ref 135–145)
STREP PNEUMONIAE ANTIGEN: NORMAL
TOTAL IMMATURE NEUTOROPHIL: 0.08 K/CU MM
TOTAL NUCLEATED RBC: 0 K/CU MM
TOXOPLASMA GONDII AB IGG: 60.6 IU/ML
TOXOPLASMA GONDII AB IGG: NORMAL IU/ML
TOXOPLASMA GONDII AB IGM: <3 AU/ML
TOXOPLASMA GONDII AB IGM: NORMAL AU/ML
WBC # BLD: 12.3 K/CU MM (ref 4–10.5)

## 2020-01-22 PROCEDURE — C9113 INJ PANTOPRAZOLE SODIUM, VIA: HCPCS | Performed by: INTERNAL MEDICINE

## 2020-01-22 PROCEDURE — 87116 MYCOBACTERIA CULTURE: CPT

## 2020-01-22 PROCEDURE — 90935 HEMODIALYSIS ONE EVALUATION: CPT

## 2020-01-22 PROCEDURE — 6370000000 HC RX 637 (ALT 250 FOR IP): Performed by: INTERNAL MEDICINE

## 2020-01-22 PROCEDURE — 6360000002 HC RX W HCPCS: Performed by: INTERNAL MEDICINE

## 2020-01-22 PROCEDURE — 80069 RENAL FUNCTION PANEL: CPT

## 2020-01-22 PROCEDURE — 2500000003 HC RX 250 WO HCPCS: Performed by: INTERNAL MEDICINE

## 2020-01-22 PROCEDURE — 85730 THROMBOPLASTIN TIME PARTIAL: CPT

## 2020-01-22 PROCEDURE — P9047 ALBUMIN (HUMAN), 25%, 50ML: HCPCS | Performed by: INTERNAL MEDICINE

## 2020-01-22 PROCEDURE — 99233 SBSQ HOSP IP/OBS HIGH 50: CPT | Performed by: INTERNAL MEDICINE

## 2020-01-22 PROCEDURE — 6370000000 HC RX 637 (ALT 250 FOR IP): Performed by: STUDENT IN AN ORGANIZED HEALTH CARE EDUCATION/TRAINING PROGRAM

## 2020-01-22 PROCEDURE — 85025 COMPLETE CBC W/AUTO DIFF WBC: CPT

## 2020-01-22 PROCEDURE — 6370000000 HC RX 637 (ALT 250 FOR IP): Performed by: NURSE PRACTITIONER

## 2020-01-22 PROCEDURE — 6360000002 HC RX W HCPCS: Performed by: STUDENT IN AN ORGANIZED HEALTH CARE EDUCATION/TRAINING PROGRAM

## 2020-01-22 PROCEDURE — 99232 SBSQ HOSP IP/OBS MODERATE 35: CPT | Performed by: INTERNAL MEDICINE

## 2020-01-22 PROCEDURE — 2580000003 HC RX 258: Performed by: INTERNAL MEDICINE

## 2020-01-22 PROCEDURE — 2000000000 HC ICU R&B

## 2020-01-22 PROCEDURE — 2580000003 HC RX 258: Performed by: STUDENT IN AN ORGANIZED HEALTH CARE EDUCATION/TRAINING PROGRAM

## 2020-01-22 RX ORDER — HEPARIN SODIUM 1000 [USP'U]/ML
2600 INJECTION, SOLUTION INTRAVENOUS; SUBCUTANEOUS
Status: COMPLETED | OUTPATIENT
Start: 2020-01-22 | End: 2020-01-22

## 2020-01-22 RX ORDER — ALBUMIN (HUMAN) 12.5 G/50ML
25 SOLUTION INTRAVENOUS ONCE
Status: COMPLETED | OUTPATIENT
Start: 2020-01-22 | End: 2020-01-22

## 2020-01-22 RX ADMIN — SULFAMETHOXAZOLE AND TRIMETHOPRIM 350.4 MG: 80; 16 INJECTION, SOLUTION, CONCENTRATE INTRAVENOUS at 06:19

## 2020-01-22 RX ADMIN — CEFEPIME HYDROCHLORIDE 2 G: 2 INJECTION, POWDER, FOR SOLUTION INTRAVENOUS at 17:11

## 2020-01-22 RX ADMIN — CALCIUM ACETATE 1334 MG: 667 CAPSULE ORAL at 13:52

## 2020-01-22 RX ADMIN — SODIUM CHLORIDE, PRESERVATIVE FREE 10 ML: 5 INJECTION INTRAVENOUS at 20:39

## 2020-01-22 RX ADMIN — SODIUM BICARBONATE 650 MG TABLET 650 MG: at 20:41

## 2020-01-22 RX ADMIN — DOXYCYCLINE HYCLATE 100 MG: 100 TABLET, COATED ORAL at 20:39

## 2020-01-22 RX ADMIN — METRONIDAZOLE 500 MG: 500 INJECTION, SOLUTION INTRAVENOUS at 13:53

## 2020-01-22 RX ADMIN — MIDODRINE HYDROCHLORIDE 5 MG: 5 TABLET ORAL at 17:10

## 2020-01-22 RX ADMIN — CEFEPIME HYDROCHLORIDE 2 G: 2 INJECTION, POWDER, FOR SOLUTION INTRAVENOUS at 00:21

## 2020-01-22 RX ADMIN — HEPARIN SODIUM 2600 UNITS: 1000 INJECTION, SOLUTION INTRAVENOUS; SUBCUTANEOUS at 13:15

## 2020-01-22 RX ADMIN — PANTOPRAZOLE SODIUM 40 MG: 40 INJECTION, POWDER, FOR SOLUTION INTRAVENOUS at 08:48

## 2020-01-22 RX ADMIN — SODIUM BICARBONATE 650 MG TABLET 650 MG: at 13:51

## 2020-01-22 RX ADMIN — CEFEPIME HYDROCHLORIDE 2 G: 2 INJECTION, POWDER, FOR SOLUTION INTRAVENOUS at 08:48

## 2020-01-22 RX ADMIN — PREDNISONE 40 MG: 20 TABLET ORAL at 13:54

## 2020-01-22 RX ADMIN — CALCIUM ACETATE 1334 MG: 667 CAPSULE ORAL at 17:11

## 2020-01-22 RX ADMIN — ONDANSETRON HYDROCHLORIDE 4 MG: 2 INJECTION, SOLUTION INTRAMUSCULAR; INTRAVENOUS at 20:40

## 2020-01-22 RX ADMIN — ATORVASTATIN CALCIUM 40 MG: 40 TABLET, FILM COATED ORAL at 20:39

## 2020-01-22 RX ADMIN — ACETAMINOPHEN 650 MG: 325 TABLET ORAL at 20:40

## 2020-01-22 RX ADMIN — PREDNISONE 40 MG: 20 TABLET ORAL at 20:40

## 2020-01-22 RX ADMIN — METRONIDAZOLE 500 MG: 500 INJECTION, SOLUTION INTRAVENOUS at 05:00

## 2020-01-22 RX ADMIN — DOXYCYCLINE HYCLATE 100 MG: 100 TABLET, COATED ORAL at 13:54

## 2020-01-22 RX ADMIN — ALBUMIN (HUMAN) 25 G: 0.25 INJECTION, SOLUTION INTRAVENOUS at 10:17

## 2020-01-22 RX ADMIN — DIPHENHYDRAMINE HYDROCHLORIDE 50 MG: 25 TABLET ORAL at 20:39

## 2020-01-22 RX ADMIN — SEVELAMER CARBONATE 800 MG: 800 TABLET, FILM COATED ORAL at 13:52

## 2020-01-22 RX ADMIN — HEPARIN SODIUM AND DEXTROSE 14 UNITS/KG/HR: 10000; 5 INJECTION INTRAVENOUS at 07:41

## 2020-01-22 RX ADMIN — SODIUM CHLORIDE, PRESERVATIVE FREE 10 ML: 5 INJECTION INTRAVENOUS at 09:02

## 2020-01-22 RX ADMIN — SEVELAMER CARBONATE 800 MG: 800 TABLET, FILM COATED ORAL at 17:11

## 2020-01-22 RX ADMIN — SULFAMETHOXAZOLE AND TRIMETHOPRIM 337.6 MG: 80; 16 INJECTION, SOLUTION, CONCENTRATE INTRAVENOUS at 20:39

## 2020-01-22 RX ADMIN — EPOETIN ALFA-EPBX 10000 UNITS: 10000 INJECTION, SOLUTION INTRAVENOUS; SUBCUTANEOUS at 12:51

## 2020-01-22 RX ADMIN — METRONIDAZOLE 500 MG: 500 INJECTION, SOLUTION INTRAVENOUS at 20:40

## 2020-01-22 ASSESSMENT — PAIN SCALES - GENERAL
PAINLEVEL_OUTOF10: 2
PAINLEVEL_OUTOF10: 2
PAINLEVEL_OUTOF10: 0

## 2020-01-22 ASSESSMENT — PAIN DESCRIPTION - LOCATION: LOCATION: GENERALIZED

## 2020-01-22 ASSESSMENT — PAIN DESCRIPTION - PAIN TYPE: TYPE: ACUTE PAIN

## 2020-01-22 NOTE — PROGRESS NOTES
Infectious Disease Progress Note  2020   Patient Name: Jacy Trent. : 1975   Impression  · Bilateral pneumonia with hypoxic respiratory failure  ? afebrile for 24 hours. Hypothermia may be due to CRRT. ? Fungitell negative, PJP uncertain, ? Toxoplasma pneumonitis  ? MRSA nares negative, Fungitell negative, toxoplasma IgG positive  · Acute kidney injury with ATN  ? Required CRRT  ? S/p left renal biopsy with CT showing pararenal fluid likely blood/hematoma  · Probable Advanced HIV  ? Yet to verify but clinical symptoms and low CD4: 20, fits picture  · Past hepatitis B infection  ? Hepatitis B surface antibody positive, anti-HB C antibody positive, hepatitis B surface antigen negative  ? Hepatitis C antibody negative  · Multi-morbidity: per PMHx  Plan:  · Agree with d/c vancomycin,   · Continue cefepime, doxycycline, metronidazole, Bactrim and prednisone  · Start Metronidazole  · Pulmonology consult for bronchoscopy and BAL for PJP DFA, PJP PCR, toxoplasma PCR, AFB, fungal, bacterial culture  · Follow-up HIV viral load, HIV genotype  · f/u sputum for AFB, bacterial culture, PJP PCR  · f/u fungal serology, check histoplasma antigen, Blastomyces antigen, cryptococcus antigen, AFB culture, fungal blood culture    Ongoing Antimicrobial Therapy  Cefepime   Doxycycline   Metronidazole   Bactrim   Completed Antimicrobial Therapy  Vancomycin -  ? History:? Interval history noted, bilateral pneumonia  Continues to cough but its non-productive  Physical Exam:  Vital Signs: /87   Pulse 89   Temp 97.8 °F (36.6 °C) (Oral)   Resp 23   Ht 5' 2\" (1.575 m)   Wt 148 lb 9.4 oz (67.4 kg)   SpO2 100%   BMI 27.18 kg/m²     Gen: alert and oriented X3, no distress  Skin: no stigmata of endocarditis  Wounds: C/D/I  HEMT: AT/NC Oropharynx pink, moist, and without lesions or exudates; dentition in good state of repair  Eyes: PERRLA, EOMI, conjunctiva pink, sclera anicteric. Neck: Supple. Trachea midline. No LAD. Chest: no distress and CTA. Good air movement. Heart: RRR and no MRG. Abd: soft, non-distended, no tenderness, no hepatomegaly. Normoactive bowel sounds. Ext: no clubbing, cyanosis, or edema  Catheter Site: without erythema or tenderness  Neuro: Mental status intact. CN 2-12 intact and no focal sensory or motor deficits     Radiologic / Imaging / TESTING  No results found.      Labs:    Recent Results (from the past 24 hour(s))   APTT    Collection Time: 01/21/20 10:00 AM   Result Value Ref Range    aPTT 69.7 (H) 25.1 - 37.1 SECONDS   Calcium, Ionized    Collection Time: 01/21/20  4:20 PM   Result Value Ref Range    Ionized Ca 1.01 (L) 1.12 - 1.32 mMOL/L    Calcium, Ion 4.04 (L) 4.48 - 5.28 MG/DL   Comprehensive Metabolic Panel w/ Reflex to MG    Collection Time: 01/21/20  4:20 PM   Result Value Ref Range    Sodium 134 (L) 135 - 145 MMOL/L    Potassium 4.7 3.5 - 5.1 MMOL/L    Chloride 98 (L) 99 - 110 mMol/L    CO2 26 21 - 32 MMOL/L    BUN 23 6 - 23 MG/DL    CREATININE 3.0 (H) 0.9 - 1.3 MG/DL    Glucose 112 (H) 70 - 99 MG/DL    Calcium 7.3 (L) 8.3 - 10.6 MG/DL    Alb 2.1 (L) 3.4 - 5.0 GM/DL    Total Protein 4.6 (L) 6.4 - 8.2 GM/DL    Total Bilirubin 0.1 0.0 - 1.0 MG/DL    ALT 7 (L) 10 - 40 U/L    AST 14 (L) 15 - 37 IU/L    Alkaline Phosphatase 41 40 - 129 IU/L    GFR Non- 23 (L) >60 mL/min/1.73m2    GFR  28 (L) >60 mL/min/1.73m2    Anion Gap 10 4 - 16   Magnesium    Collection Time: 01/21/20  4:20 PM   Result Value Ref Range    Magnesium 2.4 1.8 - 2.4 mg/dl   Phosphorus    Collection Time: 01/21/20  4:20 PM   Result Value Ref Range    Phosphorus 3.5 2.5 - 4.9 MG/DL   APTT    Collection Time: 01/21/20  4:20 PM   Result Value Ref Range    aPTT 53.4 (H) 25.1 - 37.1 SECONDS   CBC Auto Differential    Collection Time: 01/22/20  5:05 AM   Result Value Ref Range    WBC 12.3 (H) 4.0 - 10.5 K/CU MM    RBC 3.14 (L) 4.6 - 6.2 M/CU MM    Hemoglobin 7.5 (L) 13.5 - 18.0 GM/DL    Hematocrit 25.5 (L) 42 - 52 %    MCV 81.2 78 - 100 FL    MCH 23.9 (L) 27 - 31 PG    MCHC 29.4 (L) 32.0 - 36.0 %    RDW 21.1 (H) 11.7 - 14.9 %    Platelets 052 078 - 016 K/CU MM    MPV 11.5 (H) 7.5 - 11.1 FL    Differential Type AUTOMATED DIFFERENTIAL     Segs Relative 93.7 (H) 36 - 66 %    Lymphocytes % 4.3 (L) 24 - 44 %    Monocytes % 1.2 0 - 4 %    Eosinophils % 0.0 0 - 3 %    Basophils % 0.1 0 - 1 %    Segs Absolute 11.5 K/CU MM    Lymphocytes Absolute 0.5 K/CU MM    Monocytes Absolute 0.2 K/CU MM    Eosinophils Absolute 0.0 K/CU MM    Basophils Absolute 0.0 K/CU MM    Nucleated RBC % 0.2 %    Total Nucleated RBC 0.0 K/CU MM    Total Immature Neutrophil 0.08 K/CU MM    Immature Neutrophil % 0.7 (H) 0 - 0.43 %   Renal Function Panel    Collection Time: 01/22/20  5:05 AM   Result Value Ref Range    Sodium 130 (L) 135 - 145 MMOL/L    Potassium 4.7 3.5 - 5.1 MMOL/L    Chloride 95 (L) 99 - 110 mMol/L    CO2 23 21 - 32 MMOL/L    Anion Gap 12 4 - 16    BUN 32 (H) 6 - 23 MG/DL    CREATININE 4.3 (H) 0.9 - 1.3 MG/DL    Glucose 110 (H) 70 - 99 MG/DL    Calcium 7.7 (L) 8.3 - 10.6 MG/DL    GFR Non-African American 15 (L) >60 mL/min/1.73m2    GFR  18 (L) >60 mL/min/1.73m2    Alb 2.2 (L) 3.4 - 5.0 GM/DL    Phosphorus 5.2 (H) 2.5 - 4.9 MG/DL   APTT    Collection Time: 01/22/20  5:05 AM   Result Value Ref Range    aPTT 94.3 (H) 25.1 - 37.1 SECONDS     CULTURE results: Invalid input(s): BLOOD CULTURE,  URINE CULTURE, SURGICAL CULTURE    Diagnosis:  Patient Active Problem List   Diagnosis    Acute renal failure (HCC)    Elevated serum creatinine       Active Problems  Active Problems:    Acute renal failure (HCC)    Elevated serum creatinine  Resolved Problems:    * No resolved hospital problems.  *    Electronically signed by: Electronically signed by Alexandro Harley MD on 1/22/2020 at 8:09 AM

## 2020-01-22 NOTE — PROGRESS NOTES
Ordered 16:00 aPTT was sent to lab--at 16:20. At 1850, all labs were resulted, except aPTT, Lab called and was in formed that when blue tube arrived with proper label applied. APTT was set aside and not ran because they stated aPTT was canceled however, the order was on pts chart. New order placed for state aPTT.

## 2020-01-22 NOTE — PROGRESS NOTES
Patient was continued on heparin iv until 0903 am. Heparin was supposed to be held at [facility]. Dr. Agarwal Manual informed and he wants to proceed with the procedure.

## 2020-01-22 NOTE — ANESTHESIA PRE PROCEDURE
Edward Serna MD        heparin (porcine) injection 2,780 Units  40 Units/kg Intravenous PRN Ellie Barr MD   2,780 Units at 01/21/20 1931    0.9 % sodium chloride infusion 250 mL  250 mL Intravenous Once URBANO Tol[place] CNP        LORazepam (ATIVAN) injection 0.5 mg  0.5 mg Intravenous Once Ruthie Alexander MD        metoprolol tartrate (LOPRESSOR) tablet 50 mg  50 mg Oral BID Hermelindo Arceo DO   50 mg at 01/20/20 2043    calcium acetate (PHOSLO) capsule 1,334 mg  1,334 mg Oral TID  Moses Carbone MD   1,334 mg at 01/21/20 1644    sevelamer (RENVELA) tablet 800 mg  800 mg Oral TID  Moses Carbone MD   800 mg at 01/21/20 1644    sodium bicarbonate tablet 650 mg  650 mg Oral TID Alyssia Huffman MD   650 mg at 01/21/20 1355    calcitRIOL (ROCALTROL) capsule 0.5 mcg  0.5 mcg Oral Daily Moses Carbone MD   0.5 mcg at 01/21/20 3941    atorvastatin (LIPITOR) tablet 40 mg  40 mg Oral Nightly Alyssia Huffman MD   40 mg at 01/20/20 2043    sodium chloride flush 0.9 % injection 10 mL  10 mL Intravenous 2 times per day Hermelindo Arceo DO   10 mL at 01/21/20 0804    sodium chloride flush 0.9 % injection 10 mL  10 mL Intravenous PRN Hermelindo Arceo DO        magnesium hydroxide (MILK OF MAGNESIA) 400 MG/5ML suspension 30 mL  30 mL Oral Daily PRN Hermelindo Arceo DO        ondansetron (ZOFRAN) injection 4 mg  4 mg Intravenous Q6H PRN Hermelindo Arceo DO        acetaminophen (TYLENOL) tablet 650 mg  650 mg Oral Q6H PRN URBANO Toledo CNP   650 mg at 01/20/20 2043       Allergies:  No Known Allergies    Problem List:    Patient Active Problem List   Diagnosis Code    Acute renal failure ([facility] [address] N17.9    Elevated serum creatinine R79.89       Past Medical History:        Diagnosis Date    H. pylori infection     per patient    H/O urinary tract infection     in childhood, per patient       Past Surgical History:  History reviewed.  No pertinent surgical history. Social History:    Social History     Tobacco Use    Smoking status: Never Smoker    Smokeless tobacco: Never Used   Substance Use Topics    Alcohol use: Never     Frequency: Never                                Counseling given: Not Answered      Vital Signs (Current):   Vitals:    01/21/20 1534 01/21/20 1603 01/21/20 1703 01/21/20 1803   BP:  107/75 112/78 112/81   Pulse:  75 75 76   Resp:  25 26 23   Temp: 36.4 °C (97.5 °F)      TempSrc: Rectal      SpO2:  100% 100% 96%   Weight:       Height:                                                  BP Readings from Last 3 Encounters:   01/21/20 112/81       NPO Status:                                                                                 BMI:   Wt Readings from Last 3 Encounters:   01/21/20 147 lb 11.3 oz (67 kg)     Body mass index is 27.02 kg/m². CBC:   Lab Results   Component Value Date    WBC 4.7 01/21/2020    RBC 3.79 01/21/2020    HGB 9.0 01/21/2020    HCT 30.0 01/21/2020    MCV 79.2 01/21/2020    RDW 20.4 01/21/2020     01/21/2020       CMP:   Lab Results   Component Value Date     01/21/2020    K 4.7 01/21/2020    CL 98 01/21/2020    CO2 26 01/21/2020    BUN 23 01/21/2020    CREATININE 3.0 01/21/2020    GFRAA 28 01/21/2020    LABGLOM 23 01/21/2020    GLUCOSE 112 01/21/2020    PROT 4.6 01/21/2020    CALCIUM 7.3 01/21/2020    BILITOT 0.1 01/21/2020    ALKPHOS 41 01/21/2020    AST 14 01/21/2020    ALT 7 01/21/2020       POC Tests: No results for input(s): POCGLU, POCNA, POCK, POCCL, POCBUN, POCHEMO, POCHCT in the last 72 hours.     Coags:   Lab Results   Component Value Date    PROTIME 11.2 01/17/2020    INR 0.93 01/17/2020    APTT 53.4 01/21/2020       HCG (If Applicable): No results found for: PREGTESTUR, PREGSERUM, HCG, HCGQUANT     ABGs:   Lab Results   Component Value Date    PO2ART 77 01/19/2020    [id number] 39.0 01/19/2020    EPE4FUT 26.5 01/19/2020        Type & Screen (If Applicable):  No results found for: LABABO, [address]    Anesthesia Evaluation  Nursing notes reviewed  Airway:         Dental:          Pulmonary:Negative Pulmonary ROS                              Cardiovascular:Negative CV ROS          ECG reviewed               Beta Blocker:  Dose within 24 Hrs         Neuro/Psych:   Negative Neuro/Psych ROS              GI/Hepatic/Renal: Neg GI/Hepatic/Renal ROS  (+) renal disease: ARF,           Endo/Other: Negative Endo/Other ROS                    Abdominal:           Vascular: negative vascular ROS. EKG  Sinus tachycardia   Minimal voltage criteria for LVH, may be normal variant   ST & T wave abnormality, consider inferolateral ischemia   Abnormal ECG   When compared with ECG of 15-SONAL-2020 07:31,   Inverted T waves have replaced nonspecific T wave abnormality in Lateral leads   Confirmed by [facility] VELASCO ([id number]) on 1/18/2020 5:41:30 PM      Anesthesia Plan      MAC and TIVA     ASA 2       Induction: intravenous.                           URBANO Logan - CRNA   1/21/2020

## 2020-01-22 NOTE — PROGRESS NOTES
01/21/20  0535 01/22/20  0505   WBC 5.6 4.7 12.3*   HGB 8.6* 9.0* 7.5*    260 245     BMP:    Recent Labs     01/21/20  0535 01/21/20  1620 01/22/20  0505    134* 130*   K 4.4 4.7 4.7   CL 98* 98* 95*   CO2 24 26 23   BUN 30* 23 32*   CREATININE 4.0* 3.0* 4.3*   GLUCOSE 133* 112* 110*     Hepatic:   Recent Labs     01/21/20  0005 01/21/20  0535 01/21/20  1620   AST 17 19 14*   ALT 9* 10 7*   BILITOT 0.1 0.2 0.1   ALKPHOS 48 49 41     Troponin: No results for input(s): TROPONINI in the last 72 hours. BNP: No results for input(s): BNP in the last 72 hours. Lipids: No results for input(s): CHOL, HDL in the last 72 hours. Invalid input(s): LDLCALCU  ABGs:   Lab Results   Component Value Date    PO2ART 77 01/19/2020    [id number] 39.0 01/19/2020     INR:   No results for input(s): INR in the last 72 hours.   Renal Labs  Albumin:    Lab Results   Component Value Date    LABALBU 2.2 01/22/2020    LABALBU 40 01/15/2020     Calcium:    Lab Results   Component Value Date    CALCIUM 7.7 01/22/2020     Phosphorus:    Lab Results   Component Value Date    PHOS 5.2 01/22/2020     U/A:    Lab Results   Component Value Date    NITRU NEGATIVE 01/15/2020    COLORU YELLOW 01/15/2020    WBCUA 2 01/15/2020    RBCUA 1 01/15/2020    MUCUS RARE 01/15/2020    TRICHOMONAS NONE SEEN 01/15/2020    BACTERIA NEGATIVE 01/15/2020    CLARITYU CLEAR 01/15/2020    SPECGRAV 1.024 01/15/2020    UROBILINOGEN NORMAL 01/15/2020    BILIRUBINUR NEGATIVE 01/15/2020    BLOODU NEGATIVE 01/15/2020    KETUA SMALL 01/15/2020     ABG:    Lab Results   Component Value Date    PMM9THI 39.0 01/19/2020    PO2ART 77 01/19/2020    [id number] 26.5 01/19/2020     HgBA1c:    Lab Results   Component Value Date    LABA1C 6.1 01/15/2020     Microalbumen/Creatinine ratio:  No components found for: RUCREAT          Objective:   Vitals: BP 99/68   Pulse 83   Temp 97.7 °F (36.5 °C)   Resp 22   Ht 5' 2\" (1.575 m)   Wt 146 lb 6.2 oz (66.4 kg)   SpO2 100%   BMI

## 2020-01-22 NOTE — PROGRESS NOTES
Nutrition Assessment    Type and Reason for Visit: Initial    Nutrition Recommendations:   · Liberalize diet to help meet pt estimated needs    Nutrition Assessment: Pt assessed due to a los. He has been on a renal diet and has been consuming very little. Pt does not have a weight history in the chart. Nurse states that the pt would likely refuse an NG. Will liberalize the diet and continue to follow to assess intake. Pt may need an appetite stimulant to help meet estimated needs. Malnutrition Assessment:  · Malnutrition Status: Insufficient data(Not fitted for mask to enter pt room- pt is at least moderatly malnourished)  · Context: Acute illness or injury  · Findings of the 6 clinical characteristics of malnutrition (Minimum of 2 out of 6 clinical characteristics is required to make the diagnosis of moderate or severe Protein Calorie Malnutrition based on AND/ASPEN Guidelines):  1. Energy Intake-Less than or equal to 50% of estimated energy requirement, Greater than or equal to 7 days    2. Weight Loss-Unable to assess, unable to assess  3. Fat Loss-Unable to assess  4. Muscle Loss-Unable to assess  5. Fluid Accumulation-No significant fluid accumulation, Generalized  6.   Strength-Not measured    Nutrition Risk Level: High    Nutrient Needs:  · Estimated Daily Total Kcal: 9979-1031 based on MSJ  · Estimated Daily Protein (g): 50-60 based on 1-1.2 g/kg/IBW  · Estimated Daily Total Fluid (ml/day): 5249-9378 based on 1 mL/kcal    Nutrition Diagnosis:   · Problem: Inadequate oral intake  · Etiology: related to Acute injury/trauma, Psychological cause/life stress     Signs and symptoms:  as evidenced by Intake 0-25%    Objective Information:  · Wound Type: None  · Current Nutrition Therapies:  · Oral Diet Orders: Renal   · Oral Diet intake: Refusing to eat  · Oral Nutrition Supplement (ONS) Orders: Renal Oral Supplement  · ONS intake: Refused  · Anthropometric Measures:  · Ht: 5' 2\" (157.5 cm)   · Current

## 2020-01-22 NOTE — PROGRESS NOTES
Pulmonary and Critical Care  Progress Note      VITALS:  /73   Pulse 82   Temp 97.7 °F (36.5 °C) (Oral)   Resp 25   Ht 5' 2\" (1.575 m)   Wt 148 lb 9.4 oz (67.4 kg)   SpO2 100%   BMI 27.18 kg/m²     Subjective:   CHIEF COMPLAINT :Intermittent fevers x 1 month     HPI:                The patient is a 45 y.o. male with significant past medical history of None  presents with complaints of intermittent fever x 1 month associated with anorexia and weight loss. He is being treated with abx and his cultures have been negative,. He has been diagnosed with HIV. He is afebrile and hemodynamically stable. Objective:   PHYSICAL EXAM:    LUNGS:Occasional basal crackles  Abd-soft, BS+,NT  Ext - no pedal edema  CVS-s1s2, no murmurs      DATA:    CBC:  Recent Labs     01/20/20  0615 01/21/20  0535 01/22/20  0505   WBC 5.6 4.7 12.3*   RBC 3.63* 3.79* 3.14*   HGB 8.6* 9.0* 7.5*   HCT 29.2* 30.0* 25.5*    260 245   MCV 80.4 79.2 81.2   MCH 23.7* 23.7* 23.9*   MCHC 29.5* 30.0* 29.4*   RDW 20.1* 20.4* 21.1*   SEGSPCT 89.0* 76.0* 93.7*   BANDSPCT  --  13*  --       BMP:  Recent Labs     01/21/20  0535 01/21/20  1620 01/22/20  0505    134* 130*   K 4.4 4.7 4.7   CL 98* 98* 95*   CO2 24 26 23   BUN 30* 23 32*   CREATININE 4.0* 3.0* 4.3*   CALCIUM 7.6* 7.3* 7.7*   GLUCOSE 133* 112* 110*      ABG:  Recent Labs     01/19/20  1915   PH 7.44   PO2ART 77   [id number] 39.0   O2SAT 94.4*     BNP  No results found for: BNP   D-Dimer:  No results found for: [facility]   Radiology: None    Assessment/Plan     Patient Active Problem List    Diagnosis Date Noted    Elevated serum creatinine     Acute renal failure ([address] 01/15/2020     Patient is lying in the bed. He is getting HD and Abx per ID    Acute hypoxic resp failure  Multifocal pneumonia  HIV  Small- moderate right pleural effusion     1. Abx per ID  2. Nebs  3. NPO past midnight  4. Bronch at 12:30 PM tomorrow  5. HD per renal  6. C/w present management  7.  Hold IV Heparin at 6:30 am  No follow-ups on file.     Electronically signed by Wilber Tatum MD on 1/22/2020 at 9:17 AM

## 2020-01-22 NOTE — PROGRESS NOTES
Hospitalist Progress Note      Name:  Maribel Hugo /Age/Sex: 1975  (45 y.o. male)   MRN & CSN:  [id number] & [id number] Admission Date/Time: 1/15/2020  4:05 AM   Location:  -A PCP: No primary care provider on file. Hospital Day: 8    History of Present Illness:     Chief Complaint: Maribel Hugo is a 45 y.o.  male  who presents with intermittent fever     The patient seen and examined at bedside. He denies having any chest pain or shortness of breath. He says feeling so-so, worried about his current diagnosis. Ten point ROS reviewed negative, unless as noted above    Objective: Intake/Output Summary (Last 24 hours) at 2020 1717  Last data filed at 2020 1323  Gross per 24 hour   Intake 1540.46 ml   Output 2482 ml   Net -941.54 ml      Vitals:   Vitals:    20 1323   BP: 99/68   Pulse: 83   Resp: 22   Temp: 97.7 °F (36.5 °C)   SpO2: 100%     Physical Exam:   General Appearance: alert and oriented to person, place and time, in no acute distress  Cardiovascular: normal rate, regular rhythm, normal S1 and S2, no murmurs, rubs, clicks, or gallops, distal pulses intact, no carotid bruits, no JVD  Pulmonary/Chest: Air entry bilaterally equal.  There is occasional crackles bilaterally.   Abdomen: soft, non-tender, non-distended, normal bowel sounds, no masses   Extremities: no cyanosis, clubbing or edema, pulse   Skin: warm and dry, no rash or erythema  Head: normocephalic and atraumatic  Eyes: pupils equal, round, and reactive to light  Neck: supple and non-tender without mass, no thyromegaly   Musculoskeletal: normal range of motion, no joint swelling, deformity or tenderness  Neurological: alert, oriented, normal speech, no focal findings or movement disorder noted    Medications:   Medications:    cefepime  2 g Intravenous Once per day on     sulfamethoxazole-trimethoprim (BACTRIM) IVPB  5 mg/kg Intravenous Q24H    metroNIDAZOLE  500 mg Intravenous Q8H    pantoprazole  40 mg Intravenous Daily    midodrine  5 mg Oral BID     doxycycline hyclate  100 mg Oral 2 times per day    predniSONE  40 mg Oral BID    Followed by   Vaibhav Hancock ON 1/25/2020] predniSONE  40 mg Oral Daily    [START ON 1/30/2020] predniSONE  20 mg Oral Daily    0.9 % sodium chloride  250 mL Intravenous Once    LORazepam  0.5 mg Intravenous Once    metoprolol tartrate  50 mg Oral BID    calcium acetate  1,334 mg Oral TID     sevelamer  800 mg Oral TID     sodium bicarbonate  650 mg Oral TID    calcitRIOL  0.5 mcg Oral Daily    atorvastatin  40 mg Oral Nightly    sodium chloride flush  10 mL Intravenous 2 times per day      Infusions:    heparin (porcine) 14 Units/kg/hr (01/22/20 1408)     PRN Meds: diphenhydrAMINE, 50 mg, Nightly PRN  hydrOXYzine, 25 mg, TID PRN  sodium chloride flush, 10 mL, PRN  calcium gluconate IVPB, 1 g, PRN    Or  calcium gluconate IVPB, 2 g, PRN    Or  calcium gluconate IVPB, 3 g, PRN    Or  calcium gluconate IVPB, 4 g, PRN  heparin (porcine), 80 Units/kg, PRN  heparin (porcine), 40 Units/kg, PRN  sodium chloride flush, 10 mL, PRN  magnesium hydroxide, 30 mL, Daily PRN  ondansetron, 4 mg, Q6H PRN  acetaminophen, 650 mg, Q6H PRN            Pertinent New Labs & Imaging Studies     CBC:   Lab Results   Component Value Date    WBC 12.3 01/22/2020    RBC 3.14 01/22/2020    HGB 7.5 01/22/2020    HCT 25.5 01/22/2020    MCV 81.2 01/22/2020    MCH 23.9 01/22/2020    MCHC 29.4 01/22/2020    RDW 21.1 01/22/2020     01/22/2020    MPV 11.5 01/22/2020     BMP:    Lab Results   Component Value Date     01/22/2020    K 4.7 01/22/2020    CL 95 01/22/2020    CO2 23 01/22/2020    BUN 32 01/22/2020    LABALBU 2.2 01/22/2020    LABALBU 40 01/15/2020    CREATININE 4.3 01/22/2020    CALCIUM 7.7 01/22/2020    GFRAA 18 01/22/2020    LABGLOM 15 01/22/2020    GLUCOSE 110 01/22/2020     Ct Abdomen Pelvis Wo Contrast Additional Contrast? None    Result Date: EBL: Less than 5 cc FINDINGS: Fluoroscopic image demonstrates the tip of the catheter in the right atrium. Successful ultrasound and fluoroscopy guided non-tunneled HD catheter placement. Catheter is okay for use. Assessment and Plan:   Derrick Pepper is a 45 y.o.  male  who presents with on and off fever    Acute hypoxic respiratory failure secondary to bilateral pneumonia and pulmonary embolism-improving  Extensive airspace disease per CT chest  Patient is HIV positive  On treatment with Bactrim for possible PJP pneumonia   ID following, on prednisone, cefepime and doxycycline  For bronchoscopy with Dr Abilio Sharma  MRSA negative, vancomycin discontinued  On heparin drip for pulmonary embolism    Sepsis likely from pneumonia  Antibiotics as above  Currently PJP PCR pending   On negative suction for ruling out TB  Sputum cultures pending  Continue antibiotics as above    Pulmonary embolism  Heparin drip as above  Will change to oral    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 Hypothermia-multifactorial including sepsis as above  On Lianna joya, currently improved    Newly diagnosed HIV infection  HIV viral load, genotype pending  CD4 coun 20. Treatment as above    Hypertension-continue metoprolol    Diet DIET GENERAL;  Diet NPO, After Midnight Exceptions are: Sips with Meds   DVT Prophylaxis [] Lovenox, [x]  Heparin, [] SCDs, [] Ambulation   GI Prophylaxis [x] PPI,  [] H2 Blocker,  [] Carafate,  [] Diet/Tube Feeds   Code Status Full Code   Disposition Patient requires continued admission due to respiratory failure. Multiple barriers with discharge. Patient not sure about returning to [facility]. MDM [] Low, [] Moderate,[x]  High  Patient's risk as above due to multiple conditions including respiratory failure, kidney failure.       Electronically signed by Brigitte Abebe MD on 1/22/2020 at 5:17 PM

## 2020-01-22 NOTE — PROGRESS NOTES
Pt had 3 hours HD today removing 1.5L fluid. Tolerated well, no distress noted. Albumin given at start and during treatment for blood pressure support. Retacrit given for anemia. Right temp HD catheter ran well, both ports heparin locked and capped. Denies needs.  Report to Rigo Laguna

## 2020-01-23 ENCOUNTER — ANESTHESIA (OUTPATIENT)
Dept: ENDOSCOPY | Age: 45
DRG: 890 | End: 2020-01-23
Payer: MEDICAID

## 2020-01-23 ENCOUNTER — ANESTHESIA EVENT (OUTPATIENT)
Dept: ENDOSCOPY | Age: 45
DRG: 890 | End: 2020-01-23
Payer: MEDICAID

## 2020-01-23 VITALS — DIASTOLIC BLOOD PRESSURE: 65 MMHG | SYSTOLIC BLOOD PRESSURE: 94 MMHG | OXYGEN SATURATION: 100 %

## 2020-01-23 LAB
ACANTHOCYTES: ABNORMAL
ALBUMIN SERPL-MCNC: 2.5 GM/DL (ref 3.4–5)
ANION GAP SERPL CALCULATED.3IONS-SCNC: 14 MMOL/L (ref 4–16)
APTT: 109.6 SECONDS (ref 25.1–37.1)
APTT: 29.9 SECONDS (ref 25.1–37.1)
APTT: 63.1 SECONDS (ref 25.1–37.1)
APTT: 77.1 SECONDS (ref 25.1–37.1)
BASOPHILS ABSOLUTE: 0 K/CU MM
BASOPHILS RELATIVE PERCENT: 0.1 % (ref 0–1)
BUN BLDV-MCNC: 28 MG/DL (ref 6–23)
CALCIUM IONIZED: 4.08 MG/DL (ref 4.48–5.28)
CALCIUM SERPL-MCNC: 7.6 MG/DL (ref 8.3–10.6)
CHLORIDE BLD-SCNC: 94 MMOL/L (ref 99–110)
CO2: 25 MMOL/L (ref 21–32)
CREAT SERPL-MCNC: 4.2 MG/DL (ref 0.9–1.3)
DIFFERENTIAL TYPE: ABNORMAL
EER HIV GENOTYPING: NORMAL
EER HIV GENOTYPING: NORMAL
EOSINOPHILS ABSOLUTE: 0 K/CU MM
EOSINOPHILS RELATIVE PERCENT: 0 % (ref 0–3)
GFR AFRICAN AMERICAN: 19 ML/MIN/1.73M2
GFR NON-AFRICAN AMERICAN: 16 ML/MIN/1.73M2
GIANT PLATELETS: PRESENT
GLUCOSE BLD-MCNC: 116 MG/DL (ref 70–99)
HCT VFR BLD CALC: 22.4 % (ref 42–52)
HCT VFR BLD CALC: 25.5 % (ref 42–52)
HCT VFR BLD CALC: 25.6 % (ref 42–52)
HEMOGLOBIN: 7.7 GM/DL (ref 13.5–18)
HEMOGLOBIN: 7.9 GM/DL (ref 13.5–18)
HEMOGLOBIN: ABNORMAL GM/DL (ref 13.5–18)
HIGH SENSITIVE C-REACTIVE PROTEIN: 19.9 MG/L
HIV-1 COPIES/ML: ABNORMAL
HIV-1 GENOTYPE: NORMAL
HIV-1 GENOTYPE: NORMAL
HYPOCHROMIA: ABNORMAL
IMMATURE NEUTROPHIL %: 1 % (ref 0–0.43)
INTERPRETATION: ABNORMAL
INTERPRETATION: DETECTED
IONIZED CA: 1.02 MMOL/L (ref 1.12–1.32)
LOG COPIES/ML (ULTRA): 6.23 LOG CPY/ML
LYMPHOCYTES ABSOLUTE: 0.5 K/CU MM
LYMPHOCYTES RELATIVE PERCENT: 3.7 % (ref 24–44)
MCH RBC QN AUTO: 24 PG (ref 27–31)
MCHC RBC AUTO-ENTMCNC: 29.5 % (ref 32–36)
MCV RBC AUTO: 81.5 FL (ref 78–100)
MICROCYTES: ABNORMAL
MONOCYTES ABSOLUTE: 0.1 K/CU MM
MONOCYTES RELATIVE PERCENT: 1.1 % (ref 0–4)
OVALOCYTES: ABNORMAL
PDW BLD-RTO: 20.9 % (ref 11.7–14.9)
PHOSPHORUS: 5.1 MG/DL (ref 2.5–4.9)
PLATELET # BLD: 217 K/CU MM (ref 140–440)
PMV BLD AUTO: 12.2 FL (ref 7.5–11.1)
POTASSIUM SERPL-SCNC: 4.7 MMOL/L (ref 3.5–5.1)
PROCALCITONIN: 7.48
RBC # BLD: 2.75 M/CU MM (ref 4.6–6.2)
SEGMENTED NEUTROPHILS ABSOLUTE COUNT: 11.9 K/CU MM
SEGMENTED NEUTROPHILS RELATIVE PERCENT: 94.1 % (ref 36–66)
SODIUM BLD-SCNC: 133 MMOL/L (ref 135–145)
TB CELL IMMUNE MEASURE: NORMAL
TOTAL IMMATURE NEUTOROPHIL: 0.13 K/CU MM
WBC # BLD: 12.6 K/CU MM (ref 4–10.5)

## 2020-01-23 PROCEDURE — 88313 SPECIAL STAINS GROUP 2: CPT

## 2020-01-23 PROCEDURE — 86922 COMPATIBILITY TEST ANTIGLOB: CPT

## 2020-01-23 PROCEDURE — 31624 DX BRONCHOSCOPE/LAVAGE: CPT

## 2020-01-23 PROCEDURE — 88305 TISSUE EXAM BY PATHOLOGIST: CPT

## 2020-01-23 PROCEDURE — 2000000000 HC ICU R&B

## 2020-01-23 PROCEDURE — 85018 HEMOGLOBIN: CPT

## 2020-01-23 PROCEDURE — 2500000003 HC RX 250 WO HCPCS: Performed by: INTERNAL MEDICINE

## 2020-01-23 PROCEDURE — 88112 CYTOPATH CELL ENHANCE TECH: CPT

## 2020-01-23 PROCEDURE — 3700000001 HC ADD 15 MINUTES (ANESTHESIA): Performed by: INTERNAL MEDICINE

## 2020-01-23 PROCEDURE — 2580000003 HC RX 258: Performed by: INTERNAL MEDICINE

## 2020-01-23 PROCEDURE — 6360000002 HC RX W HCPCS: Performed by: INTERNAL MEDICINE

## 2020-01-23 PROCEDURE — 80069 RENAL FUNCTION PANEL: CPT

## 2020-01-23 PROCEDURE — 88312 SPECIAL STAINS GROUP 1: CPT

## 2020-01-23 PROCEDURE — 87798 DETECT AGENT NOS DNA AMP: CPT

## 2020-01-23 PROCEDURE — 2500000003 HC RX 250 WO HCPCS: Performed by: NURSE ANESTHETIST, CERTIFIED REGISTERED

## 2020-01-23 PROCEDURE — 86901 BLOOD TYPING SEROLOGIC RH(D): CPT

## 2020-01-23 PROCEDURE — 99233 SBSQ HOSP IP/OBS HIGH 50: CPT | Performed by: INTERNAL MEDICINE

## 2020-01-23 PROCEDURE — 31622 DX BRONCHOSCOPE/WASH: CPT | Performed by: INTERNAL MEDICINE

## 2020-01-23 PROCEDURE — 6370000000 HC RX 637 (ALT 250 FOR IP): Performed by: INTERNAL MEDICINE

## 2020-01-23 PROCEDURE — 6360000002 HC RX W HCPCS

## 2020-01-23 PROCEDURE — 2580000003 HC RX 258: Performed by: PHYSICIAN ASSISTANT

## 2020-01-23 PROCEDURE — 6370000000 HC RX 637 (ALT 250 FOR IP): Performed by: STUDENT IN AN ORGANIZED HEALTH CARE EDUCATION/TRAINING PROGRAM

## 2020-01-23 PROCEDURE — 82330 ASSAY OF CALCIUM: CPT

## 2020-01-23 PROCEDURE — 2580000003 HC RX 258: Performed by: STUDENT IN AN ORGANIZED HEALTH CARE EDUCATION/TRAINING PROGRAM

## 2020-01-23 PROCEDURE — 2580000003 HC RX 258: Performed by: NURSE ANESTHETIST, CERTIFIED REGISTERED

## 2020-01-23 PROCEDURE — 99232 SBSQ HOSP IP/OBS MODERATE 35: CPT | Performed by: INTERNAL MEDICINE

## 2020-01-23 PROCEDURE — 36592 COLLECT BLOOD FROM PICC: CPT

## 2020-01-23 PROCEDURE — 87103 BLOOD FUNGUS CULTURE: CPT

## 2020-01-23 PROCEDURE — 84145 PROCALCITONIN (PCT): CPT

## 2020-01-23 PROCEDURE — 86850 RBC ANTIBODY SCREEN: CPT

## 2020-01-23 PROCEDURE — 86481 TB AG RESPONSE T-CELL SUSP: CPT

## 2020-01-23 PROCEDURE — P9016 RBC LEUKOCYTES REDUCED: HCPCS

## 2020-01-23 PROCEDURE — 86141 C-REACTIVE PROTEIN HS: CPT

## 2020-01-23 PROCEDURE — 31622 DX BRONCHOSCOPE/WASH: CPT

## 2020-01-23 PROCEDURE — C9113 INJ PANTOPRAZOLE SODIUM, VIA: HCPCS | Performed by: INTERNAL MEDICINE

## 2020-01-23 PROCEDURE — 85007 BL SMEAR W/DIFF WBC COUNT: CPT

## 2020-01-23 PROCEDURE — 87517 HEPATITIS B DNA QUANT: CPT

## 2020-01-23 PROCEDURE — 85027 COMPLETE CBC AUTOMATED: CPT

## 2020-01-23 PROCEDURE — 3700000000 HC ANESTHESIA ATTENDED CARE: Performed by: INTERNAL MEDICINE

## 2020-01-23 PROCEDURE — 6360000002 HC RX W HCPCS: Performed by: NURSE ANESTHETIST, CERTIFIED REGISTERED

## 2020-01-23 PROCEDURE — 86900 BLOOD TYPING SEROLOGIC ABO: CPT

## 2020-01-23 PROCEDURE — 7100000001 HC PACU RECOVERY - ADDTL 15 MIN

## 2020-01-23 PROCEDURE — 36430 TRANSFUSION BLD/BLD COMPNT: CPT

## 2020-01-23 PROCEDURE — 87102 FUNGUS ISOLATION CULTURE: CPT

## 2020-01-23 PROCEDURE — 0B9M8ZX DRAINAGE OF BILATERAL LUNGS, VIA NATURAL OR ARTIFICIAL OPENING ENDOSCOPIC, DIAGNOSTIC: ICD-10-PCS | Performed by: INTERNAL MEDICINE

## 2020-01-23 PROCEDURE — 87116 MYCOBACTERIA CULTURE: CPT

## 2020-01-23 PROCEDURE — 7100000000 HC PACU RECOVERY - FIRST 15 MIN

## 2020-01-23 PROCEDURE — 88108 CYTOPATH CONCENTRATE TECH: CPT

## 2020-01-23 PROCEDURE — 87205 SMEAR GRAM STAIN: CPT

## 2020-01-23 PROCEDURE — 31645 BRNCHSC W/THER ASPIR 1ST: CPT

## 2020-01-23 PROCEDURE — 85014 HEMATOCRIT: CPT

## 2020-01-23 PROCEDURE — 87070 CULTURE OTHR SPECIMN AEROBIC: CPT

## 2020-01-23 PROCEDURE — 2709999900 HC NON-CHARGEABLE SUPPLY: Performed by: INTERNAL MEDICINE

## 2020-01-23 PROCEDURE — 90935 HEMODIALYSIS ONE EVALUATION: CPT

## 2020-01-23 PROCEDURE — 6360000002 HC RX W HCPCS: Performed by: STUDENT IN AN ORGANIZED HEALTH CARE EDUCATION/TRAINING PROGRAM

## 2020-01-23 PROCEDURE — 85730 THROMBOPLASTIN TIME PARTIAL: CPT

## 2020-01-23 RX ORDER — FUROSEMIDE 10 MG/ML
60 INJECTION INTRAMUSCULAR; INTRAVENOUS ONCE
Status: DISCONTINUED | OUTPATIENT
Start: 2020-01-23 | End: 2020-01-23

## 2020-01-23 RX ORDER — 0.9 % SODIUM CHLORIDE 0.9 %
250 INTRAVENOUS SOLUTION INTRAVENOUS ONCE
Status: COMPLETED | OUTPATIENT
Start: 2020-01-23 | End: 2020-01-23

## 2020-01-23 RX ORDER — LIDOCAINE HYDROCHLORIDE 20 MG/ML
INJECTION, SOLUTION INFILTRATION; PERINEURAL PRN
Status: DISCONTINUED | OUTPATIENT
Start: 2020-01-23 | End: 2020-01-23 | Stop reason: SDUPTHER

## 2020-01-23 RX ORDER — LIDOCAINE HYDROCHLORIDE 10 MG/ML
INJECTION, SOLUTION EPIDURAL; INFILTRATION; INTRACAUDAL; PERINEURAL PRN
Status: DISCONTINUED | OUTPATIENT
Start: 2020-01-23 | End: 2020-01-23 | Stop reason: ALTCHOICE

## 2020-01-23 RX ORDER — PROPOFOL 10 MG/ML
INJECTION, EMULSION INTRAVENOUS PRN
Status: DISCONTINUED | OUTPATIENT
Start: 2020-01-23 | End: 2020-01-23 | Stop reason: SDUPTHER

## 2020-01-23 RX ORDER — SODIUM CHLORIDE, SODIUM LACTATE, POTASSIUM CHLORIDE, CALCIUM CHLORIDE 600; 310; 30; 20 MG/100ML; MG/100ML; MG/100ML; MG/100ML
INJECTION, SOLUTION INTRAVENOUS CONTINUOUS PRN
Status: DISCONTINUED | OUTPATIENT
Start: 2020-01-23 | End: 2020-01-23 | Stop reason: SDUPTHER

## 2020-01-23 RX ORDER — HEPARIN SODIUM 1000 [USP'U]/ML
2600 INJECTION, SOLUTION INTRAVENOUS; SUBCUTANEOUS
Status: COMPLETED | OUTPATIENT
Start: 2020-01-23 | End: 2020-01-23

## 2020-01-23 RX ORDER — MIDODRINE HYDROCHLORIDE 5 MG/1
10 TABLET ORAL
Status: DISCONTINUED | OUTPATIENT
Start: 2020-01-23 | End: 2020-02-06 | Stop reason: HOSPADM

## 2020-01-23 RX ORDER — MIDAZOLAM HYDROCHLORIDE 1 MG/ML
INJECTION INTRAMUSCULAR; INTRAVENOUS PRN
Status: DISCONTINUED | OUTPATIENT
Start: 2020-01-23 | End: 2020-01-23 | Stop reason: SDUPTHER

## 2020-01-23 RX ADMIN — PROPOFOL 30 MG: 10 INJECTION, EMULSION INTRAVENOUS at 13:16

## 2020-01-23 RX ADMIN — DIPHENHYDRAMINE HYDROCHLORIDE 50 MG: 25 TABLET ORAL at 21:25

## 2020-01-23 RX ADMIN — CALCIUM ACETATE 1334 MG: 667 CAPSULE ORAL at 17:42

## 2020-01-23 RX ADMIN — SODIUM CHLORIDE, PRESERVATIVE FREE 10 ML: 5 INJECTION INTRAVENOUS at 08:30

## 2020-01-23 RX ADMIN — HEPARIN SODIUM 2600 UNITS: 1000 INJECTION, SOLUTION INTRAVENOUS; SUBCUTANEOUS at 11:45

## 2020-01-23 RX ADMIN — SODIUM CHLORIDE, POTASSIUM CHLORIDE, SODIUM LACTATE AND CALCIUM CHLORIDE: 600; 310; 30; 20 INJECTION, SOLUTION INTRAVENOUS at 13:06

## 2020-01-23 RX ADMIN — MIDAZOLAM 2 MG: 1 INJECTION INTRAMUSCULAR; INTRAVENOUS at 13:06

## 2020-01-23 RX ADMIN — MIDODRINE HYDROCHLORIDE 10 MG: 5 TABLET ORAL at 17:42

## 2020-01-23 RX ADMIN — SULFAMETHOXAZOLE AND TRIMETHOPRIM 337.6 MG: 80; 16 INJECTION, SOLUTION, CONCENTRATE INTRAVENOUS at 17:41

## 2020-01-23 RX ADMIN — METRONIDAZOLE 500 MG: 500 INJECTION, SOLUTION INTRAVENOUS at 04:30

## 2020-01-23 RX ADMIN — HYDROXYZINE PAMOATE 25 MG: 25 CAPSULE ORAL at 04:55

## 2020-01-23 RX ADMIN — SEVELAMER CARBONATE 800 MG: 800 TABLET, FILM COATED ORAL at 17:42

## 2020-01-23 RX ADMIN — PROPOFOL 20 MG: 10 INJECTION, EMULSION INTRAVENOUS at 13:17

## 2020-01-23 RX ADMIN — ATORVASTATIN CALCIUM 40 MG: 40 TABLET, FILM COATED ORAL at 21:25

## 2020-01-23 RX ADMIN — ONDANSETRON HYDROCHLORIDE 4 MG: 2 INJECTION, SOLUTION INTRAMUSCULAR; INTRAVENOUS at 04:29

## 2020-01-23 RX ADMIN — FUROSEMIDE 60 MG: 40 TABLET ORAL at 21:25

## 2020-01-23 RX ADMIN — PANTOPRAZOLE SODIUM 40 MG: 40 INJECTION, POWDER, FOR SOLUTION INTRAVENOUS at 14:25

## 2020-01-23 RX ADMIN — PROPOFOL 30 MG: 10 INJECTION, EMULSION INTRAVENOUS at 13:19

## 2020-01-23 RX ADMIN — PROPOFOL 20 MG: 10 INJECTION, EMULSION INTRAVENOUS at 13:14

## 2020-01-23 RX ADMIN — DOXYCYCLINE HYCLATE 100 MG: 100 TABLET, COATED ORAL at 21:25

## 2020-01-23 RX ADMIN — SODIUM CHLORIDE 250 ML: 9 INJECTION, SOLUTION INTRAVENOUS at 08:27

## 2020-01-23 RX ADMIN — SODIUM CHLORIDE, PRESERVATIVE FREE 10 ML: 5 INJECTION INTRAVENOUS at 21:26

## 2020-01-23 RX ADMIN — SODIUM CHLORIDE, PRESERVATIVE FREE 10 ML: 5 INJECTION INTRAVENOUS at 04:30

## 2020-01-23 RX ADMIN — METRONIDAZOLE 500 MG: 500 INJECTION, SOLUTION INTRAVENOUS at 14:25

## 2020-01-23 RX ADMIN — ONDANSETRON HYDROCHLORIDE 4 MG: 2 INJECTION, SOLUTION INTRAMUSCULAR; INTRAVENOUS at 21:25

## 2020-01-23 RX ADMIN — PREDNISONE 40 MG: 20 TABLET ORAL at 21:25

## 2020-01-23 RX ADMIN — METRONIDAZOLE 500 MG: 500 INJECTION, SOLUTION INTRAVENOUS at 21:26

## 2020-01-23 RX ADMIN — PROPOFOL 50 MG: 10 INJECTION, EMULSION INTRAVENOUS at 13:13

## 2020-01-23 RX ADMIN — LIDOCAINE HYDROCHLORIDE 50 MG: 20 INJECTION, SOLUTION INFILTRATION; PERINEURAL at 13:13

## 2020-01-23 ASSESSMENT — PULMONARY FUNCTION TESTS
PIF_VALUE: 0
PIF_VALUE: 1
PIF_VALUE: 0

## 2020-01-23 ASSESSMENT — PAIN SCALES - GENERAL
PAINLEVEL_OUTOF10: 0

## 2020-01-23 NOTE — PROGRESS NOTES
Labs     01/21/20  0535 01/22/20  0505 01/23/20  0445   WBC 4.7 12.3* 12.6*   HGB 9.0* 7.5* 6.6  CALLED TO MACKENZIE RICKS RN AT 7429, [facility] MLS  RESULTS READ BACK  *    245 217     BMP:    Recent Labs     01/21/20  1620 01/22/20  0505 01/23/20  0445   * 130* 133*   K 4.7 4.7 4.7   CL 98* 95* 94*   CO2 26 23 25   BUN 23 32* 28*   CREATININE 3.0* 4.3* 4.2*   GLUCOSE 112* 110* 116*     Hepatic:   Recent Labs     01/21/20  0005 01/21/20  0535 01/21/20  1620   AST 17 19 14*   ALT 9* 10 7*   BILITOT 0.1 0.2 0.1   ALKPHOS 48 49 41     Troponin: No results for input(s): TROPONINI in the last 72 hours. BNP: No results for input(s): BNP in the last 72 hours. Lipids: No results for input(s): CHOL, HDL in the last 72 hours. Invalid input(s): LDLCALCU  ABGs:   Lab Results   Component Value Date    PO2ART 77 01/19/2020    WAZ0GZE 39.0 01/19/2020     INR:   No results for input(s): INR in the last 72 hours.   Renal Labs  Albumin:    Lab Results   Component Value Date    LABALBU 2.5 01/23/2020    LABALBU 40 01/15/2020     Calcium:    Lab Results   Component Value Date    CALCIUM 7.6 01/23/2020     Phosphorus:    Lab Results   Component Value Date    PHOS 5.1 01/23/2020     U/A:    Lab Results   Component Value Date    NITRU NEGATIVE 01/15/2020    COLORU YELLOW 01/15/2020    WBCUA 2 01/15/2020    RBCUA 1 01/15/2020    MUCUS RARE 01/15/2020    TRICHOMONAS NONE SEEN 01/15/2020    BACTERIA NEGATIVE 01/15/2020    CLARITYU CLEAR 01/15/2020    SPECGRAV 1.024 01/15/2020    UROBILINOGEN NORMAL 01/15/2020    BILIRUBINUR NEGATIVE 01/15/2020    BLOODU NEGATIVE 01/15/2020    KETUA SMALL 01/15/2020     ABG:    Lab Results   Component Value Date    BLM2ENL 39.0 01/19/2020    PO2ART 77 01/19/2020    POO1CFL 26.5 01/19/2020     HgBA1c:    Lab Results   Component Value Date    LABA1C 6.1 01/15/2020     Microalbumen/Creatinine ratio:  No components found for: RUCREAT          Objective:   Vitals: BP 99/73   Pulse 77   Temp 97.7 °F (36.5 °C)   Resp 23   Ht 5' 2\" (1.575 m)   Wt 150 lb 2.1 oz (68.1 kg)   SpO2 100%   BMI 27.46 kg/m²   General appearance: awake weak  HEENT: Head: Normal, normocephalic, atraumatic.   Neck: supple, symmetrical, trachea midline  Lungs: diminished breath sounds bilaterally  Heart: S1, S2 normal  Abdomen: abnormal findings:  soft nt  Extremities: edema trace hd catheter  Neurologic: Mental status: alertness: awake anxious      Patient Active Problem List:     Acute renal failure (HCC)    Assessment and Plan:      IMP:  esrd from hivan  Hyperkalemia  Nephrotic syndrome/HIVAN  PE with sob  Anemia  hiv +  Hep B prior infection  hypotension      Plan     1 short hD today and plan hd in am. Need isolation room for dialysis and current room used in [facility] and will refer to [facility] for outpt dialysis when dc and I will follow him there unless plan return to [place] and cousin to come visit this weekend  2 K stable with hd  3 fu treatment with ID and poor uop, add diuretic  4 on anticoagulation and fu bronch and work up TB  5 prbc as need 1 unit ptday  6 dialysis isolation and fu therapy hiv  7 bp low stable  Will follow and multiple illness need therapy and work up for [employer] pending and will likely need rehab on dc as well             Ray Taylor MD

## 2020-01-23 NOTE — PROGRESS NOTES
sulfamethoxazole-trimethoprim (BACTRIM) IVPB  5 mg/kg Intravenous Q24H    metroNIDAZOLE  500 mg Intravenous Q8H    pantoprazole  40 mg Intravenous Daily    doxycycline hyclate  100 mg Oral 2 times per day    predniSONE  40 mg Oral BID    Followed by   Candice Castellanos ON 1/25/2020] predniSONE  40 mg Oral Daily    [START ON 1/30/2020] predniSONE  20 mg Oral Daily    0.9 % sodium chloride  250 mL Intravenous Once    LORazepam  0.5 mg Intravenous Once    metoprolol tartrate  50 mg Oral BID    calcium acetate  1,334 mg Oral TID WC    sevelamer  800 mg Oral TID WC    calcitRIOL  0.5 mcg Oral Daily    atorvastatin  40 mg Oral Nightly    sodium chloride flush  10 mL Intravenous 2 times per day      Infusions:    [Held by provider] heparin (porcine) Stopped (01/23/20 0518)     PRN Meds: diphenhydrAMINE, 50 mg, Nightly PRN  hydrOXYzine, 25 mg, TID PRN  sodium chloride flush, 10 mL, PRN  calcium gluconate IVPB, 1 g, PRN    Or  calcium gluconate IVPB, 2 g, PRN    Or  calcium gluconate IVPB, 3 g, PRN    Or  calcium gluconate IVPB, 4 g, PRN  [Held by provider] heparin (porcine), 80 Units/kg, PRN  [Held by provider] heparin (porcine), 40 Units/kg, PRN  sodium chloride flush, 10 mL, PRN  magnesium hydroxide, 30 mL, Daily PRN  ondansetron, 4 mg, Q6H PRN  acetaminophen, 650 mg, Q6H PRN            Pertinent New Labs & Imaging Studies     CBC:   Lab Results   Component Value Date    WBC 12.6 01/23/2020    RBC 2.75 01/23/2020    HGB  01/23/2020     6.6  CALLED TO MACKENZIE RICKS RN AT 0504, [facility] MLS  RESULTS READ BACK      HCT 22.4 01/23/2020    MCV 81.5 01/23/2020    MCH 24.0 01/23/2020    MCHC 29.5 01/23/2020    RDW 20.9 01/23/2020     01/23/2020    MPV 12.2 01/23/2020     BMP:    Lab Results   Component Value Date     01/23/2020    K 4.7 01/23/2020    CL 94 01/23/2020    CO2 25 01/23/2020    BUN 28 01/23/2020    LABALBU 2.5 01/23/2020    LABALBU 40 01/15/2020    CREATININE 4.2 01/23/2020    CALCIUM 7.6 01/23/2020 acute osseous abnormality. Mild anasarca. 1. Duodenal wall thickening with surrounding stranding concerning for duodenitis. No evidence of perforation. 2. Punctate nonobstructing left renal calculus. No acute obstructive uropathy. 3. Small amount of free fluid in the pelvic cavity is favored reactive. 4. Mildly enlarged retroperitoneal lymph nodes may reflect reactive adenopathy, lymphoproliferative disorder, or metastatic disease. 5. Mild edema in the right lower lobe with trace layering right pleural effusion. 6. Mild anasarca. Xr Chest Standard (2 Vw)    Result Date: 1/15/2020  EXAMINATION: TWO XRAY VIEWS OF THE CHEST 1/15/2020 5:42 am COMPARISON: None. HISTORY: ORDERING SYSTEM PROVIDED HISTORY: Chest pain TECHNOLOGIST PROVIDED HISTORY: Reason for exam:->Chest pain Reason for Exam: chest pain Acuity: Acute Type of Exam: Initial FINDINGS: The cardiac silhouette is within normal limits for size. There is no focal consolidation, pleural effusion or pneumothorax. The visualized osseous structures demonstrate no acute abnormality. No acute cardiopulmonary abnormality. Xr Chest Portable    Result Date: 1/16/2020  EXAMINATION: ONE XRAY VIEW OF THE CHEST 1/15/2020 7:55 am COMPARISON: 01/15/2020. HISTORY: ORDERING SYSTEM PROVIDED HISTORY: SOB TECHNOLOGIST PROVIDED HISTORY: Reason for exam:->SOB Reason for Exam: SOB Acuity: Acute Type of Exam: Subsequent/Follow-up FINDINGS: Interval placement of a right jugular central venous catheter with the tip at the level of the SVC/right atrial junction. No evidence of a pneumothorax. The cardiac silhouette appears within normal limits for size given portable technique. There is no focal consolidation. No pleural effusion or pneumothorax. 1. Right jugular central venous catheter with the tip at the level of the SVC/right atrial junction. 2. No pneumothorax. 3. No focal consolidation.      Us Retroperitoneal Complete    Result Date: 1/15/2020  EXAMINATION: RETROPERITONEAL ULTRASOUND OF THE KIDNEYS AND URINARY BLADDER 1/15/2020 COMPARISON: None HISTORY: ORDERING SYSTEM PROVIDED HISTORY: DWIGHT; please characterize kidneys and screen for obstruction TECHNOLOGIST PROVIDED HISTORY: Reason for exam:->DWIGHT; please characterize kidneys and screen for obstruction Reason for Exam: DWIGHT Acuity: Acute Type of Exam: Initial Additional signs and symptoms: nk Relevant Medical/Surgical History: nk FINDINGS: Kidneys: The right kidney measures 13 cm in length and the left kidney measures 13 cm in length. The kidneys are hyperechoic. There is no hydronephrosis or shadowing stone. Bladder: The urinary bladder is normal and incompletely distended. No hydronephrosis. Ir Nontunneled Vascular Catheter > 5 Years    Result Date: 1/15/2020  PROCEDURE: ULTRASOUND GUIDED VASCULAR ACCESS. FLUOROSCOPY GUIDED PLACEMENT OF A NON-TUNNELED CATHETER. 1/15/2020. HISTORY: ORDERING SYSTEM PROVIDED HISTORY: Renal insufficiency TECHNOLOGIST PROVIDED HISTORY: Reason for exam:->temp cath Acute renal failure, needs dialysis SEDATION: None FLUOROSCOPY DOSE AND TYPE OR TIME AND EXPOSURES: 0.5 minutes, air kerma 8 TECHNIQUE: Informed consent was obtained after a detailed explanation of the procedure including risks, benefits, and alternatives. Universal protocol was observed. The right neck and chest were prepped and draped in sterile fashion using maximum sterile barrier technique. Local anesthesia was achieved with lidocaine. A micropuncture needle was used to access the right internal jugular vein using ultrasound guidance. An ultrasound image demonstrating patency of the vein with needle tip located within it. An image was obtained and stored in PACs. A 0.035 guidewire was used to place a none tunneled triple-lumen HD catheter using fluoroscopic guidance with tip in the right atrium after fascial tract dilation. The catheter flushed easily and there was a good blood return.  The catheter was sutured to

## 2020-01-23 NOTE — PROGRESS NOTES
Pulmonary and Critical Care  Progress Note      VITALS:  /79   Pulse 85   Temp 97.7 °F (36.5 °C) (Oral)   Resp 19   Ht 5' 2\" (1.575 m)   Wt 151 lb 7.3 oz (68.7 kg)   SpO2 100%   BMI 27.70 kg/m²     Subjective:   CHIEF COMPLAINT :Intermittent fevers x 1 month     HPI:                The patient is a 45 y.o. male with significant past medical history of None  presents with complaints of intermittent fever x 1 month associated with anorexia and weight loss. He is being treated with abx and his cultures have been negative,. He has been diagnosed with HIV. He is afebrile and hemodynamically stable. Objective:   PHYSICAL EXAM:    LUNGS:Occasional basal crackles  Abd-soft,BS+, NT  Ext- no pedal edema  CVS-s1s2, no murmurs      DATA:    CBC:  Recent Labs     01/21/20  0535 01/22/20  0505 01/23/20  0445   WBC 4.7 12.3* 12.6*   RBC 3.79* 3.14* 2.75*   HGB 9.0* 7.5* 6.6  CALLED TO MACKENZIE RICKS RN AT 0504, [facility] MLS  RESULTS READ BACK  *   HCT 30.0* 25.5* 22.4*    245 217   MCV 79.2 81.2 81.5   MCH 23.7* 23.9* 24.0*   MCHC 30.0* 29.4* 29.5*   RDW 20.4* 21.1* 20.9*   SEGSPCT 76.0* 93.7* 94.1*   BANDSPCT 13*  --   --       BMP:  Recent Labs     01/21/20  1620 01/22/20  0505 01/23/20  0445   * 130* 133*   K 4.7 4.7 4.7   CL 98* 95* 94*   CO2 26 23 25   BUN 23 32* 28*   CREATININE 3.0* 4.3* 4.2*   CALCIUM 7.3* 7.7* 7.6*   GLUCOSE 112* 110* 116*      ABG:  No results for input(s): PH, PO2ART, TSH0XFJ, HCO3, BEART, O2SAT in the last 72 hours. BNP  No results found for: BNP   D-Dimer:  No results found for: DDIMER   1. Radiology: reviewed      Assessment/Plan     Patient Active Problem List    Diagnosis Date Noted    Elevated serum creatinine     Acute renal failure ([facility] [address] 01/15/2020     Patient is lying in the bed. He is not in acute resp distress. Acute hypoxic resp failure  Multifocal pneumonia  HIV  Small- moderate right pleural effusion     1. Abx per ID  2.  Transfuse 1 unit PRBC  3. Stool for occult blood  4. F/u H&H  5. NPO  6. Bronch today  7. HD per renal  8. C/w present management  No follow-ups on file.     Electronically signed by Willis Rincon MD on 1/23/2020 at 11:50 AM

## 2020-01-23 NOTE — PROGRESS NOTES
Pt had 2 hours HD today with fluid removal of 500 ml per order. Tolerated well, no distress noted. 1 unit PRBCs given during HD, no adverse effects. Right temp HD catheter ran well, both ports heparin locked and capped per policy. Denies needs. Report to [place], RN.

## 2020-01-23 NOTE — PROGRESS NOTES
Infectious Disease Progress Note  2020   Patient Name: Ryan Yancey. : 1975   Impression  · AIDS-advanced HIV  ? CD4 count 20, HIV viral load: 1.7 million  · Bilateral pneumonia with hypoxic respiratory failure  ? Afebrile, oxygen requirements are not increasing. ? Etiology of pneumonia remains unknown  ? Procalcitonin continues to rise: ? Mycobaterium  ? Fungitell negative, PJP uncertain, ? Toxoplasma pneumonitis  ? MRSA nares negative, Fungitell negative, toxoplasma IgG positive  ? Histoplasma antigen, Blastomyces antigen, cryptococcus antigen and negative  · Pulmonary Embolism- right pulmonary artery  ? Remains on heparin drip  · Acute kidney injury with ATN  ? Required CRRT  ? S/p left renal biopsy with CT showing pararenal fluid likely blood/hematoma  · Past hepatitis B infection  ? Hepatitis B surface antibody positive, anti-HB C antibody positive, hepatitis B surface antigen negative  ? Hepatitis C antibody negative  · Multi-morbidity: per PMHx  Plan:  · Continue cefepime, doxycycline, metronidazole, Bactrim and prednisone  · Plan for BAL on 2020 is noted. · BAL for PJP DFA, PJP PCR, toxoplasma PCR, AFB, fungal, bacterial culture  · Follow-up HIV genotype      Ongoing Antimicrobial Therapy  Cefepime   Doxycycline   Metronidazole   Bactrim   Completed Antimicrobial Therapy  Vancomycin -  ? History:? Interval history noted, bilateral pneumonia  Continues to cough but its non-productive  Physical Exam:  Vital Signs: BP 94/71   Pulse 67   Temp 97.7 °F (36.5 °C) (Oral)   Resp 14   Ht 5' 2\" (1.575 m)   Wt 151 lb 7.3 oz (68.7 kg)   SpO2 100%   BMI 27.70 kg/m²     Gen: alert and oriented X3, no distress  Skin: no stigmata of endocarditis  Wounds: C/D/I  HEMT: AT/NC Oropharynx pink, moist, and without lesions or exudates; dentition in good state of repair  Eyes: PERRLA, EOMI, conjunctiva pink, sclera anicteric. Neck: Supple. Trachea midline. No LAD.   Chest: no distress and CTA. Good air movement. Heart: RRR and no MRG. Abd: soft, non-distended, no tenderness, no hepatomegaly. Normoactive bowel sounds. Ext: no clubbing, cyanosis, or edema  Catheter Site: without erythema or tenderness  Neuro: Mental status intact. CN 2-12 intact and no focal sensory or motor deficits     Radiologic / Imaging / TESTING  No results found.      Labs:    Recent Results (from the past 24 hour(s))   APTT    Collection Time: 01/22/20  2:15 PM   Result Value Ref Range    aPTT 34.6 25.1 - 37.1 SECONDS   APTT    Collection Time: 01/22/20  5:50 PM   Result Value Ref Range    aPTT 63.3 (H) 25.1 - 37.1 SECONDS   APTT    Collection Time: 01/23/20 12:00 AM   Result Value Ref Range    aPTT 77.1 (H) 25.1 - 37.1 SECONDS   Calcium, ionized    Collection Time: 01/23/20 12:00 AM   Result Value Ref Range    Ionized Ca 1.02 (L) 1.12 - 1.32 mMOL/L    Calcium, Ion 4.08 (L) 4.48 - 5.28 MG/DL   CBC Auto Differential    Collection Time: 01/23/20  4:45 AM   Result Value Ref Range    WBC 12.6 (H) 4.0 - 10.5 K/CU MM    RBC 2.75 (L) 4.6 - 6.2 M/CU MM    Hemoglobin (LL) 13.5 - 18.0 GM/DL     6.6  CALLED TO MACKENZIE RICKS RN AT 0504, [facility] MLS  RESULTS READ BACK      Hematocrit 22.4 (L) 42 - 52 %    MCV 81.5 78 - 100 FL    MCH 24.0 (L) 27 - 31 PG    MCHC 29.5 (L) 32.0 - 36.0 %    RDW 20.9 (H) 11.7 - 14.9 %    Platelets 966 022 - 161 K/CU MM    MPV 12.2 (H) 7.5 - 11.1 FL    Immature Neutrophil % 1.0 (H) 0 - 0.43 %    Segs Relative 94.1 (H) 36 - 66 %    Eosinophils % 0.0 0 - 3 %    Basophils % 0.1 0 - 1 %    Lymphocytes % 3.7 (L) 24 - 44 %    Monocytes % 1.1 0 - 4 %    Total Immature Neutrophil 0.13 K/CU MM    Segs Absolute 11.9 K/CU MM    Eosinophils Absolute 0.0 K/CU MM    Basophils Absolute 0.0 K/CU MM    Lymphocytes Absolute 0.5 K/CU MM    Monocytes Absolute 0.1 K/CU MM    Differential Type MANUAL DIFFERENTIAL     Microcytes 1+     Hypochromia 1+     Ovalocytes 1+     Acanthocytes 1+     Giant PLTs PRESENT    Renal Function Panel    Collection Time: 01/23/20  4:45 AM   Result Value Ref Range    Sodium 133 (L) 135 - 145 MMOL/L    Potassium 4.7 3.5 - 5.1 MMOL/L    Chloride 94 (L) 99 - 110 mMol/L    CO2 25 21 - 32 MMOL/L    Anion Gap 14 4 - 16    BUN 28 (H) 6 - 23 MG/DL    CREATININE 4.2 (H) 0.9 - 1.3 MG/DL    Glucose 116 (H) 70 - 99 MG/DL    Calcium 7.6 (L) 8.3 - 10.6 MG/DL    GFR Non- 16 (L) >60 mL/min/1.73m2    GFR  19 (L) >60 mL/min/1.73m2    Alb 2.5 (L) 3.4 - 5.0 GM/DL    Phosphorus 5.1 (H) 2.5 - 4.9 MG/DL   APTT    Collection Time: 01/23/20  4:45 AM   Result Value Ref Range    aPTT 109.6 (H) 25.1 - 37.1 SECONDS   TYPE AND SCREEN    Collection Time: 01/23/20  5:35 AM   Result Value Ref Range    ABO/Rh A POSITIVE     Antibody Screen NEGATIVE     Unit Number [id number]     Component LEUKO-POOR RED CELLS     Unit Divison 00     Status ISSUED     Transfusion Status OK TO TRANSFUSE     Crossmatch Result COMPATIBLE      CULTURE results: Invalid input(s): BLOOD CULTURE,  URINE CULTURE, SURGICAL CULTURE    Diagnosis:  Patient Active Problem List   Diagnosis    Acute renal failure (HCC)    Elevated serum creatinine       Active Problems  Active Problems:    Acute renal failure (HCC)    Elevated serum creatinine  Resolved Problems:    * No resolved hospital problems.  *    Electronically signed by: Electronically signed by Stas Chan MD on 1/23/2020 at 10:42 AM

## 2020-01-23 NOTE — ANESTHESIA POSTPROCEDURE EVALUATION
Department of Anesthesiology  Postprocedure Note    Patient: Farhad Avila MRN: [id number]  YOB: 1975  Date of evaluation: 1/23/2020  Time:  1:59 PM     Procedure Summary     Date:  01/23/20 Room / Location:  [address] [place] [address]    Anesthesia Start:  3668 Anesthesia Stop:  9397    Procedure:  BRONCHOSCOPY ALVEOLAR LAVAGE BILATERALLY (Bilateral ) Diagnosis:  (RULE OUT TB)    Surgeon:  Ying Fuller MD Responsible Provider:  Jenny Martínez MD    Anesthesia Type:  MAC, TIVA ASA Status:  2          Anesthesia Type: MAC, TIVA    Mikayla Phase I:      Mikayla Phase II:      Last vitals: Reviewed and per EMR flowsheets.        Anesthesia Post Evaluation    Patient location during evaluation: bedside  Patient participation: complete - patient participated  Level of consciousness: awake and alert  Pain score: 0  Airway patency: patent  Nausea & Vomiting: no vomiting and no nausea  Complications: no  Cardiovascular status: blood pressure returned to baseline and hemodynamically stable  Respiratory status: acceptable, spontaneous ventilation, nonlabored ventilation and nasal cannula  Hydration status: stable

## 2020-01-23 NOTE — ANESTHESIA PRE PROCEDURE
Cary Sutherland MD       [facility] Tara Vasquez ON 1/30/2020] predniSONE (DELTASONE) tablet 20 mg  20 mg Oral Daily Stas Chan MD        diphenhydrAMINE (BENADRYL) tablet 50 mg  50 mg Oral Nightly PRN Eri Rileyvers, DO   50 mg at 01/22/20 2039    hydrOXYzine (VISTARIL) capsule 25 mg  25 mg Oral TID PRN Eri Spears, DO   25 mg at 01/23/20 0455    sodium chloride flush 0.9 % injection 10 mL  10 mL Intravenous PRN Thais Segundo MD        calcium gluconate 1 g in dextrose 5 % 100 mL IVPB  1 g Intravenous PRN Golden Carroll MD   Stopped at 01/21/20 1920    Or    calcium gluconate 2 g in dextrose 5 % 100 mL IVPB  2 g Intravenous PRN Golden Carroll MD        Or    calcium gluconate 3 g in dextrose 5 % 100 mL IVPB  3 g Intravenous PRN Golden Carroll MD        Or    calcium gluconate 4 g in dextrose 5 % 90 mL IVPB  4 g Intravenous PRN Golden Carroll MD        [Held by provider] heparin (porcine) injection 5,550 Units  80 Units/kg Intravenous PRN Bela Landrum MD       [facility] [place] by provider] heparin (porcine) injection 2,780 Units  40 Units/kg Intravenous PRN Bela Landrum MD   2,780 Units at 01/21/20 1931    0.9 % sodium chloride infusion 250 mL  250 mL Intravenous Once URBANO [place] - CNP        LORazepam (ATIVAN) injection 0.5 mg  0.5 mg Intravenous Once Golden Carroll MD        metoprolol tartrate (LOPRESSOR) tablet 50 mg  50 mg Oral BID Eri Spears, DO   Stopped at 01/21/20 2100    calcium acetate (PHOSLO) capsule 1,334 mg  1,334 mg Oral TID  Thais Segundo MD   Stopped at 01/23/20 0833    sevelamer (RENVELA) tablet 800 mg  800 mg Oral TID  Thais Segundo MD   Stopped at 01/23/20 0423    calcitRIOL (ROCALTROL) capsule 0.5 mcg  0.5 mcg Oral Daily Thais Segundo MD   Stopped at 01/22/20 0838    atorvastatin (LIPITOR) tablet 40 mg  40 mg Oral Nightly Moses Saucedo MD   40 mg at 01/22/20 2039    sodium chloride flush 0.9 % injection 10 mL  10 mL Intravenous 2 times per day [employer], DO   10 mL at 01/23/20 0830    sodium chloride flush 0.9 % injection 10 mL  10 mL Intravenous PRN [employer], DO   10 mL at 01/23/20 0430    magnesium hydroxide (MILK OF MAGNESIA) 400 MG/5ML suspension 30 mL  30 mL Oral Daily PRN [employer], DO        ondansetron (ZOFRAN) injection 4 mg  4 mg Intravenous Q6H PRN Perla Palaciosson, DO   4 mg at 01/23/20 0429    acetaminophen (TYLENOL) tablet 650 mg  650 mg Oral Q6H PRN Juju Faithlah, APRN - CNP   650 mg at 01/22/20 2040       Allergies:  No Known Allergies    Problem List:    Patient Active Problem List   Diagnosis Code    Acute renal failure (HCC) N17.9    Elevated serum creatinine R79.89       Past Medical History:        Diagnosis Date    H. pylori infection     per patient    H/O urinary tract infection     in childhood, per patient       Past Surgical History:  No past surgical history on file. Social History:    Social History     Tobacco Use    Smoking status: Never Smoker    Smokeless tobacco: Never Used   Substance Use Topics    Alcohol use: Never     Frequency: Never                                Counseling given: Not Answered      Vital Signs (Current): There were no vitals filed for this visit.                                            BP Readings from Last 3 Encounters:   01/23/20 99/73       NPO Status:                                                                                 BMI:   Wt Readings from Last 3 Encounters:   01/23/20 150 lb 2.1 oz (68.1 kg)     There is no height or weight on file to calculate BMI.    CBC:   Lab Results   Component Value Date    WBC 12.6 01/23/2020    RBC 2.75 01/23/2020    HGB  01/23/2020     6.6  CALLED TO MACKENZIE RICKS RN AT 0504, [facility]  RESULTS READ BACK      HCT 22.4 01/23/2020    MCV 81.5 01/23/2020    RDW 20.9 01/23/2020     01/23/2020       CMP:   Lab Results   Component Value Date     01/23/2020    K 4.7 01/23/2020    CL 94 01/23/2020    CO2 25 01/23/2020    BUN 28 01/23/2020    CREATININE 4.2 01/23/2020    GFRAA 19 01/23/2020    LABGLOM 16 01/23/2020    GLUCOSE 116 01/23/2020    PROT 4.6 01/21/2020    CALCIUM 7.6 01/23/2020    BILITOT 0.1 01/21/2020    ALKPHOS 41 01/21/2020    AST 14 01/21/2020    ALT 7 01/21/2020       POC Tests: No results for input(s): POCGLU, POCNA, POCK, POCCL, POCBUN, POCHEMO, POCHCT in the last 72 hours. Coags:   Lab Results   Component Value Date    PROTIME 11.2 01/17/2020    INR 0.93 01/17/2020    APTT 109.6 01/23/2020       HCG (If Applicable): No results found for: PREGTESTUR, PREGSERUM, HCG, HCGQUANT     ABGs:   Lab Results   Component Value Date    PO2ART 77 01/19/2020    HTX6DJY 39.0 01/19/2020    OEA7ZQE 26.5 01/19/2020        Type & Screen (If Applicable):  No results found for: LABABO, [address]    Anesthesia Evaluation  Nursing notes reviewed  Airway: Mallampati: II  TM distance: >3 FB   Neck ROM: full  Mouth opening: > = 3 FB Dental: normal exam         Pulmonary:   (+) decreased breath sounds,                            ROS comment: PossibleTB   Cardiovascular:Negative CV ROS  Exercise tolerance: good (>4 METS),         ECG reviewed  Rhythm: regular  Rate: normal           Beta Blocker:  Dose within 24 Hrs         Neuro/Psych:   Negative Neuro/Psych ROS              GI/Hepatic/Renal: Neg GI/Hepatic/Renal ROS  (+) renal disease: ARF,           Endo/Other: Negative Endo/Other ROS                     ROS comment: HIV, Hep B Abdominal:           Vascular: negative vascular ROS.                                   EKG  Sinus tachycardia   Minimal voltage criteria for LVH, may be normal variant   ST & T wave abnormality, consider inferolateral ischemia   Abnormal ECG   When compared with ECG of 15-SONAL-2020 07:31,   Inverted T waves have replaced nonspecific T wave abnormality in Lateral leads   Confirmed by [facility] MD, Ronie Lefort ([id number]) on 1/18/2020 5:41:30 PM        Anesthesia Plan      MAC and TIVA     ASA 2       Induction: intravenous. Anesthetic plan and risks discussed with patient. Plan discussed with attending.                   URBANO Arias - CRNA   1/23/2020

## 2020-01-24 LAB
ABO/RH: NORMAL
ALBUMIN SERPL-MCNC: 2.3 GM/DL (ref 3.4–5)
ALP BLD-CCNC: 45 IU/L (ref 40–128)
ALT SERPL-CCNC: 21 U/L (ref 10–40)
ANION GAP SERPL CALCULATED.3IONS-SCNC: 10 MMOL/L (ref 4–16)
ANTIBODY SCREEN: NEGATIVE
APTT: 68.4 SECONDS (ref 25.1–37.1)
AST SERPL-CCNC: 35 IU/L (ref 15–37)
BASOPHILS ABSOLUTE: 0 K/CU MM
BASOPHILS RELATIVE PERCENT: 0.1 % (ref 0–1)
BILIRUB SERPL-MCNC: 0.2 MG/DL (ref 0–1)
BUN BLDV-MCNC: 29 MG/DL (ref 6–23)
CALCIUM SERPL-MCNC: 7.7 MG/DL (ref 8.3–10.6)
CHLORIDE BLD-SCNC: 97 MMOL/L (ref 99–110)
CO2: 27 MMOL/L (ref 21–32)
COMPONENT: NORMAL
CREAT SERPL-MCNC: 4.6 MG/DL (ref 0.9–1.3)
CROSSMATCH RESULT: NORMAL
DIFFERENTIAL TYPE: ABNORMAL
EOSINOPHILS ABSOLUTE: 0 K/CU MM
EOSINOPHILS RELATIVE PERCENT: 0 % (ref 0–3)
GFR AFRICAN AMERICAN: 17 ML/MIN/1.73M2
GFR NON-AFRICAN AMERICAN: 14 ML/MIN/1.73M2
GLUCOSE BLD-MCNC: 107 MG/DL (ref 70–99)
HCT VFR BLD CALC: 25.3 % (ref 42–52)
HEMOGLOBIN: 7.7 GM/DL (ref 13.5–18)
IMMATURE NEUTROPHIL %: 1.3 % (ref 0–0.43)
LYMPHOCYTES ABSOLUTE: 0.3 K/CU MM
LYMPHOCYTES RELATIVE PERCENT: 2.8 % (ref 24–44)
MAGNESIUM: 2.2 MG/DL (ref 1.8–2.4)
MCH RBC QN AUTO: 25.1 PG (ref 27–31)
MCHC RBC AUTO-ENTMCNC: 30.4 % (ref 32–36)
MCV RBC AUTO: 82.4 FL (ref 78–100)
MONOCYTES ABSOLUTE: 0.2 K/CU MM
MONOCYTES RELATIVE PERCENT: 1.7 % (ref 0–4)
NUCLEATED RBC %: 0.2 %
PDW BLD-RTO: 20.8 % (ref 11.7–14.9)
PHOSPHORUS: 5.2 MG/DL (ref 2.5–4.9)
PLATELET # BLD: 206 K/CU MM (ref 140–440)
PMV BLD AUTO: 11.8 FL (ref 7.5–11.1)
POTASSIUM SERPL-SCNC: 4.8 MMOL/L (ref 3.5–5.1)
RBC # BLD: 3.07 M/CU MM (ref 4.6–6.2)
SEGMENTED NEUTROPHILS ABSOLUTE COUNT: 9 K/CU MM
SEGMENTED NEUTROPHILS RELATIVE PERCENT: 94.1 % (ref 36–66)
SODIUM BLD-SCNC: 134 MMOL/L (ref 135–145)
STATUS: NORMAL
TOTAL IMMATURE NEUTOROPHIL: 0.12 K/CU MM
TOTAL NUCLEATED RBC: 0 K/CU MM
TOTAL PROTEIN: 4.7 GM/DL (ref 6.4–8.2)
TRANSFUSION STATUS: NORMAL
UNIT DIVISION: 0
UNIT NUMBER: NORMAL
WBC # BLD: 9.6 K/CU MM (ref 4–10.5)

## 2020-01-24 PROCEDURE — 80069 RENAL FUNCTION PANEL: CPT

## 2020-01-24 PROCEDURE — 2580000003 HC RX 258: Performed by: INTERNAL MEDICINE

## 2020-01-24 PROCEDURE — 6360000002 HC RX W HCPCS: Performed by: INTERNAL MEDICINE

## 2020-01-24 PROCEDURE — 6360000002 HC RX W HCPCS: Performed by: PHYSICIAN ASSISTANT

## 2020-01-24 PROCEDURE — 6370000000 HC RX 637 (ALT 250 FOR IP): Performed by: INTERNAL MEDICINE

## 2020-01-24 PROCEDURE — 2700000000 HC OXYGEN THERAPY PER DAY

## 2020-01-24 PROCEDURE — 83735 ASSAY OF MAGNESIUM: CPT

## 2020-01-24 PROCEDURE — 99232 SBSQ HOSP IP/OBS MODERATE 35: CPT | Performed by: INTERNAL MEDICINE

## 2020-01-24 PROCEDURE — C9113 INJ PANTOPRAZOLE SODIUM, VIA: HCPCS | Performed by: INTERNAL MEDICINE

## 2020-01-24 PROCEDURE — 85730 THROMBOPLASTIN TIME PARTIAL: CPT

## 2020-01-24 PROCEDURE — 94761 N-INVAS EAR/PLS OXIMETRY MLT: CPT

## 2020-01-24 PROCEDURE — 6370000000 HC RX 637 (ALT 250 FOR IP): Performed by: NURSE PRACTITIONER

## 2020-01-24 PROCEDURE — 80053 COMPREHEN METABOLIC PANEL: CPT

## 2020-01-24 PROCEDURE — 2500000003 HC RX 250 WO HCPCS: Performed by: INTERNAL MEDICINE

## 2020-01-24 PROCEDURE — 2580000003 HC RX 258: Performed by: STUDENT IN AN ORGANIZED HEALTH CARE EDUCATION/TRAINING PROGRAM

## 2020-01-24 PROCEDURE — 6370000000 HC RX 637 (ALT 250 FOR IP): Performed by: STUDENT IN AN ORGANIZED HEALTH CARE EDUCATION/TRAINING PROGRAM

## 2020-01-24 PROCEDURE — 2000000000 HC ICU R&B

## 2020-01-24 PROCEDURE — 90935 HEMODIALYSIS ONE EVALUATION: CPT

## 2020-01-24 PROCEDURE — 85025 COMPLETE CBC W/AUTO DIFF WBC: CPT

## 2020-01-24 PROCEDURE — 84100 ASSAY OF PHOSPHORUS: CPT

## 2020-01-24 PROCEDURE — P9047 ALBUMIN (HUMAN), 25%, 50ML: HCPCS | Performed by: INTERNAL MEDICINE

## 2020-01-24 PROCEDURE — 6360000002 HC RX W HCPCS: Performed by: STUDENT IN AN ORGANIZED HEALTH CARE EDUCATION/TRAINING PROGRAM

## 2020-01-24 RX ORDER — HEPARIN SODIUM 1000 [USP'U]/ML
2600 INJECTION, SOLUTION INTRAVENOUS; SUBCUTANEOUS ONCE
Status: COMPLETED | OUTPATIENT
Start: 2020-01-24 | End: 2020-01-24

## 2020-01-24 RX ORDER — PROMETHAZINE HYDROCHLORIDE 25 MG/ML
12.5 INJECTION, SOLUTION INTRAMUSCULAR; INTRAVENOUS ONCE
Status: COMPLETED | OUTPATIENT
Start: 2020-01-25 | End: 2020-01-25

## 2020-01-24 RX ORDER — POTASSIUM CHLORIDE 29.8 MG/ML
20 INJECTION INTRAVENOUS PRN
Status: DISCONTINUED | OUTPATIENT
Start: 2020-01-24 | End: 2020-01-24

## 2020-01-24 RX ORDER — PROMETHAZINE HYDROCHLORIDE 25 MG/ML
6.25 INJECTION, SOLUTION INTRAMUSCULAR; INTRAVENOUS EVERY 6 HOURS PRN
Status: DISCONTINUED | OUTPATIENT
Start: 2020-01-24 | End: 2020-01-24

## 2020-01-24 RX ORDER — ALBUMIN (HUMAN) 12.5 G/50ML
12.5 SOLUTION INTRAVENOUS ONCE
Status: COMPLETED | OUTPATIENT
Start: 2020-01-24 | End: 2020-01-24

## 2020-01-24 RX ORDER — METOCLOPRAMIDE HYDROCHLORIDE 5 MG/ML
10 INJECTION INTRAMUSCULAR; INTRAVENOUS ONCE
Status: COMPLETED | OUTPATIENT
Start: 2020-01-24 | End: 2020-01-24

## 2020-01-24 RX ADMIN — CEFEPIME HYDROCHLORIDE 2 G: 2 INJECTION, POWDER, FOR SOLUTION INTRAVENOUS at 18:09

## 2020-01-24 RX ADMIN — ONDANSETRON HYDROCHLORIDE 4 MG: 2 INJECTION, SOLUTION INTRAMUSCULAR; INTRAVENOUS at 21:24

## 2020-01-24 RX ADMIN — SODIUM CHLORIDE, PRESERVATIVE FREE 10 ML: 5 INJECTION INTRAVENOUS at 08:49

## 2020-01-24 RX ADMIN — ACETAMINOPHEN 650 MG: 325 TABLET ORAL at 21:25

## 2020-01-24 RX ADMIN — EPOETIN ALFA-EPBX 10000 UNITS: 10000 INJECTION, SOLUTION INTRAVENOUS; SUBCUTANEOUS at 16:57

## 2020-01-24 RX ADMIN — HEPARIN SODIUM 2600 UNITS: 1000 INJECTION, SOLUTION INTRAVENOUS; SUBCUTANEOUS at 14:43

## 2020-01-24 RX ADMIN — SEVELAMER CARBONATE 800 MG: 800 TABLET, FILM COATED ORAL at 08:50

## 2020-01-24 RX ADMIN — METRONIDAZOLE 500 MG: 500 INJECTION, SOLUTION INTRAVENOUS at 18:21

## 2020-01-24 RX ADMIN — MIDODRINE HYDROCHLORIDE 10 MG: 5 TABLET ORAL at 14:43

## 2020-01-24 RX ADMIN — PANTOPRAZOLE SODIUM 40 MG: 40 INJECTION, POWDER, FOR SOLUTION INTRAVENOUS at 08:49

## 2020-01-24 RX ADMIN — MIDODRINE HYDROCHLORIDE 10 MG: 5 TABLET ORAL at 18:08

## 2020-01-24 RX ADMIN — ALBUMIN (HUMAN) 12.5 G: 0.25 INJECTION, SOLUTION INTRAVENOUS at 14:49

## 2020-01-24 RX ADMIN — ATORVASTATIN CALCIUM 40 MG: 40 TABLET, FILM COATED ORAL at 21:25

## 2020-01-24 RX ADMIN — METRONIDAZOLE 500 MG: 500 INJECTION, SOLUTION INTRAVENOUS at 04:45

## 2020-01-24 RX ADMIN — DOXYCYCLINE HYCLATE 100 MG: 100 TABLET, COATED ORAL at 08:49

## 2020-01-24 RX ADMIN — CALCITRIOL CAPSULES 0.25 MCG 0.5 MCG: 0.25 CAPSULE ORAL at 08:50

## 2020-01-24 RX ADMIN — CALCIUM ACETATE 1334 MG: 667 CAPSULE ORAL at 18:08

## 2020-01-24 RX ADMIN — SODIUM CHLORIDE, PRESERVATIVE FREE 10 ML: 5 INJECTION INTRAVENOUS at 21:24

## 2020-01-24 RX ADMIN — DIPHENHYDRAMINE HYDROCHLORIDE 50 MG: 25 TABLET ORAL at 21:25

## 2020-01-24 RX ADMIN — HEPARIN SODIUM AND DEXTROSE 14 UNITS/KG/HR: 10000; 5 INJECTION INTRAVENOUS at 02:46

## 2020-01-24 RX ADMIN — METOCLOPRAMIDE 10 MG: 5 INJECTION, SOLUTION INTRAMUSCULAR; INTRAVENOUS at 23:22

## 2020-01-24 RX ADMIN — MIDODRINE HYDROCHLORIDE 10 MG: 5 TABLET ORAL at 08:49

## 2020-01-24 RX ADMIN — SEVELAMER CARBONATE 800 MG: 800 TABLET, FILM COATED ORAL at 18:09

## 2020-01-24 RX ADMIN — CALCIUM ACETATE 1334 MG: 667 CAPSULE ORAL at 08:50

## 2020-01-24 RX ADMIN — PREDNISONE 40 MG: 20 TABLET ORAL at 08:50

## 2020-01-24 RX ADMIN — PREDNISONE 40 MG: 20 TABLET ORAL at 21:25

## 2020-01-24 RX ADMIN — DOXYCYCLINE HYCLATE 100 MG: 100 TABLET, COATED ORAL at 21:25

## 2020-01-24 RX ADMIN — SULFAMETHOXAZOLE AND TRIMETHOPRIM 337.6 MG: 80; 16 INJECTION, SOLUTION, CONCENTRATE INTRAVENOUS at 18:21

## 2020-01-24 ASSESSMENT — PAIN SCALES - GENERAL
PAINLEVEL_OUTOF10: 0
PAINLEVEL_OUTOF10: 2
PAINLEVEL_OUTOF10: 0
PAINLEVEL_OUTOF10: 2
PAINLEVEL_OUTOF10: 0

## 2020-01-24 ASSESSMENT — PAIN DESCRIPTION - PAIN TYPE
TYPE: ACUTE PAIN
TYPE: ACUTE PAIN

## 2020-01-24 ASSESSMENT — PAIN DESCRIPTION - LOCATION
LOCATION: GENERALIZED
LOCATION: GENERALIZED

## 2020-01-24 NOTE — PROGRESS NOTES
Nephrology Progress Note  1/24/2020 11:53 AM  Subjective:   Admit Date: 1/15/2020  PCP: No primary care provider on file. Interval History:  Pt state doing better today and for dialysis today    Diet: DIET GENERAL;  Pain is: Moderate      Data:   Scheduled Meds:   epoetin david-epbx  10,000 Units Intravenous Once in dialysis    midodrine  10 mg Oral TID WC    cefepime  2 g Intravenous Once per day on Mon Wed Fri    sulfamethoxazole-trimethoprim (BACTRIM) IVPB  5 mg/kg Intravenous Q24H    metroNIDAZOLE  500 mg Intravenous Q8H    pantoprazole  40 mg Intravenous Daily    doxycycline hyclate  100 mg Oral 2 times per day    predniSONE  40 mg Oral BID    Followed by   Candice Castellanos ON 1/25/2020] predniSONE  40 mg Oral Daily    [START ON 1/30/2020] predniSONE  20 mg Oral Daily    0.9 % sodium chloride  250 mL Intravenous Once    LORazepam  0.5 mg Intravenous Once    metoprolol tartrate  50 mg Oral BID    calcium acetate  1,334 mg Oral TID WC    sevelamer  800 mg Oral TID WC    calcitRIOL  0.5 mcg Oral Daily    atorvastatin  40 mg Oral Nightly    sodium chloride flush  10 mL Intravenous 2 times per day     Continuous Infusions:   heparin (porcine) 14 Units/kg/hr (01/24/20 0552)     PRN Meds:potassium chloride, diphenhydrAMINE, hydrOXYzine, sodium chloride flush, calcium gluconate IVPB **OR** calcium gluconate IVPB **OR** calcium gluconate IVPB **OR** calcium gluconate IVPB, heparin (porcine), heparin (porcine), sodium chloride flush, magnesium hydroxide, ondansetron, acetaminophen  I/O last 3 completed shifts: In: 4038 [I.V.:523; Blood:350; IV Piggyback:800]  Out: 1450 [Urine:100]  No intake/output data recorded.     Intake/Output Summary (Last 24 hours) at 1/24/2020 1153  Last data filed at 1/24/2020 0556  Gross per 24 hour   Intake 2173 ml   Output 1450 ml   Net 723 ml     CBC:   Recent Labs     01/22/20  0505 01/23/20  0445 01/23/20  1740 01/23/20  2245 01/24/20  0451   WBC 12.3* 12.6*  --   --  9.6   HGB

## 2020-01-24 NOTE — PROGRESS NOTES
Pulmonary and Critical Care  Progress Note      VITALS:  /71   Pulse 69   Temp 97.6 °F (36.4 °C) (Oral)   Resp 20   Ht 5' 2\" (1.575 m)   Wt 150 lb 5.7 oz (68.2 kg)   SpO2 100%   BMI 27.50 kg/m²     Subjective:   CHIEF COMPLAINT :Intermittent fevers x 1 month     HPI:                The patient is a 45 y.o. male with significant past medical history of None  presents with complaints of intermittent fever x 1 month associated with anorexia and weight loss. He is being treated with abx and his cultures have been negative,. He has been diagnosed with HIV. He is afebrile and hemodynamically stable. Objective:   PHYSICAL EXAM:    LUNGS:Occasional basal crackles  Abd-soft, BS+, NT  Ext- no pedal edema  CVS-s1s2, no murmurs      DATA:    CBC:  Recent Labs     01/22/20  0505 01/23/20 0445 01/23/20  1740 01/23/20 2245 01/24/20  0451   WBC 12.3* 12.6*  --   --  9.6   RBC 3.14* 2.75*  --   --  3.07*   HGB 7.5* 6.6  CALLED TO MACKENZIE RICKS RN AT 0504, [facility] MLS  RESULTS READ BACK  * 7.7* 7.9* 7.7*   HCT 25.5* 22.4* 25.5* 25.6* 25.3*    217  --   --  206   MCV 81.2 81.5  --   --  82.4   MCH 23.9* 24.0*  --   --  25.1*   MCHC 29.4* 29.5*  --   --  30.4*   RDW 21.1* 20.9*  --   --  20.8*   SEGSPCT 93.7* 94.1*  --   --  94.1*      BMP:  Recent Labs     01/22/20  0505 01/23/20  0445 01/24/20  0451   * 133* 134*   K 4.7 4.7 4.8   CL 95* 94* 97*   CO2 23 25 27   BUN 32* 28* 29*   CREATININE 4.3* 4.2* 4.6*   CALCIUM 7.7* 7.6* 7.7*   GLUCOSE 110* 116* 107*      ABG:  No results for input(s): PH, PO2ART, XFT9XQO, HCO3, BEART, O2SAT in the last 72 hours. BNP  No results found for: BNP   D-Dimer:  No results found for: DDIMER   1. Radiology: None      Assessment/Plan     Patient Active Problem List    Diagnosis Date Noted    Elevated serum creatinine     Acute renal failure ([facility] [address] 01/15/2020   Patient is lying in the bed.  He is not in acute resp distress    Acute hypoxic resp failure  Multifocal pneumonia  HIV  Small- moderate right pleural effusion  Anemia          1. Abx per ID  2. F/u C&S  3. F/iu H&H  4. HD per renal  5. C/w present management  No follow-ups on file.     Electronically signed by Ирина Osman MD on 1/24/2020 at 12:44 PM

## 2020-01-24 NOTE — PROGRESS NOTES
sulfamethoxazole-trimethoprim (BACTRIM) IVPB  5 mg/kg Intravenous Q24H    metroNIDAZOLE  500 mg Intravenous Q8H    pantoprazole  40 mg Intravenous Daily    doxycycline hyclate  100 mg Oral 2 times per day    predniSONE  40 mg Oral BID    Followed by   Jose Payne ON 1/25/2020] predniSONE  40 mg Oral Daily    [START ON 1/30/2020] predniSONE  20 mg Oral Daily    0.9 % sodium chloride  250 mL Intravenous Once    LORazepam  0.5 mg Intravenous Once    metoprolol tartrate  50 mg Oral BID    calcium acetate  1,334 mg Oral TID WC    sevelamer  800 mg Oral TID WC    calcitRIOL  0.5 mcg Oral Daily    atorvastatin  40 mg Oral Nightly    sodium chloride flush  10 mL Intravenous 2 times per day      Infusions:    heparin (porcine) 14 Units/kg/hr (01/24/20 0552)     PRN Meds: diphenhydrAMINE, 50 mg, Nightly PRN  hydrOXYzine, 25 mg, TID PRN  sodium chloride flush, 10 mL, PRN  calcium gluconate IVPB, 1 g, PRN    Or  calcium gluconate IVPB, 2 g, PRN    Or  calcium gluconate IVPB, 3 g, PRN    Or  calcium gluconate IVPB, 4 g, PRN  heparin (porcine), 80 Units/kg, PRN  heparin (porcine), 40 Units/kg, PRN  sodium chloride flush, 10 mL, PRN  magnesium hydroxide, 30 mL, Daily PRN  ondansetron, 4 mg, Q6H PRN  acetaminophen, 650 mg, Q6H PRN            Pertinent New Labs & Imaging Studies     CBC:   Lab Results   Component Value Date    WBC 9.6 01/24/2020    RBC 3.07 01/24/2020    HGB 7.7 01/24/2020    HCT 25.3 01/24/2020    MCV 82.4 01/24/2020    MCH 25.1 01/24/2020    MCHC 30.4 01/24/2020    RDW 20.8 01/24/2020     01/24/2020    MPV 11.8 01/24/2020     BMP:    Lab Results   Component Value Date     01/24/2020    K 4.8 01/24/2020    CL 97 01/24/2020    CO2 27 01/24/2020    BUN 29 01/24/2020    LABALBU 2.3 01/24/2020    LABALBU 40 01/15/2020    CREATININE 4.6 01/24/2020    CALCIUM 7.7 01/24/2020    GFRAA 17 01/24/2020    LABGLOM 14 01/24/2020    GLUCOSE 107 01/24/2020     Ct Abdomen Pelvis Wo Contrast Additional duodenitis. No evidence of perforation. 2. Punctate nonobstructing left renal calculus. No acute obstructive uropathy. 3. Small amount of free fluid in the pelvic cavity is favored reactive. 4. Mildly enlarged retroperitoneal lymph nodes may reflect reactive adenopathy, lymphoproliferative disorder, or metastatic disease. 5. Mild edema in the right lower lobe with trace layering right pleural effusion. 6. Mild anasarca. Xr Chest Standard (2 Vw)    Result Date: 1/15/2020  EXAMINATION: TWO XRAY VIEWS OF THE CHEST 1/15/2020 5:42 am COMPARISON: None. HISTORY: ORDERING SYSTEM PROVIDED HISTORY: Chest pain TECHNOLOGIST PROVIDED HISTORY: Reason for exam:->Chest pain Reason for Exam: chest pain Acuity: Acute Type of Exam: Initial FINDINGS: The cardiac silhouette is within normal limits for size. There is no focal consolidation, pleural effusion or pneumothorax. The visualized osseous structures demonstrate no acute abnormality. No acute cardiopulmonary abnormality. Xr Chest Portable    Result Date: 1/16/2020  EXAMINATION: ONE XRAY VIEW OF THE CHEST 1/15/2020 7:55 am COMPARISON: 01/15/2020. HISTORY: ORDERING SYSTEM PROVIDED HISTORY: SOB TECHNOLOGIST PROVIDED HISTORY: Reason for exam:->SOB Reason for Exam: SOB Acuity: Acute Type of Exam: Subsequent/Follow-up FINDINGS: Interval placement of a right jugular central venous catheter with the tip at the level of the SVC/right atrial junction. No evidence of a pneumothorax. The cardiac silhouette appears within normal limits for size given portable technique. There is no focal consolidation. No pleural effusion or pneumothorax. 1. Right jugular central venous catheter with the tip at the level of the SVC/right atrial junction. 2. No pneumothorax. 3. No focal consolidation.      Us Retroperitoneal Complete    Result Date: 1/15/2020  EXAMINATION: RETROPERITONEAL ULTRASOUND OF THE KIDNEYS AND URINARY BLADDER 1/15/2020 COMPARISON: None HISTORY: [address] PROVIDED HISTORY: DWIGHT; please characterize kidneys and screen for obstruction TECHNOLOGIST PROVIDED HISTORY: Reason for exam:->DWIGHT; please characterize kidneys and screen for obstruction Reason for Exam: DWIGHT Acuity: Acute Type of Exam: Initial Additional signs and symptoms: nk Relevant Medical/Surgical History: nk FINDINGS: Kidneys: The right kidney measures 13 cm in length and the left kidney measures 13 cm in length. The kidneys are hyperechoic. There is no hydronephrosis or shadowing stone. Bladder: The urinary bladder is normal and incompletely distended. No hydronephrosis. Ir Nontunneled Vascular Catheter > 5 Years    Result Date: 1/15/2020  PROCEDURE: ULTRASOUND GUIDED VASCULAR ACCESS. FLUOROSCOPY GUIDED PLACEMENT OF A NON-TUNNELED CATHETER. 1/15/2020. HISTORY: ORDERING SYSTEM PROVIDED HISTORY: Renal insufficiency TECHNOLOGIST PROVIDED HISTORY: Reason for exam:->temp cath Acute renal failure, needs dialysis SEDATION: None FLUOROSCOPY DOSE AND TYPE OR TIME AND EXPOSURES: 0.5 minutes, air kerma 8 TECHNIQUE: Informed consent was obtained after a detailed explanation of the procedure including risks, benefits, and alternatives. Universal protocol was observed. The right neck and chest were prepped and draped in sterile fashion using maximum sterile barrier technique. Local anesthesia was achieved with lidocaine. A micropuncture needle was used to access the right internal jugular vein using ultrasound guidance. An ultrasound image demonstrating patency of the vein with needle tip located within it. An image was obtained and stored in PACs. A 0.035 guidewire was used to place a none tunneled triple-lumen HD catheter using fluoroscopic guidance with tip in the right atrium after fascial tract dilation. The catheter flushed easily and there was a good blood return. The catheter was sutured to the skin. The catheter was locked with heparinized saline.  The patient tolerated the procedure well and there were no immediate complications. EBL: Less than 5 cc FINDINGS: Fluoroscopic image demonstrates the tip of the catheter in the right atrium. Successful ultrasound and fluoroscopy guided non-tunneled HD catheter placement. Catheter is okay for use. Assessment and Plan:   Clayton Tapia is a 45 y.o.  male  who presents with on and off fever    Acute hypoxic respiratory failure secondary to bilateral pneumonia and pulmonary embolism-improving  Extensive airspace disease per CT chest  Patient is HIV positive  Status post bronchoscopy, Samples sent  On treatment with Bactrim for possible PJP pneumonia   ID following, on prednisone, cefepime and doxycycline  MRSA negative, vancomycin discontinued  On heparin drip for pulmonary embolism    Sepsis likely from pneumonia  Antibiotics as above  Currently PJP PCR pending   On negative suction for ruling out TB  Sputum cultures pending  Continue antibiotics as above   Multiple test pending from bronchoalveolar lavage including AFB stain and PJP PCR    Pulmonary embolism  Heparin drip as above  Will change to oral    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 Continue CRRT    Hypothermia-resolved    Newly diagnosed HIV infection  HIV viral load, genotype pending  CD4 coun 20. Treatment as above  Concern for IRIS if started treatment now and would like to rule out TB before initiating treatment. Hypertension-continue metoprolol    Diet DIET GENERAL;  Diet NPO, After Midnight  Dietary Nutrition Supplements: Renal Oral Supplement   DVT Prophylaxis [] Lovenox, [x]  Heparin, [] SCDs, [] Ambulation   GI Prophylaxis [x] PPI,  [] H2 Blocker,  [] Carafate,  [] Diet/Tube Feeds   Code Status Full Code   Disposition Patient requires continued admission due to respiratory failure. Multiple barriers with discharge. Awaiting for several test results.     MDM [] Low, [] Moderate,[x]  High  Patient's risk as above due to multiple conditions including respiratory failure, kidney failure.       Electronically signed by Jessica Resendez MD on 1/24/2020 at 4:01 PM

## 2020-01-24 NOTE — PROGRESS NOTES
Patient tolerated HD treatment well, 1.2 L removed. Patient educated on access care, understanding verbalized by patient. Patient given albumin for BP support and retacrit for anemia during treatment. Both ports heparin locked and capped. Next HD treatment is Monday. Patient to have tunneled HD CVC placed Monday. Report given to RADHA Beaver.

## 2020-01-24 NOTE — PLAN OF CARE
Nutrition Problem: Inadequate oral intake  Intervention: Food and/or Nutrient Delivery: Continue current diet, Start ONS  Nutritional Goals: pt will consume greater than 75% of his meals and supplements

## 2020-01-24 NOTE — PROGRESS NOTES
Infectious Disease Progress Note  2020   Patient Name: Rsahel Quiñonez. : 1975   Impression  · AIDS-advanced HIV  ? CD4 count 20, HIV viral load: 1.7 million  ? HIV genotype: No significant resistance  ? Plan to treat with Tenofovir disoproxil fumarate (TDF) weekly, lamivudine (3TC) and dolutegravir (DTG) daily, after TB and other OIs (opportunistic infections) are ruled out  · Bilateral pneumonia with hypoxic respiratory failure  ? Afebrile, oxygen requirements are not increasing. ? S/p bronchoscopy with BAL on 2020 (appreciate pulmonology assistance)  ? Etiology of pneumonia remains unknown  ? Procalcitonin continues to rise: ? Mycobacterium  ? Fungitell negative, PJP uncertain, ? Toxoplasma pneumonitis  ? MRSA nares negative, Fungitell negative, toxoplasma IgG positive  ? Histoplasma antigen, Blastomyces antigen, cryptococcus antigen and negative  · Pulmonary Embolism- right pulmonary artery  ? Remains on heparin drip  · Acute kidney injury with ATN  ? Required CRRT  ? S/p left renal biopsy with CT showing pararenal fluid likely blood/hematoma  · Past hepatitis B infection  ? Hepatitis B surface antibody positive, anti-HB C antibody positive, hepatitis B surface antigen negative  ? Hepatitis C antibody negative  · Multi-morbidity: per PMHx  Plan:  · Continue cefepime, doxycycline, metronidazole, Bactrim and prednisone  · F/u BAL for PJP DFA, PJP PCR, toxoplasma PCR, AFB, fungal, bacterial culture      Ongoing Antimicrobial Therapy  Cefepime   Doxycycline   Metronidazole   Bactrim   Completed Antimicrobial Therapy  Vancomycin -  ? History:? Interval history noted, bilateral pneumonia  No change in symptoms.    Physical Exam:  Vital Signs: /73   Pulse 78   Temp 97.6 °F (36.4 °C) (Oral)   Resp 14   Ht 5' 2\" (1.575 m)   Wt 150 lb 5.7 oz (68.2 kg)   SpO2 100%   BMI 27.50 kg/m²     Gen: alert and oriented X3, no distress  Skin: no stigmata of endocarditis  Wounds: C/D/I  HEMT: AT/NC Oropharynx pink, moist, and without lesions or exudates; dentition in good state of repair  Eyes: PERRLA, EOMI, conjunctiva pink, sclera anicteric. Neck: Supple. Trachea midline. No LAD. Chest: no distress and CTA. Good air movement. Heart: RRR and no MRG. Abd: soft, non-distended, no tenderness, no hepatomegaly. Normoactive bowel sounds. Ext: no clubbing, cyanosis, or edema  Catheter Site: without erythema or tenderness  Neuro: Mental status intact. CN 2-12 intact and no focal sensory or motor deficits     Radiologic / Imaging / TESTING  No results found.      Labs:    Recent Results (from the past 24 hour(s))   Bronchial culture    Collection Time: 01/23/20  1:25 PM   Result Value Ref Range    Specimen BRONCHIAL WASHING RT LUNG     Special Requests NONE     Culture CULTURE IN PROGRESS    Bronchial culture    Collection Time: 01/23/20  1:25 PM   Result Value Ref Range    Specimen BRONCHIAL WASHING LT LUNG     Special Requests NONE     Culture CULTURE IN PROGRESS    APTT    Collection Time: 01/23/20  5:40 PM   Result Value Ref Range    aPTT 29.9 25.1 - 37.1 SECONDS   Hemoglobin and hematocrit, blood    Collection Time: 01/23/20  5:40 PM   Result Value Ref Range    Hemoglobin 7.7 (L) 13.5 - 18.0 GM/DL    Hematocrit 25.5 (L) 42 - 52 %   APTT    Collection Time: 01/23/20 10:45 PM   Result Value Ref Range    aPTT 63.1 (H) 25.1 - 37.1 SECONDS   Hemoglobin and hematocrit, blood    Collection Time: 01/23/20 10:45 PM   Result Value Ref Range    Hemoglobin 7.9 (L) 13.5 - 18.0 GM/DL    Hematocrit 25.6 (L) 42 - 52 %   CBC Auto Differential    Collection Time: 01/24/20  4:51 AM   Result Value Ref Range    WBC 9.6 4.0 - 10.5 K/CU MM    RBC 3.07 (L) 4.6 - 6.2 M/CU MM    Hemoglobin 7.7 (L) 13.5 - 18.0 GM/DL    Hematocrit 25.3 (L) 42 - 52 %    MCV 82.4 78 - 100 FL    MCH 25.1 (L) 27 - 31 PG    MCHC 30.4 (L) 32.0 - 36.0 %    RDW 20.8 (H) 11.7 - 14.9 %    Platelets 610 433 - 010 K/CU MM    MPV 11.8 (H) 7.5 - 11.1 FL    Differential Type AUTOMATED DIFFERENTIAL     Segs Relative 94.1 (H) 36 - 66 %    Lymphocytes % 2.8 (L) 24 - 44 %    Monocytes % 1.7 0 - 4 %    Eosinophils % 0.0 0 - 3 %    Basophils % 0.1 0 - 1 %    Segs Absolute 9.0 K/CU MM    Lymphocytes Absolute 0.3 K/CU MM    Monocytes Absolute 0.2 K/CU MM    Eosinophils Absolute 0.0 K/CU MM    Basophils Absolute 0.0 K/CU MM    Nucleated RBC % 0.2 %    Total Nucleated RBC 0.0 K/CU MM    Total Immature Neutrophil 0.12 K/CU MM    Immature Neutrophil % 1.3 (H) 0 - 0.43 %   APTT    Collection Time: 01/24/20  4:51 AM   Result Value Ref Range    aPTT 68.4 (H) 25.1 - 37.1 SECONDS   Comprehensive Metabolic Panel w/ Reflex to MG    Collection Time: 01/24/20  4:51 AM   Result Value Ref Range    Sodium 134 (L) 135 - 145 MMOL/L    Potassium 4.8 3.5 - 5.1 MMOL/L    Chloride 97 (L) 99 - 110 mMol/L    CO2 27 21 - 32 MMOL/L    BUN 29 (H) 6 - 23 MG/DL    CREATININE 4.6 (H) 0.9 - 1.3 MG/DL    Glucose 107 (H) 70 - 99 MG/DL    Calcium 7.7 (L) 8.3 - 10.6 MG/DL    Alb 2.3 (L) 3.4 - 5.0 GM/DL    Total Protein 4.7 (L) 6.4 - 8.2 GM/DL    Total Bilirubin 0.2 0.0 - 1.0 MG/DL    ALT 21 10 - 40 U/L    AST 35 15 - 37 IU/L    Alkaline Phosphatase 45 40 - 128 IU/L    GFR Non- 14 (L) >60 mL/min/1.73m2    GFR  17 (L) >60 mL/min/1.73m2    Anion Gap 10 4 - 16   Phosphorus    Collection Time: 01/24/20  4:51 AM   Result Value Ref Range    Phosphorus 5.2 (H) 2.5 - 4.9 MG/DL   Magnesium    Collection Time: 01/24/20  4:51 AM   Result Value Ref Range    Magnesium 2.2 1.8 - 2.4 mg/dl     CULTURE results: Invalid input(s): BLOOD CULTURE,  URINE CULTURE, SURGICAL CULTURE    Diagnosis:  Patient Active Problem List   Diagnosis    Acute renal failure (HCC)    Elevated serum creatinine       Active Problems  Active Problems:    Acute renal failure (HCC)    Elevated serum creatinine  Resolved Problems:    * No resolved hospital problems.  *    Electronically signed by: Electronically

## 2020-01-25 ENCOUNTER — APPOINTMENT (OUTPATIENT)
Dept: GENERAL RADIOLOGY | Age: 45
DRG: 890 | End: 2020-01-25
Payer: MEDICAID

## 2020-01-25 LAB
ALBUMIN SERPL-MCNC: 2.4 GM/DL (ref 3.4–5)
ANION GAP SERPL CALCULATED.3IONS-SCNC: 12 MMOL/L (ref 4–16)
APTT: 99.7 SECONDS (ref 25.1–37.1)
ASPERGILLUS ANTIBODY ID: NORMAL
ASPERGILLUS ANTIBODY ID: NORMAL
BASOPHILS ABSOLUTE: 0 K/CU MM
BASOPHILS RELATIVE PERCENT: 0 % (ref 0–1)
BLASTOMYCES ANTIBODY ID: NORMAL
BLASTOMYCES ANTIBODY ID: NORMAL
BUN BLDV-MCNC: 22 MG/DL (ref 6–23)
CALCIUM SERPL-MCNC: 7.8 MG/DL (ref 8.3–10.6)
CHLAMYDIA TRACHOMATIS AMPLIFIED DET: NEGATIVE
CHLAMYDIA TRACHOMATIS AMPLIFIED DET: NORMAL
CHLORIDE BLD-SCNC: 93 MMOL/L (ref 99–110)
CO2: 28 MMOL/L (ref 21–32)
COCCIDIOIDES ANTIBODY ID: NORMAL
COCCIDIOIDES ANTIBODY ID: NORMAL
CREAT SERPL-MCNC: 4 MG/DL (ref 0.9–1.3)
CULTURE: NORMAL
CULTURE: NORMAL
DIFFERENTIAL TYPE: ABNORMAL
EOSINOPHILS ABSOLUTE: 0 K/CU MM
EOSINOPHILS RELATIVE PERCENT: 0 % (ref 0–3)
GFR AFRICAN AMERICAN: 20 ML/MIN/1.73M2
GFR NON-AFRICAN AMERICAN: 17 ML/MIN/1.73M2
GLUCOSE BLD-MCNC: 121 MG/DL (ref 70–99)
GRAM SMEAR: NORMAL
HBV QNT LOG, IU/ML: NOT DETECTED LOG IU/ML
HBV QNT, IU/ML: NOT DETECTED IU/ML
HCT VFR BLD CALC: 24 % (ref 42–52)
HCT VFR BLD CALC: 24.8 % (ref 42–52)
HEMOGLOBIN: 7.4 GM/DL (ref 13.5–18)
HEMOGLOBIN: 7.6 GM/DL (ref 13.5–18)
HISTOPLASMA AB, ID: NORMAL
HISTOPLASMA AB, ID: NORMAL
HISTOPLASMA ANTIGEN URINE INTERP: NORMAL
HISTOPLASMA ANTIGEN URINE INTERP: NOT DETECTED
HISTOPLASMA ANTIGEN URINE: NOT DETECTED NG/ML
IMMATURE NEUTROPHIL %: 1.7 % (ref 0–0.43)
INTERPRETATION: NORMAL
INTERPRETATION: NOT DETECTED
LYMPHOCYTES ABSOLUTE: 0.4 K/CU MM
LYMPHOCYTES RELATIVE PERCENT: 4.6 % (ref 24–44)
Lab: NORMAL
MCH RBC QN AUTO: 25.2 PG (ref 27–31)
MCHC RBC AUTO-ENTMCNC: 30.8 % (ref 32–36)
MCV RBC AUTO: 81.6 FL (ref 78–100)
MONOCYTES ABSOLUTE: 0.2 K/CU MM
MONOCYTES RELATIVE PERCENT: 2 % (ref 0–4)
N GONORRHOEAE AMPLIFIED DET: NEGATIVE
N GONORRHOEAE AMPLIFIED DET: NORMAL
NUCLEATED RBC %: 0.3 %
PDW BLD-RTO: 20.7 % (ref 11.7–14.9)
PHOSPHORUS: 5.2 MG/DL (ref 2.5–4.9)
PLATELET # BLD: 176 K/CU MM (ref 140–440)
PMV BLD AUTO: 10.8 FL (ref 7.5–11.1)
POTASSIUM SERPL-SCNC: 4.1 MMOL/L (ref 3.5–5.1)
RBC # BLD: 2.94 M/CU MM (ref 4.6–6.2)
SEGMENTED NEUTROPHILS ABSOLUTE COUNT: 7 K/CU MM
SEGMENTED NEUTROPHILS RELATIVE PERCENT: 91.7 % (ref 36–66)
SODIUM BLD-SCNC: 133 MMOL/L (ref 135–145)
SPECIMEN: NORMAL
SPECIMEN: NORMAL
TEST NAME: NORMAL
TOTAL IMMATURE NEUTOROPHIL: 0.13 K/CU MM
TOTAL NUCLEATED RBC: 0 K/CU MM
WBC # BLD: 7.6 K/CU MM (ref 4–10.5)

## 2020-01-25 PROCEDURE — 80053 COMPREHEN METABOLIC PANEL: CPT

## 2020-01-25 PROCEDURE — 2500000003 HC RX 250 WO HCPCS: Performed by: INTERNAL MEDICINE

## 2020-01-25 PROCEDURE — 6370000000 HC RX 637 (ALT 250 FOR IP): Performed by: INTERNAL MEDICINE

## 2020-01-25 PROCEDURE — 2580000003 HC RX 258: Performed by: INTERNAL MEDICINE

## 2020-01-25 PROCEDURE — 85730 THROMBOPLASTIN TIME PARTIAL: CPT

## 2020-01-25 PROCEDURE — C9113 INJ PANTOPRAZOLE SODIUM, VIA: HCPCS | Performed by: INTERNAL MEDICINE

## 2020-01-25 PROCEDURE — 80069 RENAL FUNCTION PANEL: CPT

## 2020-01-25 PROCEDURE — 71045 X-RAY EXAM CHEST 1 VIEW: CPT

## 2020-01-25 PROCEDURE — 85014 HEMATOCRIT: CPT

## 2020-01-25 PROCEDURE — 36592 COLLECT BLOOD FROM PICC: CPT

## 2020-01-25 PROCEDURE — 99232 SBSQ HOSP IP/OBS MODERATE 35: CPT | Performed by: INTERNAL MEDICINE

## 2020-01-25 PROCEDURE — 85018 HEMOGLOBIN: CPT

## 2020-01-25 PROCEDURE — 2000000000 HC ICU R&B

## 2020-01-25 PROCEDURE — 6360000002 HC RX W HCPCS: Performed by: PHYSICIAN ASSISTANT

## 2020-01-25 PROCEDURE — 2580000003 HC RX 258: Performed by: STUDENT IN AN ORGANIZED HEALTH CARE EDUCATION/TRAINING PROGRAM

## 2020-01-25 PROCEDURE — 85025 COMPLETE CBC W/AUTO DIFF WBC: CPT

## 2020-01-25 PROCEDURE — 6370000000 HC RX 637 (ALT 250 FOR IP): Performed by: STUDENT IN AN ORGANIZED HEALTH CARE EDUCATION/TRAINING PROGRAM

## 2020-01-25 PROCEDURE — 6360000002 HC RX W HCPCS: Performed by: INTERNAL MEDICINE

## 2020-01-25 RX ORDER — WARFARIN SODIUM 5 MG/1
2.5 TABLET ORAL DAILY
Status: DISCONTINUED | OUTPATIENT
Start: 2020-01-25 | End: 2020-01-27

## 2020-01-25 RX ORDER — CHLORPROMAZINE HYDROCHLORIDE 25 MG/ML
25 INJECTION INTRAMUSCULAR ONCE
Status: COMPLETED | OUTPATIENT
Start: 2020-01-25 | End: 2020-01-25

## 2020-01-25 RX ADMIN — SEVELAMER CARBONATE 800 MG: 800 TABLET, FILM COATED ORAL at 14:29

## 2020-01-25 RX ADMIN — METRONIDAZOLE 500 MG: 500 INJECTION, SOLUTION INTRAVENOUS at 19:15

## 2020-01-25 RX ADMIN — PREDNISONE 40 MG: 20 TABLET ORAL at 10:00

## 2020-01-25 RX ADMIN — CALCIUM ACETATE 1334 MG: 667 CAPSULE ORAL at 19:12

## 2020-01-25 RX ADMIN — PANTOPRAZOLE SODIUM 40 MG: 40 INJECTION, POWDER, FOR SOLUTION INTRAVENOUS at 10:01

## 2020-01-25 RX ADMIN — DOXYCYCLINE HYCLATE 100 MG: 100 TABLET, COATED ORAL at 20:37

## 2020-01-25 RX ADMIN — CALCITRIOL CAPSULES 0.25 MCG 0.5 MCG: 0.25 CAPSULE ORAL at 10:18

## 2020-01-25 RX ADMIN — CALCIUM ACETATE 1334 MG: 667 CAPSULE ORAL at 09:59

## 2020-01-25 RX ADMIN — DIPHENHYDRAMINE HYDROCHLORIDE 50 MG: 25 TABLET ORAL at 20:37

## 2020-01-25 RX ADMIN — SEVELAMER CARBONATE 800 MG: 800 TABLET, FILM COATED ORAL at 19:12

## 2020-01-25 RX ADMIN — DOXYCYCLINE HYCLATE 100 MG: 100 TABLET, COATED ORAL at 09:57

## 2020-01-25 RX ADMIN — MIDODRINE HYDROCHLORIDE 10 MG: 5 TABLET ORAL at 19:12

## 2020-01-25 RX ADMIN — SULFAMETHOXAZOLE AND TRIMETHOPRIM 337.6 MG: 80; 16 INJECTION, SOLUTION, CONCENTRATE INTRAVENOUS at 19:20

## 2020-01-25 RX ADMIN — HYDROXYZINE PAMOATE 25 MG: 25 CAPSULE ORAL at 20:37

## 2020-01-25 RX ADMIN — MIDODRINE HYDROCHLORIDE 10 MG: 5 TABLET ORAL at 09:59

## 2020-01-25 RX ADMIN — PROMETHAZINE HYDROCHLORIDE 12.5 MG: 25 INJECTION INTRAMUSCULAR; INTRAVENOUS at 00:04

## 2020-01-25 RX ADMIN — ATORVASTATIN CALCIUM 40 MG: 40 TABLET, FILM COATED ORAL at 20:37

## 2020-01-25 RX ADMIN — WARFARIN SODIUM 2.5 MG: 5 TABLET ORAL at 19:12

## 2020-01-25 RX ADMIN — METRONIDAZOLE 500 MG: 500 INJECTION, SOLUTION INTRAVENOUS at 05:00

## 2020-01-25 RX ADMIN — CHLORPROMAZINE HYDROCHLORIDE 25 MG: 25 INJECTION INTRAMUSCULAR at 02:20

## 2020-01-25 RX ADMIN — MIDODRINE HYDROCHLORIDE 10 MG: 5 TABLET ORAL at 14:29

## 2020-01-25 RX ADMIN — METOPROLOL TARTRATE 50 MG: 50 TABLET, FILM COATED ORAL at 20:37

## 2020-01-25 RX ADMIN — SEVELAMER CARBONATE 800 MG: 800 TABLET, FILM COATED ORAL at 09:59

## 2020-01-25 RX ADMIN — CALCIUM ACETATE 1334 MG: 667 CAPSULE ORAL at 14:29

## 2020-01-25 RX ADMIN — HEPARIN SODIUM AND DEXTROSE 14 UNITS/KG/HR: 10000; 5 INJECTION INTRAVENOUS at 09:44

## 2020-01-25 RX ADMIN — SODIUM CHLORIDE, PRESERVATIVE FREE 10 ML: 5 INJECTION INTRAVENOUS at 10:01

## 2020-01-25 ASSESSMENT — PAIN SCALES - GENERAL
PAINLEVEL_OUTOF10: 0

## 2020-01-25 NOTE — PROGRESS NOTES
Resp 29   Ht 5' 2\" (1.575 m)   Wt 149 lb 7.6 oz (67.8 kg)   SpO2 100%   BMI 27.34 kg/m²     General appearance:  Thin, not in any  ac distress   HEENT:  ++ conj pallor  Neck:  supple  Lungs:  + coarse bronchial BS   Heart:  tachy  Abdomen: soft, tender   Extremities:  No edema       Problem List :         Impression :     1. 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 AIDs/ HIV - H will get ART soon- and R/O TB and pother oppotunistics infection and pul infection  3. Recommendation/Plan  :     1. So will plan for HD on Monday unless needed earlier   2. 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 Will adjust all med's renally  4. Follow clinically  5.  His phos may come down with HD      Que Lockett MD

## 2020-01-25 NOTE — PROGRESS NOTES
[address] [facility] is a 45 y.o. male on warfarin therapy for PE. Pharmacy consulted by Dr. Lena Johnson for monitoring and adjustment of treatment. Indication for anticoagulation: PE  INR goal: 2-3  Warfarin dose prior to admission: N/A    Pertinent Laboratory Values   Recent Labs     01/23/20  0445  01/24/20  0451  01/25/20  0520   HGB 6.6  CALLED TO MACKENZIE RICKS RN AT 0504, KYOUNG MLS  RESULTS READ BACK  *   < > 7.7*   < > 7.4*   HCT 22.4*   < > 25.3*   < > 24.0*     --  206  --  176    < > = values in this interval not displayed. Assessment/Plan:   Drug Interactions: Heparin Drip, Bactrim, Flagyl   INR - none today   Will start warfarin 2.5mg daily   Pharmacy will continue to monitor and adjust warfarin therapy as indicated    Thank you for the consult.   Mac Boo RPh  1/25/2020 12:19 PM

## 2020-01-25 NOTE — PROGRESS NOTES
exam:->DWIGHT; please characterize kidneys and screen for obstruction Reason for Exam: DWIGHT Acuity: Acute Type of Exam: Initial Additional signs and symptoms: nk Relevant Medical/Surgical History: nk FINDINGS: Kidneys: The right kidney measures 13 cm in length and the left kidney measures 13 cm in length. The kidneys are hyperechoic. There is no hydronephrosis or shadowing stone. Bladder: The urinary bladder is normal and incompletely distended. No hydronephrosis. Ir Nontunneled Vascular Catheter > 5 Years    Result Date: 1/15/2020  PROCEDURE: ULTRASOUND GUIDED VASCULAR ACCESS. FLUOROSCOPY GUIDED PLACEMENT OF A NON-TUNNELED CATHETER. 1/15/2020. HISTORY: ORDERING SYSTEM PROVIDED HISTORY: Renal insufficiency TECHNOLOGIST PROVIDED HISTORY: Reason for exam:->temp cath Acute renal failure, needs dialysis SEDATION: None FLUOROSCOPY DOSE AND TYPE OR TIME AND EXPOSURES: 0.5 minutes, air kerma 8 TECHNIQUE: Informed consent was obtained after a detailed explanation of the procedure including risks, benefits, and alternatives. Universal protocol was observed. The right neck and chest were prepped and draped in sterile fashion using maximum sterile barrier technique. Local anesthesia was achieved with lidocaine. A micropuncture needle was used to access the right internal jugular vein using ultrasound guidance. An ultrasound image demonstrating patency of the vein with needle tip located within it. An image was obtained and stored in PACs. A 0.035 guidewire was used to place a none tunneled triple-lumen HD catheter using fluoroscopic guidance with tip in the right atrium after fascial tract dilation. The catheter flushed easily and there was a good blood return. The catheter was sutured to the skin. The catheter was locked with heparinized saline. The patient tolerated the procedure well and there were no immediate complications.  EBL: Less than 5 cc FINDINGS: Fluoroscopic image demonstrates the tip of the catheter in

## 2020-01-26 LAB
ALBUMIN SERPL-MCNC: 2.5 GM/DL (ref 3.4–5)
ANION GAP SERPL CALCULATED.3IONS-SCNC: 14 MMOL/L (ref 4–16)
ANISOCYTOSIS: ABNORMAL
APTT: 99.4 SECONDS (ref 25.1–37.1)
BUN BLDV-MCNC: 37 MG/DL (ref 6–23)
CALCIUM SERPL-MCNC: 8.1 MG/DL (ref 8.3–10.6)
CHLORIDE BLD-SCNC: 89 MMOL/L (ref 99–110)
CO2: 25 MMOL/L (ref 21–32)
CREAT SERPL-MCNC: 5.7 MG/DL (ref 0.9–1.3)
DIFFERENTIAL TYPE: ABNORMAL
GFR AFRICAN AMERICAN: 13 ML/MIN/1.73M2
GFR NON-AFRICAN AMERICAN: 11 ML/MIN/1.73M2
GLUCOSE BLD-MCNC: 89 MG/DL (ref 70–99)
HCT VFR BLD CALC: 25.9 % (ref 42–52)
HEMOGLOBIN: 8 GM/DL (ref 13.5–18)
INR BLD: 1.21 INDEX
LYMPHOCYTES ABSOLUTE: 0.5 K/CU MM
LYMPHOCYTES RELATIVE PERCENT: 6 % (ref 24–44)
MCH RBC QN AUTO: 25.2 PG (ref 27–31)
MCHC RBC AUTO-ENTMCNC: 30.9 % (ref 32–36)
MCV RBC AUTO: 81.7 FL (ref 78–100)
MICROCYTES: ABNORMAL
MONOCYTES ABSOLUTE: 0.1 K/CU MM
MONOCYTES RELATIVE PERCENT: 1 % (ref 0–4)
PDW BLD-RTO: 21.2 % (ref 11.7–14.9)
PHOSPHORUS: 5.7 MG/DL (ref 2.5–4.9)
PLATELET # BLD: 227 K/CU MM (ref 140–440)
PMV BLD AUTO: 11.8 FL (ref 7.5–11.1)
PNEUMOCYSTIS JIROVECII PCR: NORMAL
PNEUMOCYSTIS JIROVECII PCR: NOT DETECTED
POLYCHROMASIA: ABNORMAL
POTASSIUM SERPL-SCNC: 4 MMOL/L (ref 3.5–5.1)
PROTHROMBIN TIME: 14.7 SECONDS (ref 11.7–14.5)
RBC # BLD: 3.17 M/CU MM (ref 4.6–6.2)
SEGMENTED NEUTROPHILS ABSOLUTE COUNT: 7.3 K/CU MM
SEGMENTED NEUTROPHILS RELATIVE PERCENT: 93 % (ref 36–66)
SODIUM BLD-SCNC: 128 MMOL/L (ref 135–145)
SOURCE: NORMAL
WBC # BLD: 7.9 K/CU MM (ref 4–10.5)

## 2020-01-26 PROCEDURE — 2580000003 HC RX 258: Performed by: STUDENT IN AN ORGANIZED HEALTH CARE EDUCATION/TRAINING PROGRAM

## 2020-01-26 PROCEDURE — 6370000000 HC RX 637 (ALT 250 FOR IP): Performed by: INTERNAL MEDICINE

## 2020-01-26 PROCEDURE — 85730 THROMBOPLASTIN TIME PARTIAL: CPT

## 2020-01-26 PROCEDURE — 87305 ASPERGILLUS AG IA: CPT

## 2020-01-26 PROCEDURE — 2500000003 HC RX 250 WO HCPCS: Performed by: INTERNAL MEDICINE

## 2020-01-26 PROCEDURE — 36592 COLLECT BLOOD FROM PICC: CPT

## 2020-01-26 PROCEDURE — 6370000000 HC RX 637 (ALT 250 FOR IP): Performed by: STUDENT IN AN ORGANIZED HEALTH CARE EDUCATION/TRAINING PROGRAM

## 2020-01-26 PROCEDURE — 85027 COMPLETE CBC AUTOMATED: CPT

## 2020-01-26 PROCEDURE — 2580000003 HC RX 258: Performed by: INTERNAL MEDICINE

## 2020-01-26 PROCEDURE — 85610 PROTHROMBIN TIME: CPT

## 2020-01-26 PROCEDURE — 2060000000 HC ICU INTERMEDIATE R&B

## 2020-01-26 PROCEDURE — 6360000002 HC RX W HCPCS: Performed by: INTERNAL MEDICINE

## 2020-01-26 PROCEDURE — 80053 COMPREHEN METABOLIC PANEL: CPT

## 2020-01-26 PROCEDURE — 80069 RENAL FUNCTION PANEL: CPT

## 2020-01-26 PROCEDURE — 94761 N-INVAS EAR/PLS OXIMETRY MLT: CPT

## 2020-01-26 PROCEDURE — C9113 INJ PANTOPRAZOLE SODIUM, VIA: HCPCS | Performed by: INTERNAL MEDICINE

## 2020-01-26 PROCEDURE — 94640 AIRWAY INHALATION TREATMENT: CPT

## 2020-01-26 PROCEDURE — 85007 BL SMEAR W/DIFF WBC COUNT: CPT

## 2020-01-26 RX ORDER — IPRATROPIUM BROMIDE AND ALBUTEROL SULFATE 2.5; .5 MG/3ML; MG/3ML
1 SOLUTION RESPIRATORY (INHALATION) PRN
Status: DISCONTINUED | OUTPATIENT
Start: 2020-01-26 | End: 2020-02-06 | Stop reason: HOSPADM

## 2020-01-26 RX ADMIN — IPRATROPIUM BROMIDE AND ALBUTEROL SULFATE 1 AMPULE: .5; 3 SOLUTION RESPIRATORY (INHALATION) at 10:52

## 2020-01-26 RX ADMIN — CALCITRIOL CAPSULES 0.25 MCG 0.5 MCG: 0.25 CAPSULE ORAL at 08:46

## 2020-01-26 RX ADMIN — HYDROXYZINE PAMOATE 25 MG: 25 CAPSULE ORAL at 20:13

## 2020-01-26 RX ADMIN — CALCIUM ACETATE 1334 MG: 667 CAPSULE ORAL at 17:11

## 2020-01-26 RX ADMIN — METOPROLOL TARTRATE 50 MG: 50 TABLET, FILM COATED ORAL at 20:13

## 2020-01-26 RX ADMIN — METRONIDAZOLE 500 MG: 500 INJECTION, SOLUTION INTRAVENOUS at 20:12

## 2020-01-26 RX ADMIN — SODIUM CHLORIDE, PRESERVATIVE FREE 10 ML: 5 INJECTION INTRAVENOUS at 08:48

## 2020-01-26 RX ADMIN — SEVELAMER CARBONATE 800 MG: 800 TABLET, FILM COATED ORAL at 08:47

## 2020-01-26 RX ADMIN — CALCIUM ACETATE 1334 MG: 667 CAPSULE ORAL at 11:23

## 2020-01-26 RX ADMIN — MIDODRINE HYDROCHLORIDE 10 MG: 5 TABLET ORAL at 17:11

## 2020-01-26 RX ADMIN — ATORVASTATIN CALCIUM 40 MG: 40 TABLET, FILM COATED ORAL at 20:13

## 2020-01-26 RX ADMIN — PANTOPRAZOLE SODIUM 40 MG: 40 INJECTION, POWDER, FOR SOLUTION INTRAVENOUS at 08:47

## 2020-01-26 RX ADMIN — DOXYCYCLINE HYCLATE 100 MG: 100 TABLET, COATED ORAL at 08:47

## 2020-01-26 RX ADMIN — SEVELAMER CARBONATE 800 MG: 800 TABLET, FILM COATED ORAL at 11:23

## 2020-01-26 RX ADMIN — METRONIDAZOLE 500 MG: 500 INJECTION, SOLUTION INTRAVENOUS at 05:51

## 2020-01-26 RX ADMIN — METRONIDAZOLE 500 MG: 500 INJECTION, SOLUTION INTRAVENOUS at 11:23

## 2020-01-26 RX ADMIN — MIDODRINE HYDROCHLORIDE 10 MG: 5 TABLET ORAL at 08:46

## 2020-01-26 RX ADMIN — WARFARIN SODIUM 2.5 MG: 5 TABLET ORAL at 17:12

## 2020-01-26 RX ADMIN — PREDNISONE 40 MG: 20 TABLET ORAL at 08:48

## 2020-01-26 RX ADMIN — SULFAMETHOXAZOLE AND TRIMETHOPRIM 337.6 MG: 80; 16 INJECTION, SOLUTION, CONCENTRATE INTRAVENOUS at 18:10

## 2020-01-26 RX ADMIN — CALCIUM ACETATE 1334 MG: 667 CAPSULE ORAL at 08:45

## 2020-01-26 RX ADMIN — HYDROXYZINE PAMOATE 25 MG: 25 CAPSULE ORAL at 10:06

## 2020-01-26 RX ADMIN — DOXYCYCLINE HYCLATE 100 MG: 100 TABLET, COATED ORAL at 20:13

## 2020-01-26 RX ADMIN — SODIUM CHLORIDE, PRESERVATIVE FREE 10 ML: 5 INJECTION INTRAVENOUS at 20:13

## 2020-01-26 RX ADMIN — SEVELAMER CARBONATE 800 MG: 800 TABLET, FILM COATED ORAL at 17:12

## 2020-01-26 RX ADMIN — MIDODRINE HYDROCHLORIDE 10 MG: 5 TABLET ORAL at 11:22

## 2020-01-26 RX ADMIN — HEPARIN SODIUM AND DEXTROSE 14 UNITS/KG/HR: 10000; 5 INJECTION INTRAVENOUS at 11:50

## 2020-01-26 ASSESSMENT — PAIN SCALES - GENERAL
PAINLEVEL_OUTOF10: 0

## 2020-01-26 NOTE — FLOWSHEET NOTE
Transfer handoff from [facility] to [facility] in room 2015. Pt A&Ox4. Heparin infusing and both RN's handed off and verified. Pt transferred via bed and with all documented belongings. Pt transferred with all appropriate protective equipment and mask. Airborne precautions cart with hepa filters and masks plugged in outside room 2015. Pt's cell phone placed on  and in wall socket, per Pt request.  Pt's dinner with him in room 2015. Pt moving all extremities. Pt has call light and bed alarm on.

## 2020-01-26 NOTE — PROGRESS NOTES
Hospitalist Progress Note      Name:  Carl Brown /Age/Sex: 1975  (45 y.o. male)   MRN & CSN:  [id number] & [id number] Admission Date/Time: 1/15/2020  4:05 AM   Location:  -A PCP: No primary care provider on file. Hospital Day: 12    History of Present Illness:     Chief Complaint: Carl Brown is a 45 y.o.  male  who presents with intermittent fever     The patient seen and examined. He says has been having some dry cough, needed breathing treatment. Ten point ROS reviewed negative, unless as noted above    Objective: Intake/Output Summary (Last 24 hours) at 2020 1419  Last data filed at 2020 1255  Gross per 24 hour   Intake 2351.43 ml   Output 600 ml   Net 1751.43 ml      Vitals:   Vitals:    20 0913   BP: 102/68   Pulse: 68   Resp: 22   Temp: 97.8 °F (36.6 °C)   SpO2: 100%     Physical Exam:   General Appearance: alert and oriented to person, place and time, in no acute distress  Cardiovascular: normal rate, regular rhythm, normal S1 and S2, no murmurs, rubs, clicks, or gallops, distal pulses intact, no carotid bruits, no JVD  Pulmonary/Chest: Air entry bilaterally equal.  With there is occasional crackles bilaterally.   Coarse breath sounds bilaterally with occasional wheezing  Abdomen: soft, non-tender, non-distended, normal bowel sounds, no masses   Extremities: no cyanosis, clubbing or edema, pulse   Skin: warm and dry, no rash or erythema  Head: normocephalic and atraumatic  Eyes: pupils equal, round, and reactive to light  Neck: supple and non-tender without mass, no thyromegaly   Musculoskeletal: normal range of motion, no joint swelling, deformity or tenderness  Neurological: alert, oriented, normal speech, no focal findings or movement disorder noted    Medications:   Medications:    warfarin  2.5 mg Oral Daily    midodrine  10 mg Oral TID WC    cefepime  2 g Intravenous Once per day on     sulfamethoxazole-trimethoprim (BACTRIM) IVPB  5 mg/kg Intravenous Q24H    metroNIDAZOLE  500 mg Intravenous Q8H    pantoprazole  40 mg Intravenous Daily    doxycycline hyclate  100 mg Oral 2 times per day    predniSONE  40 mg Oral Daily    [START ON 1/30/2020] predniSONE  20 mg Oral Daily    0.9 % sodium chloride  250 mL Intravenous Once    LORazepam  0.5 mg Intravenous Once    metoprolol tartrate  50 mg Oral BID    calcium acetate  1,334 mg Oral TID WC    sevelamer  800 mg Oral TID WC    calcitRIOL  0.5 mcg Oral Daily    atorvastatin  40 mg Oral Nightly    sodium chloride flush  10 mL Intravenous 2 times per day      Infusions:    heparin (porcine) 14 Units/kg/hr (01/26/20 1150)     PRN Meds: ipratropium-albuterol, 1 ampule, PRN  diphenhydrAMINE, 50 mg, Nightly PRN  hydrOXYzine, 25 mg, TID PRN  sodium chloride flush, 10 mL, PRN  calcium gluconate IVPB, 1 g, PRN    Or  calcium gluconate IVPB, 2 g, PRN    Or  calcium gluconate IVPB, 3 g, PRN    Or  calcium gluconate IVPB, 4 g, PRN  heparin (porcine), 80 Units/kg, PRN  heparin (porcine), 40 Units/kg, PRN  sodium chloride flush, 10 mL, PRN  magnesium hydroxide, 30 mL, Daily PRN  ondansetron, 4 mg, Q6H PRN  acetaminophen, 650 mg, Q6H PRN            Pertinent New Labs & Imaging Studies     CBC:   Lab Results   Component Value Date    WBC 7.9 01/26/2020    RBC 3.17 01/26/2020    HGB 8.0 01/26/2020    HCT 25.9 01/26/2020    MCV 81.7 01/26/2020    MCH 25.2 01/26/2020    MCHC 30.9 01/26/2020    RDW 21.2 01/26/2020     01/26/2020    MPV 11.8 01/26/2020     BMP:    Lab Results   Component Value Date     01/26/2020    K 4.0 01/26/2020    CL 89 01/26/2020    CO2 25 01/26/2020    BUN 37 01/26/2020    LABALBU 2.5 01/26/2020    LABALBU 40 01/15/2020    CREATININE 5.7 01/26/2020    CALCIUM 8.1 01/26/2020    GFRAA 13 01/26/2020    LABGLOM 11 01/26/2020    GLUCOSE 89 01/26/2020     Ct Abdomen Pelvis Wo Contrast Additional Contrast? None    Result Date: 1/16/2020  EXAMINATION: CT OF THE left renal calculus. No acute obstructive uropathy. 3. Small amount of free fluid in the pelvic cavity is favored reactive. 4. Mildly enlarged retroperitoneal lymph nodes may reflect reactive adenopathy, lymphoproliferative disorder, or metastatic disease. 5. Mild edema in the right lower lobe with trace layering right pleural effusion. 6. Mild anasarca. Xr Chest Standard (2 Vw)    Result Date: 1/15/2020  EXAMINATION: TWO XRAY VIEWS OF THE CHEST 1/15/2020 5:42 am COMPARISON: None. HISTORY: ORDERING SYSTEM PROVIDED HISTORY: Chest pain TECHNOLOGIST PROVIDED HISTORY: Reason for exam:->Chest pain Reason for Exam: chest pain Acuity: Acute Type of Exam: Initial FINDINGS: The cardiac silhouette is within normal limits for size. There is no focal consolidation, pleural effusion or pneumothorax. The visualized osseous structures demonstrate no acute abnormality. No acute cardiopulmonary abnormality. Xr Chest Portable    Result Date: 1/16/2020  EXAMINATION: ONE XRAY VIEW OF THE CHEST 1/15/2020 7:55 am COMPARISON: 01/15/2020. HISTORY: ORDERING SYSTEM PROVIDED HISTORY: SOB TECHNOLOGIST PROVIDED HISTORY: Reason for exam:->SOB Reason for Exam: SOB Acuity: Acute Type of Exam: Subsequent/Follow-up FINDINGS: Interval placement of a right jugular central venous catheter with the tip at the level of the SVC/right atrial junction. No evidence of a pneumothorax. The cardiac silhouette appears within normal limits for size given portable technique. There is no focal consolidation. No pleural effusion or pneumothorax. 1. Right jugular central venous catheter with the tip at the level of the SVC/right atrial junction. 2. No pneumothorax. 3. No focal consolidation.      Us Retroperitoneal Complete    Result Date: 1/15/2020  EXAMINATION: RETROPERITONEAL ULTRASOUND OF THE KIDNEYS AND URINARY BLADDER 1/15/2020 COMPARISON: None HISTORY: ORDERING SYSTEM PROVIDED HISTORY: DWIGHT; please characterize kidneys and screen for obstruction TECHNOLOGIST PROVIDED HISTORY: Reason for exam:->DWIGHT; please characterize kidneys and screen for obstruction Reason for Exam: DWIGHT Acuity: Acute Type of Exam: Initial Additional signs and symptoms: nk Relevant Medical/Surgical History: nk FINDINGS: Kidneys: The right kidney measures 13 cm in length and the left kidney measures 13 cm in length. The kidneys are hyperechoic. There is no hydronephrosis or shadowing stone. Bladder: The urinary bladder is normal and incompletely distended. No hydronephrosis. Ir Nontunneled Vascular Catheter > 5 Years    Result Date: 1/15/2020  PROCEDURE: ULTRASOUND GUIDED VASCULAR ACCESS. FLUOROSCOPY GUIDED PLACEMENT OF A NON-TUNNELED CATHETER. 1/15/2020. HISTORY: ORDERING SYSTEM PROVIDED HISTORY: Renal insufficiency TECHNOLOGIST PROVIDED HISTORY: Reason for exam:->temp cath Acute renal failure, needs dialysis SEDATION: None FLUOROSCOPY DOSE AND TYPE OR TIME AND EXPOSURES: 0.5 minutes, air kerma 8 TECHNIQUE: Informed consent was obtained after a detailed explanation of the procedure including risks, benefits, and alternatives. Universal protocol was observed. The right neck and chest were prepped and draped in sterile fashion using maximum sterile barrier technique. Local anesthesia was achieved with lidocaine. A micropuncture needle was used to access the right internal jugular vein using ultrasound guidance. An ultrasound image demonstrating patency of the vein with needle tip located within it. An image was obtained and stored in PACs. A 0.035 guidewire was used to place a none tunneled triple-lumen HD catheter using fluoroscopic guidance with tip in the right atrium after fascial tract dilation. The catheter flushed easily and there was a good blood return. The catheter was sutured to the skin. The catheter was locked with heparinized saline. The patient tolerated the procedure well and there were no immediate complications.  EBL: Less than 5 cc FINDINGS: Multiple barriers with discharge. Awaiting for several test results. Likely to stay 2-3 more days as just started Coumadin, need to continue heparin drip until INR becomes therapeutic   MDM [] Low, [x] Moderate,[]  High  Patient's risk as above due to multiple conditions including respiratory failure, kidney failure.       Electronically signed by Jessica Resendez MD on 1/26/2020 at 2:19 PM

## 2020-01-26 NOTE — PROGRESS NOTES
Nephrology Progress Note  1/26/2020 11:45 AM        Subjective:   Admit Date: 1/15/2020  PCP: No primary care provider on file. Interval History: in isolation    Diet: better    ROS:  Doing well, no sob, with heparin, some UOP per pt     Data:     Current med's:    warfarin  2.5 mg Oral Daily    midodrine  10 mg Oral TID WC    cefepime  2 g Intravenous Once per day on Mon Wed Fri    sulfamethoxazole-trimethoprim (BACTRIM) IVPB  5 mg/kg Intravenous Q24H    metroNIDAZOLE  500 mg Intravenous Q8H    pantoprazole  40 mg Intravenous Daily    doxycycline hyclate  100 mg Oral 2 times per day    predniSONE  40 mg Oral Daily    [START ON 1/30/2020] predniSONE  20 mg Oral Daily    0.9 % sodium chloride  250 mL Intravenous Once    LORazepam  0.5 mg Intravenous Once    metoprolol tartrate  50 mg Oral BID    calcium acetate  1,334 mg Oral TID WC    sevelamer  800 mg Oral TID WC    calcitRIOL  0.5 mcg Oral Daily    atorvastatin  40 mg Oral Nightly    sodium chloride flush  10 mL Intravenous 2 times per day      heparin (porcine) 14 Units/kg/hr (01/25/20 0944)         I/O last 3 completed shifts:   In: 1881.4 [P.O.:960; I.V.:221.4; IV Piggyback:700]  Out: 600 [Urine:600]    CBC:   Recent Labs     01/24/20  0451 01/25/20  0230 01/25/20  0520 01/26/20  0555   WBC 9.6  --  7.6 7.9   HGB 7.7* 7.6* 7.4* 8.0*     --  176 227          Recent Labs     01/24/20  0451 01/25/20  0520 01/26/20  0555   * 133* 128*   K 4.8 4.1 4.0   CL 97* 93* 89*   CO2 27 28 25   BUN 29* 22 37*   CREATININE 4.6* 4.0* 5.7*   GLUCOSE 107* 121* 89       Lab Results   Component Value Date    CALCIUM 8.1 (L) 01/26/2020    PHOS 5.7 (H) 01/26/2020       Objective:     Vitals: /68   Pulse 68   Temp 97.8 °F (36.6 °C) (Oral)   Resp 22   Ht 5' 2\" (1.575 m)   Wt 152 lb 5.4 oz (69.1 kg)   SpO2 100%   BMI 27.86 kg/m²     General appearance:  No ac distress  HEENT:  + + conj  palor  Neck:  supple  Lungs:  Few adv BS  Heart:  Seems RRR  Abdomen: soft  Extremities:  No overt LE edema       Problem List :         Impression :     1. DWIGHT- likely from combo of collapsing GN ( HIVAN ) and ATI with massive proteinuria - poor Bx specimen- and nephrotic syndrome    2. AIDS with low CD4 -   3. opportunistic > infection / anemia /   4. VTE likely precipitated by nephrotic syndrome \  5. Low na likely from access TBW- UF in am     Recommendation/Plan  :     1. HD in am   2. CA/ IV iron as needed  3.  He is with prophylaxis   4. follow clinically       Betty Marx MD

## 2020-01-26 NOTE — PROGRESS NOTES
Pt arrive to unit bed 2015. Pt belongings include cell phone and  and clothing. Pt is alert and oriented x4. Skin is clean, dry, and intact. Oriented to room. Call light in reach and bed alarm on. Nothing needed at this time.

## 2020-01-26 NOTE — FLOWSHEET NOTE
Pt states that he is having SOB. SPO2 100%. RR 26. This nurse called RT, who is coming to do a breathing tx. At this time.  Dr. Rahul Prieto placed tx order to stepdown

## 2020-01-27 ENCOUNTER — APPOINTMENT (OUTPATIENT)
Dept: INTERVENTIONAL RADIOLOGY/VASCULAR | Age: 45
DRG: 890 | End: 2020-01-27
Payer: MEDICAID

## 2020-01-27 LAB
ALBUMIN SERPL-MCNC: 2.8 GM/DL (ref 3.4–5)
ANION GAP SERPL CALCULATED.3IONS-SCNC: 16 MMOL/L (ref 4–16)
APTT: 85.4 SECONDS (ref 25.1–37.1)
BASOPHILS ABSOLUTE: 0 K/CU MM
BASOPHILS RELATIVE PERCENT: 0.1 % (ref 0–1)
BUN BLDV-MCNC: 51 MG/DL (ref 6–23)
CALCIUM SERPL-MCNC: 8.3 MG/DL (ref 8.3–10.6)
CHLORIDE BLD-SCNC: 85 MMOL/L (ref 99–110)
CO2: 22 MMOL/L (ref 21–32)
CREAT SERPL-MCNC: 6.7 MG/DL (ref 0.9–1.3)
DIFFERENTIAL TYPE: ABNORMAL
EOSINOPHILS ABSOLUTE: 0 K/CU MM
EOSINOPHILS RELATIVE PERCENT: 0.1 % (ref 0–3)
GFR AFRICAN AMERICAN: 11 ML/MIN/1.73M2
GFR NON-AFRICAN AMERICAN: 9 ML/MIN/1.73M2
GLUCOSE BLD-MCNC: 100 MG/DL (ref 70–99)
HCT VFR BLD CALC: 29.9 % (ref 42–52)
HEMOGLOBIN: 9.2 GM/DL (ref 13.5–18)
HIGH SENSITIVE C-REACTIVE PROTEIN: 55.7 MG/L
IMMATURE NEUTROPHIL %: 0.8 % (ref 0–0.43)
INR BLD: 1.96 INDEX
LYMPHOCYTES ABSOLUTE: 0.4 K/CU MM
LYMPHOCYTES RELATIVE PERCENT: 3.4 % (ref 24–44)
MCH RBC QN AUTO: 25 PG (ref 27–31)
MCHC RBC AUTO-ENTMCNC: 30.8 % (ref 32–36)
MCV RBC AUTO: 81.3 FL (ref 78–100)
MONOCYTES ABSOLUTE: 0.2 K/CU MM
MONOCYTES RELATIVE PERCENT: 1.7 % (ref 0–4)
NUCLEATED RBC %: 0.2 %
PDW BLD-RTO: 21.5 % (ref 11.7–14.9)
PHOSPHORUS: 5.7 MG/DL (ref 2.5–4.9)
PLATELET # BLD: 284 K/CU MM (ref 140–440)
PMV BLD AUTO: 11.1 FL (ref 7.5–11.1)
POTASSIUM SERPL-SCNC: 4.1 MMOL/L (ref 3.5–5.1)
PROCALCITONIN: 3.34
PROTHROMBIN TIME: 23.9 SECONDS (ref 11.7–14.5)
RBC # BLD: 3.68 M/CU MM (ref 4.6–6.2)
SEGMENTED NEUTROPHILS ABSOLUTE COUNT: 10.7 K/CU MM
SEGMENTED NEUTROPHILS RELATIVE PERCENT: 93.9 % (ref 36–66)
SODIUM BLD-SCNC: 123 MMOL/L (ref 135–145)
TOTAL IMMATURE NEUTOROPHIL: 0.09 K/CU MM
TOTAL NUCLEATED RBC: 0 K/CU MM
WBC # BLD: 11.4 K/CU MM (ref 4–10.5)

## 2020-01-27 PROCEDURE — 99232 SBSQ HOSP IP/OBS MODERATE 35: CPT | Performed by: INTERNAL MEDICINE

## 2020-01-27 PROCEDURE — 84145 PROCALCITONIN (PCT): CPT

## 2020-01-27 PROCEDURE — 85610 PROTHROMBIN TIME: CPT

## 2020-01-27 PROCEDURE — 6360000002 HC RX W HCPCS: Performed by: INTERNAL MEDICINE

## 2020-01-27 PROCEDURE — 85025 COMPLETE CBC W/AUTO DIFF WBC: CPT

## 2020-01-27 PROCEDURE — 86141 C-REACTIVE PROTEIN HS: CPT

## 2020-01-27 PROCEDURE — 6370000000 HC RX 637 (ALT 250 FOR IP): Performed by: STUDENT IN AN ORGANIZED HEALTH CARE EDUCATION/TRAINING PROGRAM

## 2020-01-27 PROCEDURE — 2060000000 HC ICU INTERMEDIATE R&B

## 2020-01-27 PROCEDURE — 36592 COLLECT BLOOD FROM PICC: CPT

## 2020-01-27 PROCEDURE — 80069 RENAL FUNCTION PANEL: CPT

## 2020-01-27 PROCEDURE — 99233 SBSQ HOSP IP/OBS HIGH 50: CPT | Performed by: INTERNAL MEDICINE

## 2020-01-27 PROCEDURE — 90935 HEMODIALYSIS ONE EVALUATION: CPT

## 2020-01-27 PROCEDURE — 85049 AUTOMATED PLATELET COUNT: CPT

## 2020-01-27 PROCEDURE — 6370000000 HC RX 637 (ALT 250 FOR IP): Performed by: INTERNAL MEDICINE

## 2020-01-27 PROCEDURE — 2580000003 HC RX 258: Performed by: INTERNAL MEDICINE

## 2020-01-27 PROCEDURE — 94640 AIRWAY INHALATION TREATMENT: CPT

## 2020-01-27 PROCEDURE — 2500000003 HC RX 250 WO HCPCS: Performed by: INTERNAL MEDICINE

## 2020-01-27 PROCEDURE — 85730 THROMBOPLASTIN TIME PARTIAL: CPT

## 2020-01-27 PROCEDURE — 94761 N-INVAS EAR/PLS OXIMETRY MLT: CPT

## 2020-01-27 PROCEDURE — 2700000000 HC OXYGEN THERAPY PER DAY

## 2020-01-27 RX ORDER — TENOFOVIR DISOPROXIL FUMARATE 300 MG/1
300 TABLET, FILM COATED ORAL
Status: DISCONTINUED | OUTPATIENT
Start: 2020-01-27 | End: 2020-02-06 | Stop reason: HOSPADM

## 2020-01-27 RX ORDER — LAMIVUDINE 150 MG/1
150 TABLET, FILM COATED ORAL ONCE
Status: COMPLETED | OUTPATIENT
Start: 2020-01-27 | End: 2020-01-27

## 2020-01-27 RX ORDER — SULFAMETHOXAZOLE AND TRIMETHOPRIM 400; 80 MG/1; MG/1
1 TABLET ORAL DAILY
Status: DISCONTINUED | OUTPATIENT
Start: 2020-01-27 | End: 2020-01-29

## 2020-01-27 RX ORDER — LAMIVUDINE 150 MG/1
150 TABLET, FILM COATED ORAL ONCE
Status: DISCONTINUED | OUTPATIENT
Start: 2020-01-27 | End: 2020-01-27

## 2020-01-27 RX ORDER — HEPARIN SODIUM 1000 [USP'U]/ML
2600 INJECTION, SOLUTION INTRAVENOUS; SUBCUTANEOUS
Status: COMPLETED | OUTPATIENT
Start: 2020-01-27 | End: 2020-01-27

## 2020-01-27 RX ADMIN — EPOETIN ALFA-EPBX 3000 UNITS: 3000 INJECTION, SOLUTION INTRAVENOUS; SUBCUTANEOUS at 20:36

## 2020-01-27 RX ADMIN — METRONIDAZOLE 500 MG: 500 INJECTION, SOLUTION INTRAVENOUS at 05:00

## 2020-01-27 RX ADMIN — HEPARIN SODIUM 2600 UNITS: 1000 INJECTION, SOLUTION INTRAVENOUS; SUBCUTANEOUS at 21:17

## 2020-01-27 RX ADMIN — METOPROLOL TARTRATE 50 MG: 50 TABLET, FILM COATED ORAL at 23:19

## 2020-01-27 RX ADMIN — DOLUTEGRAVIR SODIUM 50 MG: 50 TABLET, FILM COATED ORAL at 23:20

## 2020-01-27 RX ADMIN — IPRATROPIUM BROMIDE AND ALBUTEROL SULFATE 1 AMPULE: .5; 3 SOLUTION RESPIRATORY (INHALATION) at 00:44

## 2020-01-27 RX ADMIN — DIPHENHYDRAMINE HYDROCHLORIDE 50 MG: 25 TABLET ORAL at 23:20

## 2020-01-27 RX ADMIN — LAMIVUDINE 150 MG: 150 TABLET, FILM COATED ORAL at 23:20

## 2020-01-27 RX ADMIN — HEPARIN SODIUM AND DEXTROSE 14 UNITS/KG/HR: 10000; 5 INJECTION INTRAVENOUS at 15:59

## 2020-01-27 RX ADMIN — CEFEPIME HYDROCHLORIDE 2 G: 2 INJECTION, POWDER, FOR SOLUTION INTRAVENOUS at 16:01

## 2020-01-27 RX ADMIN — MIDODRINE HYDROCHLORIDE 10 MG: 5 TABLET ORAL at 10:11

## 2020-01-27 RX ADMIN — TENOFOVIR DISOPROXIL FUMARATE 300 MG: 300 TABLET, COATED ORAL at 23:20

## 2020-01-27 RX ADMIN — ATORVASTATIN CALCIUM 40 MG: 40 TABLET, FILM COATED ORAL at 23:19

## 2020-01-27 RX ADMIN — DOXYCYCLINE HYCLATE 100 MG: 100 TABLET, COATED ORAL at 23:19

## 2020-01-27 RX ADMIN — EPOETIN ALFA-EPBX 2000 UNITS: 2000 INJECTION, SOLUTION INTRAVENOUS; SUBCUTANEOUS at 20:37

## 2020-01-27 RX ADMIN — DIPHENHYDRAMINE HYDROCHLORIDE 50 MG: 25 TABLET ORAL at 00:31

## 2020-01-27 ASSESSMENT — PAIN SCALES - GENERAL
PAINLEVEL_OUTOF10: 0

## 2020-01-27 NOTE — PROGRESS NOTES
Discussed with HD RN time pt will be coming downstairs for dialysis. States pt will require a private room, etc. It will be soon continue holding meds at this time.

## 2020-01-27 NOTE — PROGRESS NOTES
01/27/2020    LABALBU 40 01/15/2020    CREATININE 6.7 01/27/2020    CALCIUM 8.3 01/27/2020    GFRAA 11 01/27/2020    LABGLOM 9 01/27/2020    GLUCOSE 100 01/27/2020     Ct Abdomen Pelvis Wo Contrast Additional Contrast? None    Result Date: 1/16/2020  EXAMINATION: CT OF THE ABDOMEN AND PELVIS WITHOUT CONTRAST 1/16/2020 8:10 pm TECHNIQUE: CT of the abdomen and pelvis was performed without the administration of intravenous contrast. Multiplanar reformatted images are provided for review. Dose modulation, iterative reconstruction, and/or weight based adjustment of the mA/kV was utilized to reduce the radiation dose to as low as reasonably achievable. COMPARISON: None. HISTORY: ORDERING SYSTEM PROVIDED HISTORY: evalaute anemia and renal failure TECHNOLOGIST PROVIDED HISTORY: Reason for exam:->evalaute anemia and renal failure Additional Contrast?->None Reason for Exam: evalaute anemia and renal failure, cough Acuity: Unknown Type of Exam: Initial Additional signs and symptoms: none Relevant Medical/Surgical History: no sx FINDINGS: Lower Chest: Mild patchy ground-glass opacity in the right lower lobe with trace layering right pleural effusion. Left lung base clear. Organs: Limited evaluation due lack of intravenous contrast.  Liver, gallbladder, biliary system, pancreas, spleen, adrenal glands, and right kidney are unremarkable. There is a punctate nonobstructing calculus in the otherwise unremarkable left kidney. There is no hydronephrosis or perinephric stranding. GI/Bowel: There is wall thickening of the descending and transverse portions of the duodenum with surrounding inflammatory stranding suboptimally evaluated due to lack of intravenous contrast.  The bowel is otherwise unremarkable. .  The appendix is normal. Pelvis: The urinary bladder is unremarkable. There is no pelvic mass. Peritoneum/Retroperitoneum: Small amount of free fluid in the pelvic cavity. No free air or focal fluid collection.   Mildly enlarged consolidation. Us Retroperitoneal Complete    Result Date: 1/15/2020  EXAMINATION: RETROPERITONEAL ULTRASOUND OF THE KIDNEYS AND URINARY BLADDER 1/15/2020 COMPARISON: None HISTORY: ORDERING SYSTEM PROVIDED HISTORY: DWIGHT; please characterize kidneys and screen for obstruction TECHNOLOGIST PROVIDED HISTORY: Reason for exam:->DWIGHT; please characterize kidneys and screen for obstruction Reason for Exam: DWIGHT Acuity: Acute Type of Exam: Initial Additional signs and symptoms: nk Relevant Medical/Surgical History: nk FINDINGS: Kidneys: The right kidney measures 13 cm in length and the left kidney measures 13 cm in length. The kidneys are hyperechoic. There is no hydronephrosis or shadowing stone. Bladder: The urinary bladder is normal and incompletely distended. No hydronephrosis. Ir Nontunneled Vascular Catheter > 5 Years    Result Date: 1/15/2020  PROCEDURE: ULTRASOUND GUIDED VASCULAR ACCESS. FLUOROSCOPY GUIDED PLACEMENT OF A NON-TUNNELED CATHETER. 1/15/2020. HISTORY: ORDERING SYSTEM PROVIDED HISTORY: Renal insufficiency TECHNOLOGIST PROVIDED HISTORY: Reason for exam:->temp cath Acute renal failure, needs dialysis SEDATION: None FLUOROSCOPY DOSE AND TYPE OR TIME AND EXPOSURES: 0.5 minutes, air kerma 8 TECHNIQUE: Informed consent was obtained after a detailed explanation of the procedure including risks, benefits, and alternatives. Universal protocol was observed. The right neck and chest were prepped and draped in sterile fashion using maximum sterile barrier technique. Local anesthesia was achieved with lidocaine. A micropuncture needle was used to access the right internal jugular vein using ultrasound guidance. An ultrasound image demonstrating patency of the vein with needle tip located within it. An image was obtained and stored in PACs. A 0.035 guidewire was used to place a none tunneled triple-lumen HD catheter using fluoroscopic guidance with tip in the right atrium after fascial tract dilation.  The catheter flushed easily and there was a good blood return. The catheter was sutured to the skin. The catheter was locked with heparinized saline. The patient tolerated the procedure well and there were no immediate complications. EBL: Less than 5 cc FINDINGS: Fluoroscopic image demonstrates the tip of the catheter in the right atrium. Successful ultrasound and fluoroscopy guided non-tunneled HD catheter placement. Catheter is okay for use. Assessment and Plan:   Nunu Rodrigues is a 45 y.o.  male  who presents with on and off fever    Acute hypoxic respiratory failure secondary to bilateral pneumonia and pulmonary embolism-improving  Extensive airspace disease per CT chest  Status post bronchoscopy, Samples sent  On treatment with Bactrim for possible PJP pneumonia   ID following, on prednisone, cefepime and doxycycline  ID started on treatment for HIV, started on tenofovir once weekly, lamivudine daily and dolutegravir daily. 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 Continue RRT    Hypothermia-resolved    Newly diagnosed HIV infection  HIV viral load, genotype pending  CD4 coun 20. Treatment as above    Hypertension-continue metoprolol    Seems to be very depressed with his diagnosis.   Will seek counseling and consult psychiatry. Diet Dietary Nutrition Supplements: Renal Oral Supplement  DIET RENAL;  Diet NPO, After Midnight   DVT Prophylaxis [] Lovenox, [x]  Heparin, [] SCDs, [] Ambulation   GI Prophylaxis [x] PPI,  [] H2 Blocker,  [] Carafate,  [] Diet/Tube Feeds   Code Status Full Code   Disposition Patient requires continued admission due to respiratory failure. Multiple barriers with discharge. MDM [] Low, [x] Moderate,[]  High  Patient's risk as above due to multiple conditions including respiratory failure, kidney failure.       Electronically signed by Mark Hall MD on 1/27/2020 at 4:43 PM

## 2020-01-27 NOTE — PROGRESS NOTES
Pt states meds did not alleviate SOB. Applied 2L NC for comfort. Pt is resting in bed in no distress.

## 2020-01-27 NOTE — PLAN OF CARE
Problem: Fluid Volume:  Goal: Hemodynamic stability will improve  Description  Hemodynamic stability will improve  1/26/2020 2247 by Socorro Silva RN  Outcome: Ongoing  1/26/2020 0949 by Melanie Barron RN  Outcome: Ongoing  Goal: Ability to maintain a balanced intake and output will improve  Description  Ability to maintain a balanced intake and output will improve  1/26/2020 2247 by Socorro Silva RN  Outcome: Ongoing  1/26/2020 0949 by Melanie Barron RN  Outcome: Ongoing  Goal: Ability to achieve a balanced intake and output will improve  Description  Ability to achieve a balanced intake and output will improve  1/26/2020 2247 by Socorro Silva RN  Outcome: Ongoing  1/26/2020 0949 by Melanie Barron RN  Outcome: Ongoing     Problem: Health Behavior:  Goal: Identification of resources available to assist in meeting health care needs will improve  Description  Identification of resources available to assist in meeting health care needs will improve  1/26/2020 2247 by Socorro Silva RN  Outcome: Ongoing  1/26/2020 0949 by Melanie Barron RN  Outcome: Ongoing     Problem: Respiratory:  Goal: Respiratory status will improve  Description  Respiratory status will improve  1/26/2020 2247 by Socorro Silva RN  Outcome: Ongoing  1/26/2020 0949 by Melanie Barron RN  Outcome: Ongoing     Problem: Physical Regulation:  Goal: Ability to maintain clinical measurements within normal limits will improve  Description  Ability to maintain clinical measurements within normal limits will improve  1/26/2020 2247 by Socorro Silva RN  Outcome: Ongoing  1/26/2020 0949 by Melanie Barron RN  Outcome: Ongoing  Goal: Will show no signs and symptoms of electrolyte imbalance  Description  Will show no signs and symptoms of electrolyte imbalance  1/26/2020 2247 by Socorro Silva RN  Outcome: Ongoing  1/26/2020 0949 by Melanie Barron RN  Outcome: Ongoing     Problem:  Activity:  Goal: Fatigue will decrease  Description  Fatigue will decrease  1/26/2020 2247 by Lexi Louie RN  Outcome: Ongoing  1/26/2020 0949 by Alexander Rowland RN  Outcome: Ongoing  Goal: Risk for activity intolerance will decrease  Description  Risk for activity intolerance will decrease  1/26/2020 2247 by Lexi Louie RN  Outcome: Ongoing  1/26/2020 0949 by Alexander Rowland RN  Outcome: Ongoing     Problem: Coping:  Goal: Ability to cope will improve  Description  Ability to cope will improve  1/26/2020 2247 by Lexi Louie RN  Outcome: Ongoing  1/26/2020 0949 by Alexander Rowland RN  Outcome: Ongoing     Problem: Nutritional:  Goal: Ability to identify appropriate dietary choices will improve  Description  Ability to identify appropriate dietary choices will improve  1/26/2020 2247 by Lexi Louie RN  Outcome: Ongoing  1/26/2020 0949 by Alexander Rowland RN  Outcome: Ongoing     Problem: Falls - Risk of:  Goal: Will remain free from falls  Description  Will remain free from falls  1/26/2020 2247 by Lexi Louie RN  Outcome: Ongoing  1/26/2020 0949 by Alexander Rowland RN  Outcome: Ongoing  Goal: Absence of physical injury  Description  Absence of physical injury  1/26/2020 2247 by Lexi Louie RN  Outcome: Ongoing  1/26/2020 0949 by Alexander Rowland RN  Outcome: Ongoing     Problem: Nutrition  Goal: Optimal nutrition therapy  1/26/2020 2247 by Lexi Louie RN  Outcome: Ongoing  1/26/2020 0949 by Alexander Rowland RN  Outcome: Ongoing     Problem: Pain:  Goal: Pain level will decrease  Description  Pain level will decrease  1/26/2020 2247 by Lexi Louie RN  Outcome: Ongoing  1/26/2020 0949 by Alexander Rowland RN  Outcome: Ongoing  Goal: Control of acute pain  Description  Control of acute pain  1/26/2020 2247 by Lexi Louie RN  Outcome: Ongoing  1/26/2020 0949 by Alexander Rowland RN  Outcome: Ongoing  Goal: Control of chronic pain  Description  Control of chronic pain  1/26/2020 2247 by Lexi Louie RN  Outcome: Ongoing  1/26/2020 0949 by Dontrell Palencia RN  Outcome: Ongoing

## 2020-01-27 NOTE — PROGRESS NOTES
Pt c/o SOB, hicupping. Pt appears to be anxious despite dose of Vistaril with HS meds. Pt O2 sat 98% room air, no acute distress. Gave Benadryl for agitation. Azar Meier, RT in room now to give Lillain.

## 2020-01-27 NOTE — PROGRESS NOTES
Glucose 100 (H) 70 - 99 MG/DL    Calcium 8.3 8.3 - 10.6 MG/DL    GFR Non- 9 (L) >60 mL/min/1.73m2    GFR  11 (L) >60 mL/min/1.73m2    Alb 2.8 (L) 3.4 - 5.0 GM/DL    Phosphorus 5.7 (H) 2.5 - 4.9 MG/DL   APTT    Collection Time: 01/27/20  6:20 AM   Result Value Ref Range    aPTT 85.4 (H) 25.1 - 37.1 SECONDS   Protime-INR    Collection Time: 01/27/20  6:20 AM   Result Value Ref Range    Protime 23.9 (H) 11.7 - 14.5 SECONDS    INR 1.96 INDEX   Procalcitonin    Collection Time: 01/27/20  6:20 AM   Result Value Ref Range    Procalcitonin 3.34    C-Reactive Protein    Collection Time: 01/27/20  6:20 AM   Result Value Ref Range    CRP, High Sensitivity 55.7 mg/L     CULTURE results: Invalid input(s): BLOOD CULTURE,  URINE CULTURE, SURGICAL CULTURE    Diagnosis:  Patient Active Problem List   Diagnosis    Acute renal failure (HCC)    Elevated serum creatinine       Active Problems  Active Problems:    Acute renal failure (HCC)    Elevated serum creatinine  Resolved Problems:    * No resolved hospital problems.  *    Electronically signed by: Electronically signed by Aishwarya Rivera MD on 1/27/2020 at 10:24 AM

## 2020-01-27 NOTE — PROGRESS NOTES
Discussed with HD RN about time will be coming down for dialysis. States will be around noon and to continue holding PO meds at this time.

## 2020-01-28 PROBLEM — E43 SEVERE MALNUTRITION (HCC): Chronic | Status: ACTIVE | Noted: 2020-01-28

## 2020-01-28 LAB
ALBUMIN SERPL-MCNC: 2.4 GM/DL (ref 3.4–5)
ANION GAP SERPL CALCULATED.3IONS-SCNC: 15 MMOL/L (ref 4–16)
APTT: 105 SECONDS (ref 25.1–37.1)
APTT: 128 SECONDS (ref 25.1–37.1)
APTT: 72.5 SECONDS (ref 25.1–37.1)
BASOPHILS ABSOLUTE: 0 K/CU MM
BASOPHILS RELATIVE PERCENT: 0 % (ref 0–1)
BUN BLDV-MCNC: 31 MG/DL (ref 6–23)
CALCIUM SERPL-MCNC: 8 MG/DL (ref 8.3–10.6)
CHLORIDE BLD-SCNC: 91 MMOL/L (ref 99–110)
CO2: 24 MMOL/L (ref 21–32)
CREAT SERPL-MCNC: 5.3 MG/DL (ref 0.9–1.3)
DIFFERENTIAL TYPE: ABNORMAL
EOSINOPHILS ABSOLUTE: 0 K/CU MM
EOSINOPHILS RELATIVE PERCENT: 0 % (ref 0–3)
GFR AFRICAN AMERICAN: 14 ML/MIN/1.73M2
GFR NON-AFRICAN AMERICAN: 12 ML/MIN/1.73M2
GLUCOSE BLD-MCNC: 95 MG/DL (ref 70–99)
HCT VFR BLD CALC: 27.5 % (ref 42–52)
HEMOGLOBIN: 8.5 GM/DL (ref 13.5–18)
IMMATURE NEUTROPHIL %: 0.8 % (ref 0–0.43)
INR BLD: 2.41 INDEX
INR BLD: 2.73 INDEX
LYMPHOCYTES ABSOLUTE: 0.2 K/CU MM
LYMPHOCYTES RELATIVE PERCENT: 2.2 % (ref 24–44)
MCH RBC QN AUTO: 25.4 PG (ref 27–31)
MCHC RBC AUTO-ENTMCNC: 30.9 % (ref 32–36)
MCV RBC AUTO: 82.1 FL (ref 78–100)
MONOCYTES ABSOLUTE: 0.2 K/CU MM
MONOCYTES RELATIVE PERCENT: 2 % (ref 0–4)
NUCLEATED RBC %: 0 %
PDW BLD-RTO: 21.8 % (ref 11.7–14.9)
PHOSPHORUS: 4.9 MG/DL (ref 2.5–4.9)
PLATELET # BLD: 179 K/CU MM (ref 140–440)
PMV BLD AUTO: 10.8 FL (ref 7.5–11.1)
POTASSIUM SERPL-SCNC: 4.3 MMOL/L (ref 3.5–5.1)
PROTHROMBIN TIME: 29.4 SECONDS (ref 11.7–14.5)
PROTHROMBIN TIME: 33.4 SECONDS (ref 11.7–14.5)
RBC # BLD: 3.35 M/CU MM (ref 4.6–6.2)
SEGMENTED NEUTROPHILS ABSOLUTE COUNT: 7.9 K/CU MM
SEGMENTED NEUTROPHILS RELATIVE PERCENT: 95 % (ref 36–66)
SODIUM BLD-SCNC: 130 MMOL/L (ref 135–145)
TOTAL IMMATURE NEUTOROPHIL: 0.07 K/CU MM
TOTAL NUCLEATED RBC: 0 K/CU MM
WBC # BLD: 8.3 K/CU MM (ref 4–10.5)

## 2020-01-28 PROCEDURE — 6370000000 HC RX 637 (ALT 250 FOR IP): Performed by: NURSE PRACTITIONER

## 2020-01-28 PROCEDURE — P9017 PLASMA 1 DONOR FRZ W/IN 8 HR: HCPCS

## 2020-01-28 PROCEDURE — 2580000003 HC RX 258: Performed by: STUDENT IN AN ORGANIZED HEALTH CARE EDUCATION/TRAINING PROGRAM

## 2020-01-28 PROCEDURE — 6370000000 HC RX 637 (ALT 250 FOR IP): Performed by: INTERNAL MEDICINE

## 2020-01-28 PROCEDURE — 80069 RENAL FUNCTION PANEL: CPT

## 2020-01-28 PROCEDURE — C9113 INJ PANTOPRAZOLE SODIUM, VIA: HCPCS | Performed by: INTERNAL MEDICINE

## 2020-01-28 PROCEDURE — 85730 THROMBOPLASTIN TIME PARTIAL: CPT

## 2020-01-28 PROCEDURE — 94761 N-INVAS EAR/PLS OXIMETRY MLT: CPT

## 2020-01-28 PROCEDURE — 85610 PROTHROMBIN TIME: CPT

## 2020-01-28 PROCEDURE — 2060000000 HC ICU INTERMEDIATE R&B

## 2020-01-28 PROCEDURE — 36430 TRANSFUSION BLD/BLD COMPNT: CPT

## 2020-01-28 PROCEDURE — 80053 COMPREHEN METABOLIC PANEL: CPT

## 2020-01-28 PROCEDURE — 6360000002 HC RX W HCPCS: Performed by: INTERNAL MEDICINE

## 2020-01-28 PROCEDURE — 76937 US GUIDE VASCULAR ACCESS: CPT

## 2020-01-28 PROCEDURE — 86927 PLASMA FRESH FROZEN: CPT

## 2020-01-28 PROCEDURE — 99232 SBSQ HOSP IP/OBS MODERATE 35: CPT | Performed by: INTERNAL MEDICINE

## 2020-01-28 PROCEDURE — 2580000003 HC RX 258: Performed by: INTERNAL MEDICINE

## 2020-01-28 PROCEDURE — 85025 COMPLETE CBC W/AUTO DIFF WBC: CPT

## 2020-01-28 PROCEDURE — 6360000002 HC RX W HCPCS: Performed by: HOSPITALIST

## 2020-01-28 PROCEDURE — 6360000002 HC RX W HCPCS: Performed by: NURSE PRACTITIONER

## 2020-01-28 RX ORDER — 0.9 % SODIUM CHLORIDE 0.9 %
20 INTRAVENOUS SOLUTION INTRAVENOUS ONCE
Status: COMPLETED | OUTPATIENT
Start: 2020-01-28 | End: 2020-01-28

## 2020-01-28 RX ORDER — HEPARIN SODIUM 10000 [USP'U]/100ML
18 INJECTION, SOLUTION INTRAVENOUS CONTINUOUS
Status: DISCONTINUED | OUTPATIENT
Start: 2020-01-28 | End: 2020-01-28

## 2020-01-28 RX ORDER — HEPARIN SODIUM 1000 [USP'U]/ML
40 INJECTION, SOLUTION INTRAVENOUS; SUBCUTANEOUS PRN
Status: DISCONTINUED | OUTPATIENT
Start: 2020-01-28 | End: 2020-01-29

## 2020-01-28 RX ORDER — HEPARIN SODIUM 10000 [USP'U]/100ML
18 INJECTION, SOLUTION INTRAVENOUS CONTINUOUS
Status: DISCONTINUED | OUTPATIENT
Start: 2020-01-28 | End: 2020-01-29

## 2020-01-28 RX ORDER — LORAZEPAM 2 MG/ML
0.5 INJECTION INTRAMUSCULAR ONCE
Status: COMPLETED | OUTPATIENT
Start: 2020-01-28 | End: 2020-01-28

## 2020-01-28 RX ORDER — HEPARIN SODIUM 1000 [USP'U]/ML
80 INJECTION, SOLUTION INTRAVENOUS; SUBCUTANEOUS PRN
Status: DISCONTINUED | OUTPATIENT
Start: 2020-01-28 | End: 2020-01-29

## 2020-01-28 RX ORDER — LAMIVUDINE 10 MG/ML
50 SOLUTION ORAL DAILY
Status: DISCONTINUED | OUTPATIENT
Start: 2020-01-28 | End: 2020-01-31

## 2020-01-28 RX ORDER — PHYTONADIONE 10 MG/ML
10 INJECTION, EMULSION INTRAMUSCULAR; INTRAVENOUS; SUBCUTANEOUS ONCE
Status: COMPLETED | OUTPATIENT
Start: 2020-01-28 | End: 2020-01-28

## 2020-01-28 RX ADMIN — MIDODRINE HYDROCHLORIDE 10 MG: 5 TABLET ORAL at 13:06

## 2020-01-28 RX ADMIN — DOLUTEGRAVIR SODIUM 50 MG: 50 TABLET, FILM COATED ORAL at 18:33

## 2020-01-28 RX ADMIN — CALCITRIOL CAPSULES 0.25 MCG 0.5 MCG: 0.25 CAPSULE ORAL at 11:19

## 2020-01-28 RX ADMIN — SEVELAMER CARBONATE 800 MG: 800 TABLET, FILM COATED ORAL at 13:05

## 2020-01-28 RX ADMIN — CALCIUM ACETATE 1334 MG: 667 CAPSULE ORAL at 13:06

## 2020-01-28 RX ADMIN — SEVELAMER CARBONATE 800 MG: 800 TABLET, FILM COATED ORAL at 09:50

## 2020-01-28 RX ADMIN — ACETAMINOPHEN 650 MG: 325 TABLET ORAL at 13:31

## 2020-01-28 RX ADMIN — SODIUM CHLORIDE, PRESERVATIVE FREE 10 ML: 5 INJECTION INTRAVENOUS at 21:09

## 2020-01-28 RX ADMIN — Medication 50 MG: at 18:30

## 2020-01-28 RX ADMIN — DOXYCYCLINE HYCLATE 100 MG: 100 TABLET, COATED ORAL at 09:50

## 2020-01-28 RX ADMIN — PHYTONADIONE 10 MG: 10 INJECTION, EMULSION INTRAMUSCULAR; INTRAVENOUS; SUBCUTANEOUS at 09:51

## 2020-01-28 RX ADMIN — MIDODRINE HYDROCHLORIDE 10 MG: 5 TABLET ORAL at 18:30

## 2020-01-28 RX ADMIN — SODIUM CHLORIDE, PRESERVATIVE FREE 10 ML: 5 INJECTION INTRAVENOUS at 09:54

## 2020-01-28 RX ADMIN — METOPROLOL TARTRATE 50 MG: 50 TABLET, FILM COATED ORAL at 20:59

## 2020-01-28 RX ADMIN — MIDODRINE HYDROCHLORIDE 10 MG: 5 TABLET ORAL at 09:49

## 2020-01-28 RX ADMIN — PANTOPRAZOLE SODIUM 40 MG: 40 INJECTION, POWDER, FOR SOLUTION INTRAVENOUS at 09:49

## 2020-01-28 RX ADMIN — HEPARIN SODIUM AND DEXTROSE 11.05 UNITS/KG/HR: 10000; 5 INJECTION INTRAVENOUS at 14:59

## 2020-01-28 RX ADMIN — ATORVASTATIN CALCIUM 40 MG: 40 TABLET, FILM COATED ORAL at 20:59

## 2020-01-28 RX ADMIN — SULFAMETHOXAZOLE AND TRIMETHOPRIM 1 TABLET: 400; 80 TABLET ORAL at 10:07

## 2020-01-28 RX ADMIN — SEVELAMER CARBONATE 800 MG: 800 TABLET, FILM COATED ORAL at 18:29

## 2020-01-28 RX ADMIN — CALCIUM ACETATE 1334 MG: 667 CAPSULE ORAL at 18:29

## 2020-01-28 RX ADMIN — METOPROLOL TARTRATE 50 MG: 50 TABLET, FILM COATED ORAL at 09:49

## 2020-01-28 RX ADMIN — LORAZEPAM 0.5 MG: 2 INJECTION INTRAMUSCULAR; INTRAVENOUS at 21:09

## 2020-01-28 RX ADMIN — DOXYCYCLINE HYCLATE 100 MG: 100 TABLET, COATED ORAL at 20:59

## 2020-01-28 RX ADMIN — CALCIUM ACETATE 1334 MG: 667 CAPSULE ORAL at 09:50

## 2020-01-28 RX ADMIN — SODIUM CHLORIDE 20 ML: 9 INJECTION, SOLUTION INTRAVENOUS at 16:35

## 2020-01-28 ASSESSMENT — PAIN SCALES - GENERAL
PAINLEVEL_OUTOF10: 0

## 2020-01-28 NOTE — PROGRESS NOTES
Called and spoke with Fred Deshpande and requested he change patients weight on heparin gtt and told him that Dr. Andrade Carlton is okay with heparin gtt and aptt needs to be completed every 6 hours.

## 2020-01-28 NOTE — PROGRESS NOTES
per 24 hour   Intake 510 ml   Output 3650 ml   Net -3140 ml     CBC:   Recent Labs     01/26/20  0555 01/27/20  0620 01/28/20  0630   WBC 7.9 11.4* 8.3   HGB 8.0* 9.2* 8.5*    284 179     BMP:    Recent Labs     01/26/20  0555 01/27/20  0620 01/28/20  0630   * 123* 130*   K 4.0 4.1 4.3   CL 89* 85* 91*   CO2 25 22 24   BUN 37* 51* 31*   CREATININE 5.7* 6.7* 5.3*   GLUCOSE 89 100* 95     Hepatic:   No results for input(s): AST, ALT, ALB, BILITOT, ALKPHOS in the last 72 hours. Troponin: No results for input(s): TROPONINI in the last 72 hours. BNP: No results for input(s): BNP in the last 72 hours. Lipids: No results for input(s): CHOL, HDL in the last 72 hours.     Invalid input(s): LDLCALCU  ABGs:   Lab Results   Component Value Date    PO2ART 77 01/19/2020    MIR1AOO 39.0 01/19/2020     INR:   Recent Labs     01/26/20  0555 01/27/20  0620 01/28/20  0630   INR 1.21 1.96 2.73     Renal Labs  Albumin:    Lab Results   Component Value Date    LABALBU 2.4 01/28/2020    LABALBU 40 01/15/2020     Calcium:    Lab Results   Component Value Date    CALCIUM 8.0 01/28/2020     Phosphorus:    Lab Results   Component Value Date    PHOS 4.9 01/28/2020     U/A:    Lab Results   Component Value Date    NITRU NEGATIVE 01/15/2020    COLORU YELLOW 01/15/2020    WBCUA 2 01/15/2020    RBCUA 1 01/15/2020    MUCUS RARE 01/15/2020    TRICHOMONAS NONE SEEN 01/15/2020    BACTERIA NEGATIVE 01/15/2020    CLARITYU CLEAR 01/15/2020    SPECGRAV 1.024 01/15/2020    UROBILINOGEN NORMAL 01/15/2020    BILIRUBINUR NEGATIVE 01/15/2020    BLOODU NEGATIVE 01/15/2020    KETUA SMALL 01/15/2020     ABG:    Lab Results   Component Value Date    CRF8ZGN 39.0 01/19/2020    PO2ART 77 01/19/2020    MMO0KAD 26.5 01/19/2020     HgBA1c:    Lab Results   Component Value Date    LABA1C 6.1 01/15/2020     Microalbumen/Creatinine ratio:  No components found for: RUCREAT          Objective:   Vitals: /86   Pulse 80   Temp 98.3 °F (36.8 °C) (Oral) Resp 21   Ht 5' 2\" (1.575 m)   Wt 153 lb 11.2 oz (69.7 kg)   SpO2 100%   BMI 28.11 kg/m²   General appearance: awake weak  HEENT: Head: Normal, normocephalic, atraumatic. Neck: supple, symmetrical, trachea midline  Lungs: diminished breath sounds bilaterally  Heart: S1, S2 normal  Abdomen: abnormal findings:  soft nt  Extremities: edema trace hd catheter  Neurologic: Mental status: alertness: awake alert      Patient Active Problem List:     Acute renal failure (HCC)    Assessment and Plan:      IMP:  esrd from hivan  Hyperkalemia/hyponatremia  Nephrotic syndrome/HIVAN  PE with sob  Anemia  hiv +  Hep B prior infection  hypotension      Plan     1 hd in am and as inr increase will try do hd tunnel catheter in am and give ffp and vit K today.  Not able set up for outpt dialysis due to insurance and will need come as need to hospital for dialysis need  2 K and na stable with dialysis  3 on coumadin and heparin and coumadin hold for line and need hold heparin 6 hr for catheter tomorrow  4 monitor hb  5 maintain hiv meds per ID  6 fu gi with hep B outpt  7 bp stable and wbc stable  8 negative for TB  When has tunnel hd and inr > 2 can plan for dc later in week  If stay local then go [facility] and [facility] for dialysis and if cousin does come from [place] then he may return to [place] for his care  Will follow           Rick Maharaj MD

## 2020-01-28 NOTE — PROGRESS NOTES
Lex served Dr. Elva Gill and told him that we are waiting for another line because patient only line is transfusing heparin and needs another for plasma. PICC team was consulted stat for an IV. Waiting for a response.

## 2020-01-28 NOTE — PROGRESS NOTES
Called and spoke with Ruperto Kebede from pharmacy and requested if he thought patients plasma can be transfused with heparin. I told him patient has one access and cannot get a line in patient. He stated that he is going to research and call me back soon.

## 2020-01-28 NOTE — FLOWSHEET NOTE
Pt has emotional distress over condition. Spoke of wanting to find a [employer] and possible becoming a . Would like to speak to  who speaks Icelandic.

## 2020-01-28 NOTE — PROGRESS NOTES
Infectious Disease Progress Note  2020   Patient Name: Italo Del Rio. : 1975   Impression  · AIDS-advanced HIV  ? CD4 count 20, HIV viral load: 1.7 million  ? HIV genotype: No significant resistance  ? Plan to treat with Tenofovir disoproxil fumarate (TDF) weekly, lamivudine (3TC) and dolutegravir (DTG) daily, after TB and other OIs (opportunistic infections) are ruled out  · Bilateral pneumonia with hypoxic respiratory failure  ? Afebrile, oxygen requirements are not increasing. ? S/p bronchoscopy with BAL AFB negative  ? Etiology of pneumonia remains unknown, but pct is downtrending suggesting a bacterial pneumonia  ? Procalcitonin continues to rise: ? Mycobacterium  ? Fungitell negative, PJP unlikely   ? Toxoplasma IgG negative  ? Other fungal work up negative. · Pulmonary Embolism- right pulmonary artery  ? Remains on heparin drip  · Acute kidney injury with ATN  ? Now on intermittent HD. ? S/p left renal biopsy with CT showing pararenal fluid likely blood/hematoma  · Past hepatitis B infection  ? Hepatitis B surface antibody positive, anti-HB C antibody positive, hepatitis B surface antigen negative  ? Hepatitis C antibody negative  · Multi-morbidity: per PMHx  Plan:  · Continue cefepime, doxycycline through 2020   · Trend pct. · Continue  Bactrim dose to SS daily  for primary ppx  · D/c prednisone and metronidazole  · Continue   mg weekly (post HD), 3TC 150 mg once and then 50 mg daily  and DTG 50 mg daily   · F/u BAL toxoplasma PCR, AFB, fungal, bacterial culture      Ongoing Antimicrobial Therapy  Cefepime   Doxycycline   Bactrim    TDF   3TC   DTG     Completed Antimicrobial Therapy  Vancomycin -  Metronidazole ? History:? Interval history noted, bilateral pneumonia  No fever, chills, nausea, or vomiting.    Physical Exam:  Vital Signs: /88   Pulse 83   Temp 98.3 °F (36.8 °C) (Oral)   Resp 29   Ht 5' 2\" (1.575 m)   Wt 153 lb 11.2 oz (69.7 kg)   SpO2 99%   BMI 28.11 kg/m²     Gen: alert and oriented X3, no distress  Skin: no stigmata of endocarditis  Wounds: C/D/I  HEMT: AT/NC Oropharynx pink, moist, and without lesions or exudates; dentition in good state of repair  Eyes: PERRLA, EOMI, conjunctiva pink, sclera anicteric. Neck: Supple. Trachea midline. No LAD. Chest: no distress and CTA. Good air movement. Heart: RRR and no MRG. Abd: soft, non-distended, no tenderness, no hepatomegaly. Normoactive bowel sounds. Ext: no clubbing, cyanosis, or edema  Catheter Site: without erythema or tenderness  Neuro: Mental status intact. CN 2-12 intact and no focal sensory or motor deficits     Radiologic / Imaging / TESTING  No results found.      Labs:    Recent Results (from the past 24 hour(s))   CBC Auto Differential    Collection Time: 01/28/20  6:30 AM   Result Value Ref Range    WBC 8.3 4.0 - 10.5 K/CU MM    RBC 3.35 (L) 4.6 - 6.2 M/CU MM    Hemoglobin 8.5 (L) 13.5 - 18.0 GM/DL    Hematocrit 27.5 (L) 42 - 52 %    MCV 82.1 78 - 100 FL    MCH 25.4 (L) 27 - 31 PG    MCHC 30.9 (L) 32.0 - 36.0 %    RDW 21.8 (H) 11.7 - 14.9 %    Platelets 864 422 - 113 K/CU MM    MPV 10.8 7.5 - 11.1 FL    Differential Type AUTOMATED DIFFERENTIAL     Segs Relative 95.0 (H) 36 - 66 %    Lymphocytes % 2.2 (L) 24 - 44 %    Monocytes % 2.0 0 - 4 %    Eosinophils % 0.0 0 - 3 %    Basophils % 0.0 0 - 1 %    Segs Absolute 7.9 K/CU MM    Lymphocytes Absolute 0.2 K/CU MM    Monocytes Absolute 0.2 K/CU MM    Eosinophils Absolute 0.0 K/CU MM    Basophils Absolute 0.0 K/CU MM    Nucleated RBC % 0.0 %    Total Nucleated RBC 0.0 K/CU MM    Total Immature Neutrophil 0.07 K/CU MM    Immature Neutrophil % 0.8 (H) 0 - 0.43 %   APTT    Collection Time: 01/28/20  6:30 AM   Result Value Ref Range    aPTT 128.0 (H) 25.1 - 37.1 SECONDS   Protime-INR    Collection Time: 01/28/20  6:30 AM   Result Value Ref Range    Protime 33.4 (H) 11.7 - 14.5 SECONDS    INR 2.73 INDEX     CULTURE

## 2020-01-28 NOTE — PLAN OF CARE
Problem: Fluid Volume:  Description  [TRUNCATED] Hyponatremia indicates a relatively greater amount of water to sodium in plasma. In hypovolemia, a deficit of both total body water and sodium exists but relatively less water deficit. Euvolemia represents near normal total body sodium co .. Marleny Meres [TRUNCATED] Hyponatremia indicates a relatively greater amount of water to sodium in plasma. In hypovolemia, a deficit of both total body water and sodium exists but relatively less water deficit. Euvolemia represents near normal total body sodium co ... Goal: Hemodynamic stability will improve  Outcome: Ongoing  Goal: Ability to maintain a balanced intake and output will improve  Outcome: Ongoing  Goal: Ability to achieve a balanced intake and output will improve  Outcome: Ongoing     Problem: Health Behavior:  Goal: Identification of resources available to assist in meeting health care needs will improve  Outcome: Ongoing  Goal: Ability to identify and utilize available support systems will improve  Outcome: Ongoing     Problem: Respiratory:  Goal: Respiratory status will improve  Outcome: Ongoing     Problem: Physical Regulation:  Goal: Ability to maintain clinical measurements within normal limits will improve  Outcome: Ongoing  Goal: Will show no signs and symptoms of electrolyte imbalance  Outcome: Ongoing     Problem:  Activity:  Goal: Fatigue will decrease  Outcome: Ongoing  Goal: Risk for activity intolerance will decrease  Outcome: Ongoing     Problem: Coping:  Goal: Ability to cope will improve  Outcome: Ongoing     Problem: Nutritional:  Goal: Ability to identify appropriate dietary choices will improve  Outcome: Ongoing     Problem: Falls - Risk of:  Goal: Will remain free from falls  Outcome: Ongoing  Goal: Absence of physical injury  Outcome: Ongoing     Problem: Nutrition  Goal: Optimal nutrition therapy  Outcome: Ongoing     Problem: Pain:  Description  Pain management should include both nonpharmacologic and

## 2020-01-28 NOTE — PROGRESS NOTES
Shannan Whtie called from blood bank stating patients plasma is ready I told her I would call her back soon for plasma.

## 2020-01-28 NOTE — PROGRESS NOTES
Spoke with patient and don't think patient quite understands his diagnosis and what is outcome is. Asked patient if he fully understood everything that has happen this past two weeks and he was unsure. Asked patient if he would better understand what is going on with him if it was in his language. Patient stated he would like that. Called Dr. Del Tee and asked if he can do interpretation with patient in his language so patient better understands his care and Dr. Del Tee stated that he would be happy to do that for patient but it needs to be tomorrow. Patient is aware that tomorrow he will speak to an  on an ipad to understand what is going on with patient.

## 2020-01-28 NOTE — PROGRESS NOTES
Called and spoke with Lu Pizarro from Pharmacy because of the heparin order placed by Dr. Lauri Gann with Dr. Esvin Yancey and he stated he just wanted to restart heparin gtt and the same order can be placed as before just wants the weight updated and also wants patients aptt done Q6 hours.

## 2020-01-28 NOTE — PROGRESS NOTES
Attempted to joao patient blood and he has a temp of 100.3. Called blood bank and they stated to to send plasma back and that plasma is good for 24 hours and try again later. Called Dr. Srinivas Doran who order patients blood and he stated to hold the plasma for now give tylenol and attempt later when patient is fever free. Also explained that patient stated he is hot and is sweating and doesn't feel like he has a fever.

## 2020-01-28 NOTE — PROGRESS NOTES
Discussed with nephro and will need tunneled HD cath and restart on heparin drip and hold coumadin as want INR to be lower.

## 2020-01-28 NOTE — PLAN OF CARE
Ongoing  1/28/2020 1152 by Minh Wellington RN  Outcome: Ongoing  1/28/2020 0123 by Chayito Magana RN  Outcome: Ongoing  Goal: Absence of physical injury  Description  Absence of physical injury  1/28/2020 1246 by Stacie Quinones RN  Outcome: Ongoing  1/28/2020 1152 by Minh Wellington RN  Outcome: Ongoing  1/28/2020 0123 by Chayito Magana RN  Outcome: Ongoing     Problem: Nutrition  Goal: Optimal nutrition therapy  1/28/2020 1246 by Stacie Quinones RN  Outcome: Ongoing  1/28/2020 1152 by Minh Wellington RN  Outcome: Ongoing  1/28/2020 0123 by Chayito Magana RN  Outcome: Ongoing     Problem: Pain:  Goal: Pain level will decrease  Description  Pain level will decrease  1/28/2020 1246 by Stacie Quinones RN  Outcome: Ongoing  1/28/2020 1152 by Minh Wellington RN  Outcome: Ongoing  1/28/2020 0123 by Chayito Magana RN  Outcome: Ongoing  Goal: Control of acute pain  Description  Control of acute pain  1/28/2020 1246 by Stacie Quinones RN  Outcome: Ongoing  1/28/2020 1152 by Minh Wellington RN  Outcome: Ongoing  1/28/2020 0123 by Chayito Magana RN  Outcome: Ongoing  Goal: Control of chronic pain  Description  Control of chronic pain  1/28/2020 1246 by Stacie Quinones RN  Outcome: Ongoing  1/28/2020 1152 by Minh Wellington RN  Outcome: Ongoing  1/28/2020 0123 by Chayito Magana RN  Outcome: Ongoing

## 2020-01-28 NOTE — PLAN OF CARE
Nutrition Problem: Severe malnutrition, In context of chronic illness  Intervention: Food and/or Nutrient Delivery: Start oral diet, Start ONS  Nutritional Goals: pt will consume greater than 75% of his meals and supplements

## 2020-01-28 NOTE — PROGRESS NOTES
% 100 mL IVPB  1 g Intravenous PRN Dallin Calderon MD   Stopped at 01/21/20 1920    Or    calcium gluconate 2 g in dextrose 5 % 100 mL IVPB  2 g Intravenous PRN Dallin Calderon MD        Or    calcium gluconate 3 g in dextrose 5 % 100 mL IVPB  3 g Intravenous PRN Dallin Calderon MD        Or    calcium gluconate 4 g in dextrose 5 % 90 mL IVPB  4 g Intravenous PRN Dallin Calderon MD        heparin (porcine) injection 5,550 Units  80 Units/kg Intravenous PRN Maura Delcid MD        heparin (porcine) injection 2,780 Units  40 Units/kg Intravenous PRN Maura Delcid MD   2,780 Units at 01/21/20 1931    0.9 % sodium chloride infusion 250 mL  250 mL Intravenous Once URBANO [place] - CNP        LORazepam (ATIVAN) injection 0.5 mg  0.5 mg Intravenous Once Dallin Calderon MD        metoprolol tartrate (LOPRESSOR) tablet 50 mg  50 mg Oral BID Alessandro Cancino MD   50 mg at 01/27/20 2319    calcium acetate (PHOSLO) capsule 1,334 mg  1,334 mg Oral TID  Moses Landis MD   1,334 mg at 01/26/20 1711    sevelamer (RENVELA) tablet 800 mg  800 mg Oral TID  Moses Landis MD   800 mg at 01/26/20 1712    calcitRIOL (ROCALTROL) capsule 0.5 mcg  0.5 mcg Oral Daily Moses Dai MD   0.5 mcg at 01/26/20 0846    atorvastatin (LIPITOR) tablet 40 mg  40 mg Oral Nightly Moses Landis MD   40 mg at 01/27/20 2319    sodium chloride flush 0.9 % injection 10 mL  10 mL Intravenous 2 times per day Quincy Aguirre DO   10 mL at 01/26/20 2013    sodium chloride flush 0.9 % injection 10 mL  10 mL Intravenous PRN Perla Cruz DO   10 mL at 01/23/20 0430    magnesium hydroxide (MILK OF MAGNESIA) 400 MG/5ML suspension 30 mL  30 mL Oral Daily PRN Quincy Aguirre DO        ondansetron (ZOFRAN) injection 4 mg  4 mg Intravenous Q6H PRN Quincy Aguirre DO   4 mg at 01/24/20 2124    acetaminophen (TYLENOL) tablet 650 mg  650 mg Oral Q6H PRN Juju Dockery,

## 2020-01-28 NOTE — PROGRESS NOTES
Pt dialyzed 182\", 3.0L removed, 60.1L processed. Dialyzer moderately streaked. Pt tolerated well. Accessed R CVC s complication, +blood return, good flow. Administered retacrit s AR. Educated Pt r/t Tx, s/s infection/bleeding, medications, and diet. Pt stated he was going to die. See previous note. Tx completed @ 2100, blood returned. R CVC NS flushed, heplocked and capped. Dressing changed. Report given to RN.

## 2020-01-29 ENCOUNTER — APPOINTMENT (OUTPATIENT)
Dept: INTERVENTIONAL RADIOLOGY/VASCULAR | Age: 45
DRG: 890 | End: 2020-01-29
Payer: MEDICAID

## 2020-01-29 ENCOUNTER — APPOINTMENT (OUTPATIENT)
Dept: CT IMAGING | Age: 45
DRG: 890 | End: 2020-01-29
Payer: MEDICAID

## 2020-01-29 LAB
ALBUMIN SERPL-MCNC: 2.2 GM/DL (ref 3.4–5)
ANION GAP SERPL CALCULATED.3IONS-SCNC: 18 MMOL/L (ref 4–16)
APTT: 39.2 SECONDS (ref 25.1–37.1)
BASOPHILS ABSOLUTE: 0 K/CU MM
BASOPHILS RELATIVE PERCENT: 0 % (ref 0–1)
BUN BLDV-MCNC: 50 MG/DL (ref 6–23)
CALCIUM SERPL-MCNC: 8 MG/DL (ref 8.3–10.6)
CHLORIDE BLD-SCNC: 93 MMOL/L (ref 99–110)
CO2: 25 MMOL/L (ref 21–32)
COMPONENT: NORMAL
CREAT SERPL-MCNC: 7.4 MG/DL (ref 0.9–1.3)
DIFFERENTIAL TYPE: ABNORMAL
EOSINOPHILS ABSOLUTE: 0 K/CU MM
EOSINOPHILS RELATIVE PERCENT: 0.2 % (ref 0–3)
GFR AFRICAN AMERICAN: 10 ML/MIN/1.73M2
GFR NON-AFRICAN AMERICAN: 8 ML/MIN/1.73M2
GLUCOSE BLD-MCNC: 90 MG/DL (ref 70–99)
HCT VFR BLD CALC: 25 % (ref 42–52)
HEMOGLOBIN: 7.7 GM/DL (ref 13.5–18)
HIGH SENSITIVE C-REACTIVE PROTEIN: 169.6 MG/L
IMMATURE NEUTROPHIL %: 0.5 % (ref 0–0.43)
INR BLD: 1.4 INDEX
LYMPHOCYTES ABSOLUTE: 0.2 K/CU MM
LYMPHOCYTES RELATIVE PERCENT: 3.8 % (ref 24–44)
MCH RBC QN AUTO: 25.4 PG (ref 27–31)
MCHC RBC AUTO-ENTMCNC: 30.8 % (ref 32–36)
MCV RBC AUTO: 82.5 FL (ref 78–100)
MONOCYTES ABSOLUTE: 0.1 K/CU MM
MONOCYTES RELATIVE PERCENT: 1.9 % (ref 0–4)
NIL (NEGATIVE) SPOT CONTROL: NORMAL
NUCLEATED RBC %: 0 %
PANEL A SPOT COUNT: 0
PANEL B SPOT COUNT: 0
PDW BLD-RTO: 22.2 % (ref 11.7–14.9)
PHOSPHORUS: 4.7 MG/DL (ref 2.5–4.9)
PLATELET # BLD: 168 K/CU MM (ref 140–440)
PMV BLD AUTO: 11.3 FL (ref 7.5–11.1)
POSITIVE CONTROL SPOT COUNT: NORMAL
POSITIVE CONTROL SPOT COUNT: NORMAL
POTASSIUM SERPL-SCNC: 4 MMOL/L (ref 3.5–5.1)
PROCALCITONIN: 18.26
PROTHROMBIN TIME: 17 SECONDS (ref 11.7–14.5)
RBC # BLD: 3.03 M/CU MM (ref 4.6–6.2)
SEGMENTED NEUTROPHILS ABSOLUTE COUNT: 5.9 K/CU MM
SEGMENTED NEUTROPHILS RELATIVE PERCENT: 93.6 % (ref 36–66)
SODIUM BLD-SCNC: 136 MMOL/L (ref 135–145)
STATUS: NORMAL
TB CELL IMMUNE MEASURE: NORMAL
TOTAL IMMATURE NEUTOROPHIL: 0.03 K/CU MM
TOTAL NUCLEATED RBC: 0 K/CU MM
TRANSFUSION STATUS: NORMAL
UNIT DIVISION: 0
UNIT NUMBER: NORMAL
WBC # BLD: 6.3 K/CU MM (ref 4–10.5)

## 2020-01-29 PROCEDURE — 99253 IP/OBS CNSLTJ NEW/EST LOW 45: CPT | Performed by: NURSE PRACTITIONER

## 2020-01-29 PROCEDURE — 6370000000 HC RX 637 (ALT 250 FOR IP): Performed by: NURSE PRACTITIONER

## 2020-01-29 PROCEDURE — C1750 CATH, HEMODIALYSIS,LONG-TERM: HCPCS

## 2020-01-29 PROCEDURE — 2580000003 HC RX 258: Performed by: INTERNAL MEDICINE

## 2020-01-29 PROCEDURE — 36556 INSERT NON-TUNNEL CV CATH: CPT

## 2020-01-29 PROCEDURE — 02HV33Z INSERTION OF INFUSION DEVICE INTO SUPERIOR VENA CAVA, PERCUTANEOUS APPROACH: ICD-10-PCS | Performed by: RADIOLOGY

## 2020-01-29 PROCEDURE — 6360000002 HC RX W HCPCS: Performed by: INTERNAL MEDICINE

## 2020-01-29 PROCEDURE — C1769 GUIDE WIRE: HCPCS

## 2020-01-29 PROCEDURE — C1751 CATH, INF, PER/CENT/MIDLINE: HCPCS

## 2020-01-29 PROCEDURE — 77001 FLUOROGUIDE FOR VEIN DEVICE: CPT

## 2020-01-29 PROCEDURE — 94761 N-INVAS EAR/PLS OXIMETRY MLT: CPT

## 2020-01-29 PROCEDURE — 2580000003 HC RX 258: Performed by: STUDENT IN AN ORGANIZED HEALTH CARE EDUCATION/TRAINING PROGRAM

## 2020-01-29 PROCEDURE — 87040 BLOOD CULTURE FOR BACTERIA: CPT

## 2020-01-29 PROCEDURE — C9113 INJ PANTOPRAZOLE SODIUM, VIA: HCPCS | Performed by: INTERNAL MEDICINE

## 2020-01-29 PROCEDURE — 2060000000 HC ICU INTERMEDIATE R&B

## 2020-01-29 PROCEDURE — 86141 C-REACTIVE PROTEIN HS: CPT

## 2020-01-29 PROCEDURE — 85730 THROMBOPLASTIN TIME PARTIAL: CPT

## 2020-01-29 PROCEDURE — 0JH63XZ INSERTION OF TUNNELED VASCULAR ACCESS DEVICE INTO CHEST SUBCUTANEOUS TISSUE AND FASCIA, PERCUTANEOUS APPROACH: ICD-10-PCS | Performed by: RADIOLOGY

## 2020-01-29 PROCEDURE — 85025 COMPLETE CBC W/AUTO DIFF WBC: CPT

## 2020-01-29 PROCEDURE — 2709999900 HC NON-CHARGEABLE SUPPLY

## 2020-01-29 PROCEDURE — 84145 PROCALCITONIN (PCT): CPT

## 2020-01-29 PROCEDURE — 85610 PROTHROMBIN TIME: CPT

## 2020-01-29 PROCEDURE — 6360000002 HC RX W HCPCS: Performed by: STUDENT IN AN ORGANIZED HEALTH CARE EDUCATION/TRAINING PROGRAM

## 2020-01-29 PROCEDURE — 70450 CT HEAD/BRAIN W/O DYE: CPT

## 2020-01-29 PROCEDURE — 71250 CT THORAX DX C-: CPT

## 2020-01-29 PROCEDURE — 36558 INSERT TUNNELED CV CATH: CPT

## 2020-01-29 PROCEDURE — 6370000000 HC RX 637 (ALT 250 FOR IP): Performed by: INTERNAL MEDICINE

## 2020-01-29 PROCEDURE — 76937 US GUIDE VASCULAR ACCESS: CPT

## 2020-01-29 PROCEDURE — 80069 RENAL FUNCTION PANEL: CPT

## 2020-01-29 PROCEDURE — C1881 DIALYSIS ACCESS SYSTEM: HCPCS

## 2020-01-29 PROCEDURE — C1894 INTRO/SHEATH, NON-LASER: HCPCS

## 2020-01-29 PROCEDURE — 99233 SBSQ HOSP IP/OBS HIGH 50: CPT | Performed by: INTERNAL MEDICINE

## 2020-01-29 PROCEDURE — 2700000000 HC OXYGEN THERAPY PER DAY

## 2020-01-29 PROCEDURE — 2500000003 HC RX 250 WO HCPCS: Performed by: INTERNAL MEDICINE

## 2020-01-29 PROCEDURE — 6370000000 HC RX 637 (ALT 250 FOR IP): Performed by: HOSPITALIST

## 2020-01-29 RX ORDER — HEPARIN SODIUM 1000 [USP'U]/ML
3800 INJECTION, SOLUTION INTRAVENOUS; SUBCUTANEOUS
Status: DISCONTINUED | OUTPATIENT
Start: 2020-01-29 | End: 2020-01-29

## 2020-01-29 RX ORDER — WARFARIN SODIUM 2.5 MG/1
2.5 TABLET ORAL DAILY
Status: DISCONTINUED | OUTPATIENT
Start: 2020-01-29 | End: 2020-01-29

## 2020-01-29 RX ORDER — FLUMAZENIL 0.1 MG/ML
0.2 INJECTION, SOLUTION INTRAVENOUS ONCE
Status: DISCONTINUED | OUTPATIENT
Start: 2020-01-29 | End: 2020-01-29

## 2020-01-29 RX ORDER — CHLORPROMAZINE HYDROCHLORIDE 25 MG/1
25 TABLET, FILM COATED ORAL ONCE
Status: DISCONTINUED | OUTPATIENT
Start: 2020-01-29 | End: 2020-02-06 | Stop reason: HOSPADM

## 2020-01-29 RX ORDER — LINEZOLID 2 MG/ML
600 INJECTION, SOLUTION INTRAVENOUS EVERY 12 HOURS
Status: DISCONTINUED | OUTPATIENT
Start: 2020-01-29 | End: 2020-02-04

## 2020-01-29 RX ORDER — ACETAMINOPHEN 650 MG/1
650 SUPPOSITORY RECTAL EVERY 4 HOURS PRN
Status: DISCONTINUED | OUTPATIENT
Start: 2020-01-29 | End: 2020-02-06 | Stop reason: HOSPADM

## 2020-01-29 RX ADMIN — Medication 50 MG: at 16:20

## 2020-01-29 RX ADMIN — ACETAMINOPHEN 650 MG: 325 TABLET ORAL at 15:05

## 2020-01-29 RX ADMIN — SEVELAMER CARBONATE 800 MG: 800 TABLET, FILM COATED ORAL at 16:21

## 2020-01-29 RX ADMIN — DOLUTEGRAVIR SODIUM 50 MG: 50 TABLET, FILM COATED ORAL at 17:50

## 2020-01-29 RX ADMIN — EPOETIN ALFA-EPBX 3000 UNITS: 3000 INJECTION, SOLUTION INTRAVENOUS; SUBCUTANEOUS at 13:33

## 2020-01-29 RX ADMIN — LINEZOLID 600 MG: 600 INJECTION, SOLUTION INTRAVENOUS at 16:19

## 2020-01-29 RX ADMIN — SODIUM CHLORIDE, PRESERVATIVE FREE 10 ML: 5 INJECTION INTRAVENOUS at 16:18

## 2020-01-29 RX ADMIN — EPOETIN ALFA-EPBX 2000 UNITS: 2000 INJECTION, SOLUTION INTRAVENOUS; SUBCUTANEOUS at 13:33

## 2020-01-29 RX ADMIN — ONDANSETRON HYDROCHLORIDE 4 MG: 2 INJECTION, SOLUTION INTRAMUSCULAR; INTRAVENOUS at 09:04

## 2020-01-29 RX ADMIN — CEFTOLOZANE AND TAZOBACTAM 2.25 G: 1; .5 INJECTION, POWDER, LYOPHILIZED, FOR SOLUTION INTRAVENOUS at 16:34

## 2020-01-29 RX ADMIN — ATORVASTATIN CALCIUM 40 MG: 40 TABLET, FILM COATED ORAL at 20:54

## 2020-01-29 RX ADMIN — MIDODRINE HYDROCHLORIDE 10 MG: 5 TABLET ORAL at 16:21

## 2020-01-29 RX ADMIN — METRONIDAZOLE 500 MG: 500 INJECTION, SOLUTION INTRAVENOUS at 16:19

## 2020-01-29 RX ADMIN — IRON SUCROSE 50 MG: 20 INJECTION, SOLUTION INTRAVENOUS at 13:34

## 2020-01-29 RX ADMIN — SODIUM CHLORIDE, PRESERVATIVE FREE 10 ML: 5 INJECTION INTRAVENOUS at 20:55

## 2020-01-29 RX ADMIN — SULFAMETHOXAZOLE AND TRIMETHOPRIM 328 MG: 80; 16 INJECTION, SOLUTION, CONCENTRATE INTRAVENOUS at 16:19

## 2020-01-29 RX ADMIN — PANTOPRAZOLE SODIUM 40 MG: 40 INJECTION, POWDER, FOR SOLUTION INTRAVENOUS at 08:24

## 2020-01-29 RX ADMIN — ACETAMINOPHEN 650 MG: 650 SUPPOSITORY RECTAL at 04:13

## 2020-01-29 RX ADMIN — APIXABAN 5 MG: 5 TABLET, FILM COATED ORAL at 20:54

## 2020-01-29 RX ADMIN — CALCITRIOL CAPSULES 0.25 MCG 0.5 MCG: 0.25 CAPSULE ORAL at 16:21

## 2020-01-29 RX ADMIN — CALCIUM ACETATE 1334 MG: 667 CAPSULE ORAL at 16:00

## 2020-01-29 ASSESSMENT — PAIN DESCRIPTION - LOCATION: LOCATION: GENERALIZED

## 2020-01-29 ASSESSMENT — PAIN SCALES - GENERAL
PAINLEVEL_OUTOF10: 0

## 2020-01-29 ASSESSMENT — PAIN DESCRIPTION - PAIN TYPE: TYPE: ACUTE PAIN

## 2020-01-29 NOTE — PROGRESS NOTES
gluconate IVPB **OR** calcium gluconate IVPB **OR** calcium gluconate IVPB **OR** calcium gluconate IVPB, sodium chloride flush, magnesium hydroxide, ondansetron, acetaminophen  I/O last 3 completed shifts: In: 10 [I.V.:10]  Out: 225 [Urine:225]  No intake/output data recorded. Intake/Output Summary (Last 24 hours) at 1/29/2020 0824  Last data filed at 1/29/2020 0333  Gross per 24 hour   Intake 10 ml   Output 225 ml   Net -215 ml     CBC:   Recent Labs     01/27/20  0620 01/28/20  0630 01/29/20  0520   WBC 11.4* 8.3 6.3   HGB 9.2* 8.5* 7.7*    179 168     BMP:    Recent Labs     01/27/20  0620 01/28/20  0630 01/29/20  0520   * 130* 136   K 4.1 4.3 4.0   CL 85* 91* 93*   CO2 22 24 25   BUN 51* 31* 50*   CREATININE 6.7* 5.3* 7.4*   GLUCOSE 100* 95 90     Hepatic:   No results for input(s): AST, ALT, ALB, BILITOT, ALKPHOS in the last 72 hours. Troponin: No results for input(s): TROPONINI in the last 72 hours. BNP: No results for input(s): BNP in the last 72 hours. Lipids: No results for input(s): CHOL, HDL in the last 72 hours.     Invalid input(s): LDLCALCU  ABGs:   Lab Results   Component Value Date    PO2ART 77 01/19/2020    UJQ5QTB 39.0 01/19/2020     INR:   Recent Labs     01/28/20  0630 01/28/20  1335 01/29/20  0520   INR 2.73 2.41 1.40     Renal Labs  Albumin:    Lab Results   Component Value Date    LABALBU 2.2 01/29/2020    LABALBU 40 01/15/2020     Calcium:    Lab Results   Component Value Date    CALCIUM 8.0 01/29/2020     Phosphorus:    Lab Results   Component Value Date    PHOS 4.7 01/29/2020     U/A:    Lab Results   Component Value Date    NITRU NEGATIVE 01/15/2020    COLORU YELLOW 01/15/2020    WBCUA 2 01/15/2020    RBCUA 1 01/15/2020    MUCUS RARE 01/15/2020    TRICHOMONAS NONE SEEN 01/15/2020    BACTERIA NEGATIVE 01/15/2020    CLARITYU CLEAR 01/15/2020    SPECGRAV 1.024 01/15/2020    UROBILINOGEN NORMAL 01/15/2020    BILIRUBINUR NEGATIVE 01/15/2020    BLOODU NEGATIVE 01/15/2020 MORIA MCKEON 01/15/2020     ABG:    Lab Results   Component Value Date    JLA4FCL 39.0 01/19/2020    PO2ART 77 01/19/2020    NGY7PZV 26.5 01/19/2020     HgBA1c:    Lab Results   Component Value Date    LABA1C 6.1 01/15/2020     Microalbumen/Creatinine ratio:  No components found for: RUCREAT          Objective:   Vitals: /73   Pulse 66   Temp 97.7 °F (36.5 °C) (Oral)   Resp 21   Ht 5' 2\" (1.575 m)   Wt 144 lb 10 oz (65.6 kg)   SpO2 99%   BMI 26.45 kg/m²   General appearance: awake with sitter  HEENT: Head: Normal, normocephalic, atraumatic.   Neck: supple, symmetrical, trachea midline  Lungs: diminished breath sounds bilaterally  Heart: S1, S2 normal  Abdomen: abnormal findings:  soft nt  Extremities: edema trace hd catheter  Neurologic: Mental status: alertness: awake mild confusion this am      Patient Active Problem List:     Acute renal failure (HCC)    Assessment and Plan:      IMP:  esrd from hivan  Hyperkalemia/hyponatremia  Nephrotic syndrome/HIVAN  PE with sob  Anemia  hiv +  Hep B prior infection  hypotension      Plan     Doing hd today after get new line  Change to tunnel hd line today and medical necessity and dw pt and with witness signed for pt and explained as well  Give romazicon and avoid ativan  Restart heparin and coumadin after line and check ct head  epo with hd  Maintain hiv meds  Treat in isolation not TB  bp low stable  Work on support outpt and not able set up outpt dialysis due to insurance issues  Will need social service help for options  Will monitor             Ebony Toro MD

## 2020-01-29 NOTE — PROGRESS NOTES
mg at 01/28/20 2059    diphenhydrAMINE (BENADRYL) tablet 50 mg  50 mg Oral Nightly PRN Mauro Eisenbergoring, DO   50 mg at 01/27/20 2320    hydrOXYzine (VISTARIL) capsule 25 mg  25 mg Oral TID PRN Mauro Eisenbergoring, DO   25 mg at 01/26/20 2013    sodium chloride flush 0.9 % injection 10 mL  10 mL Intravenous PRN Lonzie Duverney, MD        calcium gluconate 1 g in dextrose 5 % 100 mL IVPB  1 g Intravenous PRN Antonio Sparks MD   Stopped at 01/21/20 1920    Or    calcium gluconate 2 g in dextrose 5 % 100 mL IVPB  2 g Intravenous PRN Antonio Sparks MD        Or   David Lou calcium gluconate 3 g in dextrose 5 % 100 mL IVPB  3 g Intravenous PRN Antonio Sparks MD        Or    calcium gluconate 4 g in dextrose 5 % 90 mL IVPB  4 g Intravenous PRN Atnonio Sparks MD        0.9 % sodium chloride infusion 250 mL  250 mL Intravenous Once URBANO Matias - CNP        LORazepam (ATIVAN) injection 0.5 mg  0.5 mg Intravenous Once Antonio Sparks MD        metoprolol tartrate (LOPRESSOR) tablet 50 mg  50 mg Oral BID Tyrese Mejia MD   50 mg at 01/28/20 2059    calcium acetate (PHOSLO) capsule 1,334 mg  1,334 mg Oral TID  Moses Sherman MD   1,334 mg at 01/28/20 1829    sevelamer (RENVELA) tablet 800 mg  800 mg Oral TID  Moses Sherman MD   800 mg at 01/28/20 1829    calcitRIOL (ROCALTROL) capsule 0.5 mcg  0.5 mcg Oral Daily Moses Snow MD   0.5 mcg at 01/28/20 1119    atorvastatin (LIPITOR) tablet 40 mg  40 mg Oral Nightly Moses Sherman MD   40 mg at 01/28/20 2059    sodium chloride flush 0.9 % injection 10 mL  10 mL Intravenous 2 times per day Mauro Husain, DO   10 mL at 01/28/20 2109    sodium chloride flush 0.9 % injection 10 mL  10 mL Intravenous PRN Perla Cruz DO   10 mL at 01/23/20 0430    magnesium hydroxide (MILK OF MAGNESIA) 400 MG/5ML suspension 30 mL  30 mL Oral Daily PRN Perla Cruz DO        ondansetron (ZOFRAN) injection 4 mg  4

## 2020-01-29 NOTE — PROCEDURES
1025   Received in IR per cart. Informed consent in chart, signed with Dr Jonny Dai, pt , and witnessed [facility]. 1036   Assisted to IR table. Monitors applied. Vital signs taken and stable. Right chest prepped and draped per IR technologist.    7005   Sedation given per Dr Ilan Mancini. [address] accessed at this time. 1054   Blunt tunneling of tunneled catheter at this time. 1055   Catheter placed at this time. 1058   Temp cath removed at this time. [address] placed at this time. 1106   Both tunneled dialysis catheter and central line sutured in place. 200   DSD applied. Procedure completed. Pt tolerated procedure well. Vital signs remain stable.

## 2020-01-29 NOTE — PROGRESS NOTES
HD tx complete 2400 ml of fluid removed, Blood culture taken, Tylenol  given and Physician aware of pt fever.

## 2020-01-29 NOTE — PLAN OF CARE
Falls - Risk of:  Goal: Will remain free from falls  Description  Will remain free from falls  Outcome: Ongoing  Goal: Absence of physical injury  Description  Absence of physical injury  Outcome: Ongoing     Problem: Nutrition  Goal: Optimal nutrition therapy  1/29/2020 0500 by Luz Valencia RN  Outcome: Ongoing  1/28/2020 1544 by Hitesh Booker RD, LD  Outcome: Ongoing     Problem: Pain:  Goal: Pain level will decrease  Description  Pain level will decrease  Outcome: Ongoing  Goal: Control of acute pain  Description  Control of acute pain  Outcome: Ongoing  Goal: Control of chronic pain  Description  Control of chronic pain  Outcome: Ongoing

## 2020-01-29 NOTE — CONSULTS
Initial Psychiatric History and Physical    Loreta Echavarria  [id number]  1/15/2020  01/29/20    ID: Patient is a 45 yrs y.o. male    CC: Depression    Psychiatry consulted for depression    HPI: Loreta Echavarria. is a 45 y.o. male  who presents with fevers intermittently for a month. Associated with anorexia and admits to weight loss. Denies n/v/d/c, abd pain, headaches, chest pain. No alleviating or exacerbating factors noted. No radiation of the symptoms. States he has been in the [place] for ~3 years and is originally from [facility]. He has worked for the [employer] for ~10 years. Denies most medical problems, denies regular medical care. Admits to being  with his [place]-American wife living in [place]. Admits to being sexually active with women only. Denies IVDU, herbal use, MJ use and states he only takes tylenol on occasion. During today's interview he was alert and oriented to self, city and year. He was not able to state the month. He denies SI/HI and AV hallucinations. He endorses depression and anxiety but is unable to rate them on a numeric scale. He notes that his sleep and appetite are \"very bad. \" His bedside nurse states that he has been very tearful and labile as well as hyper-Protestant. His renal values are elevated BUN 31, creatinine 5.5 GFR 14. He is on dialysis 3 times/weekly. He is being followed by Infectious Disease. Plan is for patient to return to [place] for continued medical treatment.     Past Psychiatric History: denies any suicide attempts, inpatient Psychiatric hospitalizations    Family Psychiatric History: none known  Family History   Problem Relation Age of Onset    Stroke Mother         Allergies:  No Known Allergies     OBJECTIVE  Vital Signs:  Vitals:    01/29/20 1315   BP: 121/76   Pulse:    Resp:    Temp:    SpO2:        Labs:  Recent Results (from the past 48 hour(s))   CBC Auto Differential    Collection Time: 01/28/20  6:30 AM   Result Value Ref Range    WBC 8.3 4.0 - 10.5 K/CU MM APTT    Collection Time: 01/28/20  1:35 PM   Result Value Ref Range    aPTT 105.0 (H) 25.1 - 37.1 SECONDS   APTT    Collection Time: 01/28/20  9:17 PM   Result Value Ref Range    aPTT 72.5 (H) 25.1 - 37.1 SECONDS   CBC Auto Differential    Collection Time: 01/29/20  5:20 AM   Result Value Ref Range    WBC 6.3 4.0 - 10.5 K/CU MM    RBC 3.03 (L) 4.6 - 6.2 M/CU MM    Hemoglobin 7.7 (L) 13.5 - 18.0 GM/DL    Hematocrit 25.0 (L) 42 - 52 %    MCV 82.5 78 - 100 FL    MCH 25.4 (L) 27 - 31 PG    MCHC 30.8 (L) 32.0 - 36.0 %    RDW 22.2 (H) 11.7 - 14.9 %    Platelets 452 233 - 663 K/CU MM    MPV 11.3 (H) 7.5 - 11.1 FL    Differential Type AUTOMATED DIFFERENTIAL     Segs Relative 93.6 (H) 36 - 66 %    Lymphocytes % 3.8 (L) 24 - 44 %    Monocytes % 1.9 0 - 4 %    Eosinophils % 0.2 0 - 3 %    Basophils % 0.0 0 - 1 %    Segs Absolute 5.9 K/CU MM    Lymphocytes Absolute 0.2 K/CU MM    Monocytes Absolute 0.1 K/CU MM    Eosinophils Absolute 0.0 K/CU MM    Basophils Absolute 0.0 K/CU MM    Nucleated RBC % 0.0 %    Total Nucleated RBC 0.0 K/CU MM    Total Immature Neutrophil 0.03 K/CU MM    Immature Neutrophil % 0.5 (H) 0 - 0.43 %   Renal Function Panel    Collection Time: 01/29/20  5:20 AM   Result Value Ref Range    Sodium 136 135 - 145 MMOL/L    Potassium 4.0 3.5 - 5.1 MMOL/L    Chloride 93 (L) 99 - 110 mMol/L    CO2 25 21 - 32 MMOL/L    Anion Gap 18 (H) 4 - 16    BUN 50 (H) 6 - 23 MG/DL    CREATININE 7.4 (H) 0.9 - 1.3 MG/DL    Glucose 90 70 - 99 MG/DL    Calcium 8.0 (L) 8.3 - 10.6 MG/DL    GFR Non- 8 (L) >60 mL/min/1.73m2    GFR  10 (L) >60 mL/min/1.73m2    Alb 2.2 (L) 3.4 - 5.0 GM/DL    Phosphorus 4.7 2.5 - 4.9 MG/DL   Protime-INR    Collection Time: 01/29/20  5:20 AM   Result Value Ref Range    Protime 17.0 (H) 11.7 - 14.5 SECONDS    INR 1.40 INDEX   Procalcitonin    Collection Time: 01/29/20  5:20 AM   Result Value Ref Range    Procalcitonin 18.26    APTT    Collection Time: 01/29/20  5:20 AM Result Value Ref Range    aPTT 39.2 (H) 25.1 - 37.1 SECONDS   C-Reactive Protein    Collection Time: 01/29/20  5:20 AM   Result Value Ref Range    CRP, High Sensitivity 169.6 mg/L       Review of Systems:  Reports of no current cardiovascular, respiratory, gastrointestinal, genitourinary, integumentary, neurological, muscuoskeletal, or immunological symptoms today. PSYCHIATRIC: See HPI above.     PSYCHIATRIC EXAMINATION / MENTAL STATUS EXAM    CONSTITUTIONAL:    Vitals:   Vitals:    01/29/20 1315   BP: 121/76   Pulse:    Resp:    Temp:    SpO2:       General appearance: [x] appears age, []  appears older than stated age,               [x]  adequately dressed and groomed, [] disheveled,               [x]  in no acute distress, [] appears mildly distressed, [] other           MUSCULOSKELETAL:   Gait:   [] normal, [] antalgic, [] unsteady, [x] gait not evaluated   Station:             [] erect, [x] sitting, [] recumbent, [] other        Strength/tone:  [x] muscle strength and tone appear consistent with age and condition     [] atrophy      [] abnormal movements  PSYCHIATRIC:    Relatedness:  [x] cooperative, [] guarded, [] indifferent, [] hostile,      [] sedated  Speech:  [x] normal prosody, [] pressured, [] decreased volume,    [] increased volume [] slurred [] slowed, [] delayed     [] echolalia, [] incoherent, [] stuttering   Eye contact:  [] direct, [x] fleeting , [] intense []  none  Kinetics:  [x] normal, [] increased, [] decreased  Mood:   [] stable, [x] depressed, [x] anxious, [] irritable,     [x] labile  [] euphoric   Affect:   [] normal range, [] constricted, [x] depressed, [x] anxious,  [] angry, [x]  blunted     [] mood incongruent, [] blunted  [] restricted   Hallucinations:  [] denies, [] auditory,  [] visual,  [] olfactory, [] tactile  Delusions:  [x] none, [] grandiose,  [] paranoid,  [] persecutory,  [] somatic,     [] bizarre  [] Druze/spiritual    Preoccupations:   [x] none, [] violence, [] obsessions, [] other     Suicidal ideation  [x] denies, [] endorses  Homicidal ideation [x] denies, [] endorses  Thought process: [x] logical , [] circumstantial, [] tangential, [] KATJA,     [] simplistic, [] disorganized  [] FOI  [] concrete  [] nonsensical    Thought Content: [] future oriented [x] goal directed  [] self-harm, [] guilt,     [] hopelessness  [] obsessive  [] superficial  [] preoccupation    Insight:   [] adequate , [x] limited , [] impaired    Judgment:  [] adequate , [x] limited  [] impaired  Associations:              [x]  Logical and coherent , [] loosening, [] disorganized   Attention and concentration:     [] intact [x] limited [] impaired , [] grossly impaired  Orientation:  [x] person, place, time, situation     [x] disoriented to: month    Memory:             [x] superficially intact, [] impaired         Impression:   Current severe episode of major depressive disorder without psychotic features without prior episode    Problem List:   AIDS [facility]ac[facility] immune deficiency syndrome[facility] ([facility]    Plan:  1. Case discussed with Dr. Chowdary Needs  2. Start Zoloft 50 mg daily  3. Will follow loosely    Thank you very much for the consult.       Th  Electronically signed by URBANO Prince CNP on 1/29/2020 at 1:31 PM

## 2020-01-29 NOTE — BH NOTE
Stopped by patient's room to do Psychiatric consult. Patient in dialysis. Will stop by again tomorrow.

## 2020-01-29 NOTE — PROGRESS NOTES
Pt seen this am and is lucid to place and time and to me dr Lily Hong but does lose some facts. Got ativan last night and will try romazicon today. Check ct head no contrast and heparin stopped midnight and confusion prior to that. Restart heparin and coumadin after line. dw him about hd line and aware need for dialysis.  Will get tunnel hd line this am placed and need for medical neccesity for dialysis

## 2020-01-29 NOTE — FLOWSHEET NOTE
Pt seems to be very weak, stated that he is afraid he wont be able to walk anymore. This nurse took pt for walk with walker (first time since admission), did well, but weak and unsteady some. Up to chair sitter at bedside, call light within reach.

## 2020-01-29 NOTE — FLOWSHEET NOTE
Pt back from dialysis at 1530, alert oriented, says he feels much better. Spoke with Dr. Srinath Mc regarding temp being down, and mental status better and labs related to heparin drip that has been d'c.

## 2020-01-29 NOTE — PROGRESS NOTES
Infectious Disease Progress Note  2020   Patient Name: Urmila Rivera. : 1975   Impression  · Fever  ? Fever spike of 101.2F, procalcitonin is elevated-18, CRP is elevated-169 (from previous lower levels)  ? Seems too quick to be IRIS (but not impossible), suspect WILLIAM- pneumonia, CLABSI  · AIDS-advanced HIV  ? CD4 count 20, HIV viral load: 1.7 million  ? HIV genotype: No significant resistance  ? Plan to treat with Tenofovir disoproxil fumarate (TDF) weekly, lamivudine (3TC) and dolutegravir (DTG) daily, after TB and other OIs (opportunistic infections) are ruled out  · Bilateral pneumonia with hypoxic respiratory failure. ? S/p bronchoscopy with BAL AFB negative  ? Etiology of pneumonia remains unknown, but pct is downtrending suggesting a bacterial pneumonia  ? Procalcitonin continues to rise: ? Mycobacterium  ? Fungitell negative, PJP unlikely   ? Toxoplasma IgG negative  ? Other fungal work up negative. · Pulmonary Embolism- right pulmonary artery  ? Remains on heparin drip  · Acute kidney injury with ATN  ? Now on intermittent HD. ? S/p left renal biopsy with CT showing pararenal fluid likely blood/hematoma  · Past hepatitis B infection  ? Hepatitis B surface antibody positive, anti-HB C antibody positive, hepatitis B surface antigen negative  ? Hepatitis C antibody negative  · Multi-morbidity: per PMHx  Plan:  · discontinue cefepime, doxycycline  · Blood cx from line and periphery, CT of the chest.  Plans for CT of the head and noted. · Start linezolid, Zerbaxa, metronidazole  · Increase bactrim dose  · Continue   mg weekly (post HD), 3TC 150 mg once and then 50 mg daily  and DTG 50 mg daily   · F/u BAL toxoplasma PCR, AFB, fungal      Ongoing Antimicrobial Therapy  Linezolid   Zerbaxa   Metronidazole ; -  Bactrim    TDF   3TC   DTG     Completed Antimicrobial Therapy  Vancomycin -  Metronidazole ?   Cefepime -  Doxycycline Kelle Duncan MD on 1/29/2020 at 8:32 AM

## 2020-01-30 LAB
ALBUMIN SERPL-MCNC: 2.6 GM/DL (ref 3.4–5)
ANION GAP SERPL CALCULATED.3IONS-SCNC: 18 MMOL/L (ref 4–16)
BASOPHILS ABSOLUTE: 0 K/CU MM
BASOPHILS RELATIVE PERCENT: 0 % (ref 0–1)
BUN BLDV-MCNC: 31 MG/DL (ref 6–23)
CALCIUM SERPL-MCNC: 7.8 MG/DL (ref 8.3–10.6)
CHLORIDE BLD-SCNC: 88 MMOL/L (ref 99–110)
CO2: 24 MMOL/L (ref 21–32)
CREAT SERPL-MCNC: 5.5 MG/DL (ref 0.9–1.3)
DIFFERENTIAL TYPE: ABNORMAL
EOSINOPHILS ABSOLUTE: 0 K/CU MM
EOSINOPHILS RELATIVE PERCENT: 0.3 % (ref 0–3)
GFR AFRICAN AMERICAN: 14 ML/MIN/1.73M2
GFR NON-AFRICAN AMERICAN: 11 ML/MIN/1.73M2
GLUCOSE BLD-MCNC: 146 MG/DL (ref 70–99)
HCT VFR BLD CALC: 28.1 % (ref 42–52)
HEMOGLOBIN: 8.6 GM/DL (ref 13.5–18)
HIGH SENSITIVE C-REACTIVE PROTEIN: 207.8 MG/L
IMMATURE NEUTROPHIL %: 0.4 % (ref 0–0.43)
LV EF: 28 %
LVEF MODALITY: NORMAL
LYMPHOCYTES ABSOLUTE: 0.2 K/CU MM
LYMPHOCYTES RELATIVE PERCENT: 2.4 % (ref 24–44)
MCH RBC QN AUTO: 25 PG (ref 27–31)
MCHC RBC AUTO-ENTMCNC: 30.6 % (ref 32–36)
MCV RBC AUTO: 81.7 FL (ref 78–100)
MONOCYTES ABSOLUTE: 0.2 K/CU MM
MONOCYTES RELATIVE PERCENT: 2.2 % (ref 0–4)
NUCLEATED RBC %: 0 %
PDW BLD-RTO: 21.8 % (ref 11.7–14.9)
PHOSPHORUS: 3.7 MG/DL (ref 2.5–4.9)
PLATELET # BLD: 167 K/CU MM (ref 140–440)
PMV BLD AUTO: 11.3 FL (ref 7.5–11.1)
POTASSIUM SERPL-SCNC: 3.6 MMOL/L (ref 3.5–5.1)
PROCALCITONIN: 28.55
RBC # BLD: 3.44 M/CU MM (ref 4.6–6.2)
SEGMENTED NEUTROPHILS ABSOLUTE COUNT: 6.8 K/CU MM
SEGMENTED NEUTROPHILS RELATIVE PERCENT: 94.7 % (ref 36–66)
SODIUM BLD-SCNC: 130 MMOL/L (ref 135–145)
TOTAL IMMATURE NEUTOROPHIL: 0.03 K/CU MM
TOTAL NUCLEATED RBC: 0 K/CU MM
WBC # BLD: 7.1 K/CU MM (ref 4–10.5)

## 2020-01-30 PROCEDURE — 80069 RENAL FUNCTION PANEL: CPT

## 2020-01-30 PROCEDURE — 2580000003 HC RX 258: Performed by: INTERNAL MEDICINE

## 2020-01-30 PROCEDURE — 84145 PROCALCITONIN (PCT): CPT

## 2020-01-30 PROCEDURE — 86141 C-REACTIVE PROTEIN HS: CPT

## 2020-01-30 PROCEDURE — C9113 INJ PANTOPRAZOLE SODIUM, VIA: HCPCS | Performed by: INTERNAL MEDICINE

## 2020-01-30 PROCEDURE — 85025 COMPLETE CBC W/AUTO DIFF WBC: CPT

## 2020-01-30 PROCEDURE — 6360000002 HC RX W HCPCS: Performed by: INTERNAL MEDICINE

## 2020-01-30 PROCEDURE — 86644 CMV ANTIBODY: CPT

## 2020-01-30 PROCEDURE — 6370000000 HC RX 637 (ALT 250 FOR IP): Performed by: INTERNAL MEDICINE

## 2020-01-30 PROCEDURE — 80053 COMPREHEN METABOLIC PANEL: CPT

## 2020-01-30 PROCEDURE — 94761 N-INVAS EAR/PLS OXIMETRY MLT: CPT

## 2020-01-30 PROCEDURE — 93306 TTE W/DOPPLER COMPLETE: CPT

## 2020-01-30 PROCEDURE — 2500000003 HC RX 250 WO HCPCS: Performed by: INTERNAL MEDICINE

## 2020-01-30 PROCEDURE — 86645 CMV ANTIBODY IGM: CPT

## 2020-01-30 PROCEDURE — 6370000000 HC RX 637 (ALT 250 FOR IP): Performed by: STUDENT IN AN ORGANIZED HEALTH CARE EDUCATION/TRAINING PROGRAM

## 2020-01-30 PROCEDURE — 6370000000 HC RX 637 (ALT 250 FOR IP): Performed by: HOSPITALIST

## 2020-01-30 PROCEDURE — 99233 SBSQ HOSP IP/OBS HIGH 50: CPT | Performed by: INTERNAL MEDICINE

## 2020-01-30 PROCEDURE — 2580000003 HC RX 258: Performed by: STUDENT IN AN ORGANIZED HEALTH CARE EDUCATION/TRAINING PROGRAM

## 2020-01-30 PROCEDURE — 2060000000 HC ICU INTERMEDIATE R&B

## 2020-01-30 RX ORDER — PREDNISONE 10 MG/1
40 TABLET ORAL DAILY
Status: COMPLETED | OUTPATIENT
Start: 2020-01-30 | End: 2020-02-03

## 2020-01-30 RX ADMIN — METRONIDAZOLE 500 MG: 500 INJECTION, SOLUTION INTRAVENOUS at 03:24

## 2020-01-30 RX ADMIN — SODIUM CHLORIDE, PRESERVATIVE FREE 10 ML: 5 INJECTION INTRAVENOUS at 08:59

## 2020-01-30 RX ADMIN — SEVELAMER CARBONATE 800 MG: 800 TABLET, FILM COATED ORAL at 16:17

## 2020-01-30 RX ADMIN — Medication 50 MG: at 16:21

## 2020-01-30 RX ADMIN — SEVELAMER CARBONATE 800 MG: 800 TABLET, FILM COATED ORAL at 08:57

## 2020-01-30 RX ADMIN — MIDODRINE HYDROCHLORIDE 10 MG: 5 TABLET ORAL at 16:18

## 2020-01-30 RX ADMIN — CALCIUM ACETATE 1334 MG: 667 CAPSULE ORAL at 16:17

## 2020-01-30 RX ADMIN — APIXABAN 5 MG: 5 TABLET, FILM COATED ORAL at 22:44

## 2020-01-30 RX ADMIN — SULFAMETHOXAZOLE AND TRIMETHOPRIM 328 MG: 80; 16 INJECTION, SOLUTION, CONCENTRATE INTRAVENOUS at 09:25

## 2020-01-30 RX ADMIN — DOLUTEGRAVIR SODIUM 50 MG: 50 TABLET, FILM COATED ORAL at 16:22

## 2020-01-30 RX ADMIN — APIXABAN 5 MG: 5 TABLET, FILM COATED ORAL at 08:57

## 2020-01-30 RX ADMIN — PREDNISONE 40 MG: 10 TABLET ORAL at 19:38

## 2020-01-30 RX ADMIN — CEFTOLOZANE AND TAZOBACTAM 450 MG: 1; .5 INJECTION, POWDER, LYOPHILIZED, FOR SOLUTION INTRAVENOUS at 09:18

## 2020-01-30 RX ADMIN — CALCIUM ACETATE 1334 MG: 667 CAPSULE ORAL at 12:07

## 2020-01-30 RX ADMIN — DIPHENHYDRAMINE HYDROCHLORIDE 50 MG: 25 TABLET ORAL at 00:20

## 2020-01-30 RX ADMIN — METOPROLOL TARTRATE 50 MG: 50 TABLET, FILM COATED ORAL at 22:45

## 2020-01-30 RX ADMIN — METRONIDAZOLE 500 MG: 500 INJECTION, SOLUTION INTRAVENOUS at 19:38

## 2020-01-30 RX ADMIN — CALCIUM ACETATE 1334 MG: 667 CAPSULE ORAL at 08:58

## 2020-01-30 RX ADMIN — METOPROLOL TARTRATE 50 MG: 50 TABLET, FILM COATED ORAL at 08:57

## 2020-01-30 RX ADMIN — LINEZOLID 600 MG: 600 INJECTION, SOLUTION INTRAVENOUS at 00:20

## 2020-01-30 RX ADMIN — METRONIDAZOLE 500 MG: 500 INJECTION, SOLUTION INTRAVENOUS at 12:07

## 2020-01-30 RX ADMIN — CEFTOLOZANE AND TAZOBACTAM 450 MG: 1; .5 INJECTION, POWDER, LYOPHILIZED, FOR SOLUTION INTRAVENOUS at 00:20

## 2020-01-30 RX ADMIN — CALCITRIOL CAPSULES 0.25 MCG 0.5 MCG: 0.25 CAPSULE ORAL at 08:58

## 2020-01-30 RX ADMIN — MIDODRINE HYDROCHLORIDE 10 MG: 5 TABLET ORAL at 08:58

## 2020-01-30 RX ADMIN — CEFTOLOZANE AND TAZOBACTAM 450 MG: 1; .5 INJECTION, POWDER, LYOPHILIZED, FOR SOLUTION INTRAVENOUS at 16:19

## 2020-01-30 RX ADMIN — HYDROXYZINE PAMOATE 25 MG: 25 CAPSULE ORAL at 00:20

## 2020-01-30 RX ADMIN — SODIUM CHLORIDE, PRESERVATIVE FREE 10 ML: 5 INJECTION INTRAVENOUS at 22:44

## 2020-01-30 RX ADMIN — PANTOPRAZOLE SODIUM 40 MG: 40 INJECTION, POWDER, FOR SOLUTION INTRAVENOUS at 09:00

## 2020-01-30 RX ADMIN — ATORVASTATIN CALCIUM 40 MG: 40 TABLET, FILM COATED ORAL at 22:44

## 2020-01-30 RX ADMIN — LINEZOLID 600 MG: 600 INJECTION, SOLUTION INTRAVENOUS at 12:07

## 2020-01-30 RX ADMIN — SEVELAMER CARBONATE 800 MG: 800 TABLET, FILM COATED ORAL at 12:07

## 2020-01-30 ASSESSMENT — PAIN SCALES - GENERAL
PAINLEVEL_OUTOF10: 0

## 2020-01-30 NOTE — PROGRESS NOTES
We were called into room because patient is very adamant of leaving he is saying god has touched him and he needs to leave. Patient stated that he has only a limited time to get to [place] to get his paperwork sign for residency. Patient is also saying that his family is not going to want anything to do with him because he has HIV. I explained to patient that he cannot leave at this time. He stated he is just going to die so he doesn't know why he needs to be here. I asked patient if he wants to hurt himself or kill him self and he stated no. Contacted Dr. Tanya Blackmon and requested he come and see patient. Patient is having confusion. Dr. Tanya Blackmon called and stated that he did this yesterday and that he will be up soon.

## 2020-01-30 NOTE — PROGRESS NOTES
Italo Cardona is a 45 y.o. male patient alert and oriented and wants to go home    Current Facility-Administered Medications   Medication Dose Route Frequency Provider Last Rate Last Dose    acetaminophen (TYLENOL) suppository 650 mg  650 mg Rectal Q4H PRN JujuURBANO Maria - CNP   650 mg at 01/29/20 0413    chlorproMAZINE (THORAZINE) tablet 25 mg  25 mg Oral Once Alyssia Huffman MD   Stopped at 01/29/20 0928    linezolid (ZYVOX) IVPB 600 mg  600 mg Intravenous Q12H Leatha Watkins MD   Stopped at 01/30/20 0132    metronidazole (FLAGYL) 500 mg in NaCl 100 mL IVPB premix  500 mg Intravenous Q8H Leatha Watkins MD   Stopped at 01/30/20 0422    sulfamethoxazole-trimethoprim (BACTRIM) 328 mg in dextrose 5 % 500 mL IVPB  5 mg/kg Intravenous Daily Leatha Watkins MD   Stopped at 01/29/20 2056    apixaban (ELIQUIS) tablet 5 mg  5 mg Oral BID Sari Ray MD   5 mg at 01/29/20 2054    ceftolozane-tazobactam (ZERBAXA) 450 mg in dextrose 5 % 100 mL IVPB  450 mg Intravenous Eliecer Rothman MD   Stopped at 01/30/20 0132    lamiVUDine (EPIVIR) 10 MG/ML solution 50 mg  50 mg Oral Daily Leatha Watkins MD   50 mg at 01/29/20 1620    tenofovir (VIREAD) tablet 300 mg  300 mg Oral Q7 Days Leatha Watkins MD   300 mg at 01/27/20 2320    dolutegravir sodium (TIVICAY) tablet 50 mg  50 mg Oral Daily Leatha Watkins MD   50 mg at 01/29/20 1750    ipratropium-albuterol (DUONEB) nebulizer solution 1 ampule  1 ampule Inhalation PRN Alyssia Huffman MD   1 ampule at 01/27/20 0044    midodrine (PROAMATINE) tablet 10 mg  10 mg Oral TID  Moses Ortiz MD   10 mg at 01/29/20 1621    pantoprazole (PROTONIX) injection 40 mg  40 mg Intravenous Daily Catherine Pardo MD   40 mg at 01/29/20 0824    diphenhydrAMINE (BENADRYL) tablet 50 mg  50 mg Oral Nightly PRN Hermelindo Arceo, DO   50 mg at 01/30/20 0020    hydrOXYzine (VISTARIL) capsule 25 mg  25 mg Oral TID PRN Hermelindo Arceo,    25 mg at 01/30/20 0020    sodium chloride flush 0.9 % injection 10 mL  10 mL Intravenous PRN Grisel Kemp MD        calcium gluconate 1 g in dextrose 5 % 100 mL IVPB  1 g Intravenous PRN Tal Murcia MD   Stopped at 01/21/20 1920    Or    calcium gluconate 2 g in dextrose 5 % 100 mL IVPB  2 g Intravenous PRN Tal Murcia MD        Or   Vishnu Marte calcium gluconate 3 g in dextrose 5 % 100 mL IVPB  3 g Intravenous PRN Tal Murcia MD        Or    calcium gluconate 4 g in dextrose 5 % 90 mL IVPB  4 g Intravenous PRN Tal Murcia MD        0.9 % sodium chloride infusion 250 mL  250 mL Intravenous Once URBANO Delacruz - CNP        LORazepam (ATIVAN) injection 0.5 mg  0.5 mg Intravenous Once Tal Murcia MD        metoprolol tartrate (LOPRESSOR) tablet 50 mg  50 mg Oral BID Allison Caldwell MD   50 mg at 01/28/20 2059    calcium acetate (PHOSLO) capsule 1,334 mg  1,334 mg Oral TID  Moses Echevarria MD   1,334 mg at 01/29/20 1600    sevelamer (RENVELA) tablet 800 mg  800 mg Oral TID  Moses Echevarria MD   800 mg at 01/29/20 1621    calcitRIOL (ROCALTROL) capsule 0.5 mcg  0.5 mcg Oral Daily Moses Ortega MD   0.5 mcg at 01/29/20 1621    atorvastatin (LIPITOR) tablet 40 mg  40 mg Oral Nightly Moses Echevarria MD   40 mg at 01/29/20 2054    sodium chloride flush 0.9 % injection 10 mL  10 mL Intravenous 2 times per day Blondie Money, DO   10 mL at 01/29/20 2055    sodium chloride flush 0.9 % injection 10 mL  10 mL Intravenous PRN Perla Cruz, DO   10 mL at 01/23/20 0430    magnesium hydroxide (MILK OF MAGNESIA) 400 MG/5ML suspension 30 mL  30 mL Oral Daily PRN Blondie Money, DO        ondansetron (ZOFRAN) injection 4 mg  4 mg Intravenous Q6H PRN Perla Cruz, DO   4 mg at 01/29/20 0904    acetaminophen (TYLENOL) tablet 650 mg  650 mg Oral Q6H PRN URBANO [place] - CNP   650 mg at 01/29/20 1505     No Known Allergies  Principal Problem:    AIDS (acquired immune deficiency syndrome) ([facility] [address]  Active Problems:    Acute renal failure (HCC)    Elevated serum creatinine    Pneumonia of both lungs due to infectious organism    Severe malnutrition (HCC)  Resolved Problems:    * No resolved hospital problems. *    Blood pressure 125/89, pulse 115, temperature 97.4 °F (36.3 °C), temperature source Oral, resp. rate 18, height 5' 2\" (1.575 m), weight 144 lb 10 oz (65.6 kg), SpO2 99 %. Subjective:  Symptoms:  Stable. Diet:  Adequate intake. Pain:  He complains of pain that is mild. Objective:  General Appearance:  Comfortable. Vital signs: (most recent): Blood pressure 125/89, pulse 115, temperature 97.4 °F (36.3 °C), temperature source Oral, resp. rate 18, height 5' 2\" (1.575 m), weight 144 lb 10 oz (65.6 kg), SpO2 99 %. Vital signs are normal.  Fever. HEENT: Normal HEENT exam.    Lungs:  Normal effort. There are decreased breath sounds. Heart: Normal rate. Abdomen: Abdomen is soft. Extremities: Decreased range of motion. Neurological: Patient is alert. Pupils:  Pupils are equal, round, and reactive to light. Skin:  Warm. Assessment & Plan  Acute hypoxic resp failure 22/ bilateral pna and PE  -s/p bronch  -CT with RML and RLL PE  -repeat CT Chest with improved pna  -bactrim for prophylaxis for  PJP  -eliquis  -echo pending  -fungal cx pending  -Pred stopped ,zerbaxa, flagyl and bactim and changed as had fevere  HIV(newly diagnosis)  -viral load, genotyp and CD4 count 20[  -viral loasd 1.7mil  -toxo neg  -tenofoviru ,lamivudine, dolutegravir  -TB neg  -MRSA neg  Sepsis 2/2 suspected gram negpna  -PJP PCR not detected  -Bronch with AFB neg  DWIGHT on CKD 4  -nephro and  dialysis  -CT abd with duodenitis  -US neg for hydro  -biopsy FSGS HIV related  Hypothermia  -resolved  HTN  -BB  Anemia  -epogen  Hyponatremia  -will improve with dialysis  Metabolic encephalopathy  -cT head and Chest with improved pna    Family meeting tomorrow,.

## 2020-01-30 NOTE — PROGRESS NOTES
bilateral pneumonia  Fever overnight. Change in mental status-confused. Physical Exam:  Vital Signs: BP (!) 125/92   Pulse 84   Temp 97.7 °F (36.5 °C) (Oral)   Resp 18   Ht 5' 2\" (1.575 m)   Wt 144 lb 10 oz (65.6 kg)   SpO2 100%   BMI 26.45 kg/m²     Gen: alert and oriented X 2  Skin: no stigmata of endocarditis  Wounds: C/D/I  HEMT: AT/NC Oropharynx pink, moist, and without lesions or exudates; dentition in good state of repair  Eyes: PERRLA, EOMI, conjunctiva pink, sclera anicteric. Neck: Supple. Trachea midline. No LAD. Chest: no distress and CTA. Good air movement. Heart: RRR and no MRG. Abd: soft, non-distended, no tenderness, no hepatomegaly. Normoactive bowel sounds. Ext: no clubbing, cyanosis, or edema  Catheter Site: Right internal jugullar CVC, without erythema or tenderness  Neuro: Awake, oriented to place and person. No focal neurologic deficit. Radiologic / Imaging / TESTING  No results found.      Labs:    Recent Results (from the past 24 hour(s))   CBC Auto Differential    Collection Time: 01/30/20  5:50 AM   Result Value Ref Range    WBC 7.1 4.0 - 10.5 K/CU MM    RBC 3.44 (L) 4.6 - 6.2 M/CU MM    Hemoglobin 8.6 (L) 13.5 - 18.0 GM/DL    Hematocrit 28.1 (L) 42 - 52 %    MCV 81.7 78 - 100 FL    MCH 25.0 (L) 27 - 31 PG    MCHC 30.6 (L) 32.0 - 36.0 %    RDW 21.8 (H) 11.7 - 14.9 %    Platelets 772 601 - 320 K/CU MM    MPV 11.3 (H) 7.5 - 11.1 FL    Differential Type AUTOMATED DIFFERENTIAL     Segs Relative 94.7 (H) 36 - 66 %    Lymphocytes % 2.4 (L) 24 - 44 %    Monocytes % 2.2 0 - 4 %    Eosinophils % 0.3 0 - 3 %    Basophils % 0.0 0 - 1 %    Segs Absolute 6.8 K/CU MM    Lymphocytes Absolute 0.2 K/CU MM    Monocytes Absolute 0.2 K/CU MM    Eosinophils Absolute 0.0 K/CU MM    Basophils Absolute 0.0 K/CU MM    Nucleated RBC % 0.0 %    Total Nucleated RBC 0.0 K/CU MM    Total Immature Neutrophil 0.03 K/CU MM    Immature Neutrophil % 0.4 0 - 0.43 %   Renal Function Panel    Collection Time:

## 2020-01-30 NOTE — PROGRESS NOTES
Recent Labs     01/28/20  0630 01/29/20  0520 01/30/20  0550   WBC 8.3 6.3 7.1   HGB 8.5* 7.7* 8.6*    168 167     BMP:    Recent Labs     01/28/20  0630 01/29/20  0520 01/30/20  0550   * 136 130*   K 4.3 4.0 3.6   CL 91* 93* 88*   CO2 24 25 24   BUN 31* 50* 31*   CREATININE 5.3* 7.4* 5.5*   GLUCOSE 95 90 146*     Hepatic:   No results for input(s): AST, ALT, ALB, BILITOT, ALKPHOS in the last 72 hours. Troponin: No results for input(s): TROPONINI in the last 72 hours. BNP: No results for input(s): BNP in the last 72 hours. Lipids: No results for input(s): CHOL, HDL in the last 72 hours.     Invalid input(s): LDLCALCU  ABGs:   Lab Results   Component Value Date    PO2ART 77 01/19/2020    WUM7FMI 39.0 01/19/2020     INR:   Recent Labs     01/28/20  0630 01/28/20  1335 01/29/20  0520   INR 2.73 2.41 1.40     Renal Labs  Albumin:    Lab Results   Component Value Date    LABALBU 2.6 01/30/2020    LABALBU 40 01/15/2020     Calcium:    Lab Results   Component Value Date    CALCIUM 7.8 01/30/2020     Phosphorus:    Lab Results   Component Value Date    PHOS 3.7 01/30/2020     U/A:    Lab Results   Component Value Date    NITRU NEGATIVE 01/15/2020    COLORU YELLOW 01/15/2020    WBCUA 2 01/15/2020    RBCUA 1 01/15/2020    MUCUS RARE 01/15/2020    TRICHOMONAS NONE SEEN 01/15/2020    BACTERIA NEGATIVE 01/15/2020    CLARITYU CLEAR 01/15/2020    SPECGRAV 1.024 01/15/2020    UROBILINOGEN NORMAL 01/15/2020    BILIRUBINUR NEGATIVE 01/15/2020    BLOODU NEGATIVE 01/15/2020    KETUA SMALL 01/15/2020     ABG:    Lab Results   Component Value Date    GLC8FHI 39.0 01/19/2020    PO2ART 77 01/19/2020    AMS0PYE 26.5 01/19/2020     HgBA1c:    Lab Results   Component Value Date    LABA1C 6.1 01/15/2020     Microalbumen/Creatinine ratio:  No components found for: RUCREAT          Objective:   Vitals: BP (!) 118/91   Pulse 74   Temp 97.6 °F (36.4 °C) (Oral)   Resp 26   Ht 5' 2\" (1.575 m)   Wt 144 lb 10 oz (65.6 kg)   SpO2

## 2020-01-30 NOTE — PLAN OF CARE
Problem: Fluid Volume:  Goal: Hemodynamic stability will improve  Description  Hemodynamic stability will improve  1/30/2020 1221 by Stacie Quinones RN  Outcome: Ongoing  1/30/2020 1025 by Minh Wellington RN  Outcome: Ongoing  1/30/2020 0011 by Debra Mcdonnell RN  Outcome: Ongoing  Goal: Ability to maintain a balanced intake and output will improve  Description  Ability to maintain a balanced intake and output will improve  1/30/2020 1221 by Stacie Quinones RN  Outcome: Ongoing  1/30/2020 1025 by Minh Wellington RN  Outcome: Ongoing  1/30/2020 0011 by Debra Mcdonnell RN  Outcome: Ongoing  Goal: Ability to achieve a balanced intake and output will improve  Description  Ability to achieve a balanced intake and output will improve  1/30/2020 1221 by Stacie Quinones RN  Outcome: Ongoing  1/30/2020 1025 by Minh Wellington RN  Outcome: Ongoing  1/30/2020 0011 by Debra Mcdonnell RN  Outcome: Ongoing     Problem: Health Behavior:  Goal: Identification of resources available to assist in meeting health care needs will improve  Description  Identification of resources available to assist in meeting health care needs will improve  1/30/2020 1221 by Stacie Quinones RN  Outcome: Ongoing  1/30/2020 1025 by Minh Wellington RN  Outcome: Ongoing  1/30/2020 0011 by Debra Mcdonnell RN  Outcome: Ongoing  Goal: Ability to identify and utilize available support systems will improve  Description  Ability to identify and utilize available support systems will improve  1/30/2020 1221 by Stacie Quinones RN  Outcome: Ongoing  1/30/2020 1025 by Minh Wellington RN  Outcome: Ongoing  1/30/2020 0011 by Debra Mcdonnell RN  Outcome: Ongoing     Problem: Respiratory:  Goal: Respiratory status will improve  Description  Respiratory status will improve  1/30/2020 1221 by Stacie Quinones RN  Outcome: Ongoing  1/30/2020 1025 by Minh Wellington RN  Outcome: Ongoing  1/30/2020 0011 by Debra Mcdonnell RN  Outcome: Ongoing     Problem: Physical Regulation:  Goal: Ability to maintain clinical measurements within normal limits will improve  Description  Ability to maintain clinical measurements within normal limits will improve  1/30/2020 1221 by La Connor RN  Outcome: Ongoing  1/30/2020 1025 by Macho Bean RN  Outcome: Ongoing  1/30/2020 0011 by Villa Hancock RN  Outcome: Ongoing  Goal: Will show no signs and symptoms of electrolyte imbalance  Description  Will show no signs and symptoms of electrolyte imbalance  1/30/2020 1221 by La Connor RN  Outcome: Ongoing  1/30/2020 1025 by Macho Bean RN  Outcome: Ongoing  1/30/2020 0011 by Villa Hancock RN  Outcome: Ongoing     Problem:  Activity:  Goal: Fatigue will decrease  Description  Fatigue will decrease  1/30/2020 1221 by La Connor RN  Outcome: Ongoing  1/30/2020 1025 by Macho Bean RN  Outcome: Ongoing  1/30/2020 0011 by Villa Hancock RN  Outcome: Ongoing  Goal: Risk for activity intolerance will decrease  Description  Risk for activity intolerance will decrease  1/30/2020 1221 by La Connor RN  Outcome: Ongoing  1/30/2020 1025 by Macho Bean RN  Outcome: Ongoing  1/30/2020 0011 by Villa Hancock RN  Outcome: Ongoing     Problem: Coping:  Goal: Ability to cope will improve  Description  Ability to cope will improve  1/30/2020 1221 by La Connor RN  Outcome: Ongoing  1/30/2020 1025 by Macho Bean RN  Outcome: Ongoing  1/30/2020 0011 by Villa Hancock RN  Outcome: Ongoing     Problem: Nutritional:  Goal: Ability to identify appropriate dietary choices will improve  Description  Ability to identify appropriate dietary choices will improve  1/30/2020 1221 by La Connor RN  Outcome: Ongoing  1/30/2020 1025 by Macho Bean RN  Outcome: Ongoing  1/30/2020 0011 by Villa Hancock RN  Outcome: Ongoing     Problem: Falls - Risk of:  Goal: Will remain free from falls  Description  Will remain free from falls  1/30/2020 1221 by Nayeli Zhang RN  Outcome: Ongoing  1/30/2020 1025 by Julio Morales RN  Outcome: Ongoing  1/30/2020 0011 by Billy Dee RN  Outcome: Ongoing  Goal: Absence of physical injury  Description  Absence of physical injury  1/30/2020 1221 by Nayeli Zhang RN  Outcome: Ongoing  1/30/2020 1025 by Julio Morales RN  Outcome: Ongoing  1/30/2020 0011 by Billy Dee RN  Outcome: Ongoing     Problem: Nutrition  Goal: Optimal nutrition therapy  1/30/2020 1221 by Nayeli Zhang RN  Outcome: Ongoing  1/30/2020 1025 by Julio Morales RN  Outcome: Ongoing  1/30/2020 0011 by Billy Dee RN  Outcome: Ongoing     Problem: Pain:  Goal: Pain level will decrease  Description  Pain level will decrease  1/30/2020 1221 by Nayeli Zhang RN  Outcome: Ongoing  1/30/2020 1025 by Julio Morales RN  Outcome: Ongoing  1/30/2020 0011 by Billy Dee RN  Outcome: Ongoing  Goal: Control of acute pain  Description  Control of acute pain  1/30/2020 1221 by Nayeli Zhang RN  Outcome: Ongoing  1/30/2020 1025 by Julio Morales RN  Outcome: Ongoing  1/30/2020 0011 by Billy Dee RN  Outcome: Ongoing  Goal: Control of chronic pain  Description  Control of chronic pain  1/30/2020 1221 by Nayeli Zhang RN  Outcome: Ongoing  1/30/2020 1025 by Julio Morales RN  Outcome: Ongoing  1/30/2020 0011 by Billy Dee RN  Outcome: Ongoing

## 2020-01-30 NOTE — PLAN OF CARE
Problem: Fluid Volume:  Goal: Hemodynamic stability will improve  Description  Hemodynamic stability will improve  Outcome: Ongoing  Goal: Ability to maintain a balanced intake and output will improve  Description  Ability to maintain a balanced intake and output will improve  Outcome: Ongoing  Goal: Ability to achieve a balanced intake and output will improve  Description  Ability to achieve a balanced intake and output will improve  Outcome: Ongoing     Problem: Health Behavior:  Goal: Identification of resources available to assist in meeting health care needs will improve  Description  Identification of resources available to assist in meeting health care needs will improve  Outcome: Ongoing  Goal: Ability to identify and utilize available support systems will improve  Description  Ability to identify and utilize available support systems will improve  Outcome: Ongoing     Problem: Respiratory:  Goal: Respiratory status will improve  Description  Respiratory status will improve  Outcome: Ongoing     Problem: Physical Regulation:  Goal: Ability to maintain clinical measurements within normal limits will improve  Description  Ability to maintain clinical measurements within normal limits will improve  Outcome: Ongoing  Goal: Will show no signs and symptoms of electrolyte imbalance  Description  Will show no signs and symptoms of electrolyte imbalance  Outcome: Ongoing     Problem:  Activity:  Goal: Fatigue will decrease  Description  Fatigue will decrease  Outcome: Ongoing  Goal: Risk for activity intolerance will decrease  Description  Risk for activity intolerance will decrease  Outcome: Ongoing     Problem: Coping:  Goal: Ability to cope will improve  Description  Ability to cope will improve  Outcome: Ongoing     Problem: Nutritional:  Goal: Ability to identify appropriate dietary choices will improve  Description  Ability to identify appropriate dietary choices will improve  Outcome: Ongoing     Problem: Falls - Risk of:  Goal: Will remain free from falls  Description  Will remain free from falls  Outcome: Ongoing  Goal: Absence of physical injury  Description  Absence of physical injury  Outcome: Ongoing     Problem: Nutrition  Goal: Optimal nutrition therapy  Outcome: Ongoing     Problem: Pain:  Goal: Pain level will decrease  Description  Pain level will decrease  Outcome: Ongoing  Goal: Control of acute pain  Description  Control of acute pain  Outcome: Ongoing  Goal: Control of chronic pain  Description  Control of chronic pain  Outcome: Ongoing

## 2020-01-31 ENCOUNTER — APPOINTMENT (OUTPATIENT)
Dept: MRI IMAGING | Age: 45
DRG: 890 | End: 2020-01-31
Payer: MEDICAID

## 2020-01-31 PROBLEM — F32.2 CURRENT SEVERE EPISODE OF MAJOR DEPRESSIVE DISORDER WITHOUT PSYCHOTIC FEATURES WITHOUT PRIOR EPISODE (HCC): Status: ACTIVE | Noted: 2020-01-31

## 2020-01-31 LAB
ALBUMIN SERPL-MCNC: 2.2 GM/DL (ref 3.4–5)
ALBUMIN SERPL-MCNC: 2.3 GM/DL (ref 3.4–5)
ALP BLD-CCNC: 58 IU/L (ref 40–129)
ALT SERPL-CCNC: 14 U/L (ref 10–40)
ANION GAP SERPL CALCULATED.3IONS-SCNC: 15 MMOL/L (ref 4–16)
AST SERPL-CCNC: 20 IU/L (ref 15–37)
BASOPHILS ABSOLUTE: 0 K/CU MM
BASOPHILS RELATIVE PERCENT: 0 % (ref 0–1)
BILIRUB SERPL-MCNC: 0.1 MG/DL (ref 0–1)
BUN BLDV-MCNC: 41 MG/DL (ref 6–23)
CALCIUM SERPL-MCNC: 8.1 MG/DL (ref 8.3–10.6)
CHLORIDE BLD-SCNC: 87 MMOL/L (ref 99–110)
CMV IGM: <8 AU/ML
CMV IGM: NORMAL AU/ML
CO2: 25 MMOL/L (ref 21–32)
CREAT SERPL-MCNC: 6.9 MG/DL (ref 0.9–1.3)
CYTOMEGALOVIRUS IGG ANTIBODY: 0.55 U/ML
CYTOMEGALOVIRUS IGG ANTIBODY: NORMAL U/ML
DIFFERENTIAL TYPE: ABNORMAL
EOSINOPHILS ABSOLUTE: 0 K/CU MM
EOSINOPHILS RELATIVE PERCENT: 0 % (ref 0–3)
GFR AFRICAN AMERICAN: 11 ML/MIN/1.73M2
GFR NON-AFRICAN AMERICAN: 9 ML/MIN/1.73M2
GLUCOSE BLD-MCNC: 127 MG/DL (ref 70–99)
HCT VFR BLD CALC: 24.2 % (ref 42–52)
HEMOGLOBIN: 7.5 GM/DL (ref 13.5–18)
IMMATURE NEUTROPHIL %: 0.6 % (ref 0–0.43)
LYMPHOCYTES ABSOLUTE: 0.3 K/CU MM
LYMPHOCYTES RELATIVE PERCENT: 8.3 % (ref 24–44)
MCH RBC QN AUTO: 25.3 PG (ref 27–31)
MCHC RBC AUTO-ENTMCNC: 31 % (ref 32–36)
MCV RBC AUTO: 81.5 FL (ref 78–100)
MONOCYTES ABSOLUTE: 0.1 K/CU MM
MONOCYTES RELATIVE PERCENT: 2.5 % (ref 0–4)
PDW BLD-RTO: 21.5 % (ref 11.7–14.9)
PHOSPHORUS: 5.2 MG/DL (ref 2.5–4.9)
PLATELET # BLD: 127 K/CU MM (ref 140–440)
PMV BLD AUTO: 12.7 FL (ref 7.5–11.1)
POTASSIUM SERPL-SCNC: 4.1 MMOL/L (ref 3.5–5.1)
PROCALCITONIN: 23.11
RBC # BLD: 2.97 M/CU MM (ref 4.6–6.2)
SEGMENTED NEUTROPHILS ABSOLUTE COUNT: 2.9 K/CU MM
SEGMENTED NEUTROPHILS RELATIVE PERCENT: 88.6 % (ref 36–66)
SODIUM BLD-SCNC: 127 MMOL/L (ref 135–145)
TOTAL IMMATURE NEUTOROPHIL: 0.02 K/CU MM
TOTAL PROTEIN: 4.5 GM/DL (ref 6.4–8.2)
WBC # BLD: 3.3 K/CU MM (ref 4–10.5)

## 2020-01-31 PROCEDURE — C9113 INJ PANTOPRAZOLE SODIUM, VIA: HCPCS | Performed by: INTERNAL MEDICINE

## 2020-01-31 PROCEDURE — 80053 COMPREHEN METABOLIC PANEL: CPT

## 2020-01-31 PROCEDURE — 82040 ASSAY OF SERUM ALBUMIN: CPT

## 2020-01-31 PROCEDURE — 2060000000 HC ICU INTERMEDIATE R&B

## 2020-01-31 PROCEDURE — 2580000003 HC RX 258: Performed by: INTERNAL MEDICINE

## 2020-01-31 PROCEDURE — 99233 SBSQ HOSP IP/OBS HIGH 50: CPT | Performed by: INTERNAL MEDICINE

## 2020-01-31 PROCEDURE — 6370000000 HC RX 637 (ALT 250 FOR IP): Performed by: HOSPITALIST

## 2020-01-31 PROCEDURE — 85025 COMPLETE CBC W/AUTO DIFF WBC: CPT

## 2020-01-31 PROCEDURE — 6360000002 HC RX W HCPCS: Performed by: NURSE PRACTITIONER

## 2020-01-31 PROCEDURE — 6360000002 HC RX W HCPCS: Performed by: INTERNAL MEDICINE

## 2020-01-31 PROCEDURE — 2580000003 HC RX 258: Performed by: STUDENT IN AN ORGANIZED HEALTH CARE EDUCATION/TRAINING PROGRAM

## 2020-01-31 PROCEDURE — 94761 N-INVAS EAR/PLS OXIMETRY MLT: CPT

## 2020-01-31 PROCEDURE — 6370000000 HC RX 637 (ALT 250 FOR IP): Performed by: INTERNAL MEDICINE

## 2020-01-31 PROCEDURE — 2500000003 HC RX 250 WO HCPCS: Performed by: INTERNAL MEDICINE

## 2020-01-31 PROCEDURE — 84145 PROCALCITONIN (PCT): CPT

## 2020-01-31 PROCEDURE — 84155 ASSAY OF PROTEIN SERUM: CPT

## 2020-01-31 PROCEDURE — 90935 HEMODIALYSIS ONE EVALUATION: CPT

## 2020-01-31 PROCEDURE — 84460 ALANINE AMINO (ALT) (SGPT): CPT

## 2020-01-31 PROCEDURE — 84075 ASSAY ALKALINE PHOSPHATASE: CPT

## 2020-01-31 PROCEDURE — 84450 TRANSFERASE (AST) (SGOT): CPT

## 2020-01-31 PROCEDURE — 36592 COLLECT BLOOD FROM PICC: CPT

## 2020-01-31 PROCEDURE — 80069 RENAL FUNCTION PANEL: CPT

## 2020-01-31 PROCEDURE — 82247 BILIRUBIN TOTAL: CPT

## 2020-01-31 RX ORDER — HEPARIN SODIUM 1000 [USP'U]/ML
3800 INJECTION, SOLUTION INTRAVENOUS; SUBCUTANEOUS
Status: COMPLETED | OUTPATIENT
Start: 2020-01-31 | End: 2020-01-31

## 2020-01-31 RX ORDER — LORAZEPAM 2 MG/ML
0.5 INJECTION INTRAMUSCULAR ONCE
Status: COMPLETED | OUTPATIENT
Start: 2020-01-31 | End: 2020-01-31

## 2020-01-31 RX ORDER — METOCLOPRAMIDE HYDROCHLORIDE 5 MG/ML
10 INJECTION INTRAMUSCULAR; INTRAVENOUS ONCE
Status: COMPLETED | OUTPATIENT
Start: 2020-01-31 | End: 2020-01-31

## 2020-01-31 RX ORDER — LAMIVUDINE 10 MG/ML
50 SOLUTION ORAL DAILY
Status: DISCONTINUED | OUTPATIENT
Start: 2020-01-31 | End: 2020-02-06 | Stop reason: HOSPADM

## 2020-01-31 RX ADMIN — METRONIDAZOLE 500 MG: 500 INJECTION, SOLUTION INTRAVENOUS at 20:56

## 2020-01-31 RX ADMIN — APIXABAN 5 MG: 5 TABLET, FILM COATED ORAL at 20:56

## 2020-01-31 RX ADMIN — MIDODRINE HYDROCHLORIDE 10 MG: 5 TABLET ORAL at 08:44

## 2020-01-31 RX ADMIN — APIXABAN 5 MG: 5 TABLET, FILM COATED ORAL at 08:45

## 2020-01-31 RX ADMIN — CEFTOLOZANE AND TAZOBACTAM 450 MG: 1; .5 INJECTION, POWDER, LYOPHILIZED, FOR SOLUTION INTRAVENOUS at 08:49

## 2020-01-31 RX ADMIN — MIDODRINE HYDROCHLORIDE 10 MG: 5 TABLET ORAL at 11:40

## 2020-01-31 RX ADMIN — CALCITRIOL CAPSULES 0.25 MCG 0.5 MCG: 0.25 CAPSULE ORAL at 08:45

## 2020-01-31 RX ADMIN — LINEZOLID 600 MG: 600 INJECTION, SOLUTION INTRAVENOUS at 23:49

## 2020-01-31 RX ADMIN — CEFTOLOZANE AND TAZOBACTAM 450 MG: 1; .5 INJECTION, POWDER, LYOPHILIZED, FOR SOLUTION INTRAVENOUS at 23:50

## 2020-01-31 RX ADMIN — PANTOPRAZOLE SODIUM 40 MG: 40 INJECTION, POWDER, FOR SOLUTION INTRAVENOUS at 08:46

## 2020-01-31 RX ADMIN — SEVELAMER CARBONATE 800 MG: 800 TABLET, FILM COATED ORAL at 11:39

## 2020-01-31 RX ADMIN — ATORVASTATIN CALCIUM 40 MG: 40 TABLET, FILM COATED ORAL at 20:56

## 2020-01-31 RX ADMIN — SODIUM CHLORIDE, PRESERVATIVE FREE 10 ML: 5 INJECTION INTRAVENOUS at 22:36

## 2020-01-31 RX ADMIN — SODIUM CHLORIDE, PRESERVATIVE FREE 10 ML: 5 INJECTION INTRAVENOUS at 20:57

## 2020-01-31 RX ADMIN — EPOETIN ALFA-EPBX 10000 UNITS: 10000 INJECTION, SOLUTION INTRAVENOUS; SUBCUTANEOUS at 17:02

## 2020-01-31 RX ADMIN — IRON SUCROSE 50 MG: 20 INJECTION, SOLUTION INTRAVENOUS at 17:01

## 2020-01-31 RX ADMIN — METRONIDAZOLE 500 MG: 500 INJECTION, SOLUTION INTRAVENOUS at 11:34

## 2020-01-31 RX ADMIN — METOCLOPRAMIDE 10 MG: 5 INJECTION, SOLUTION INTRAMUSCULAR; INTRAVENOUS at 22:35

## 2020-01-31 RX ADMIN — LINEZOLID 600 MG: 600 INJECTION, SOLUTION INTRAVENOUS at 12:52

## 2020-01-31 RX ADMIN — SODIUM CHLORIDE, PRESERVATIVE FREE 10 ML: 5 INJECTION INTRAVENOUS at 23:49

## 2020-01-31 RX ADMIN — CEFTOLOZANE AND TAZOBACTAM 450 MG: 1; .5 INJECTION, POWDER, LYOPHILIZED, FOR SOLUTION INTRAVENOUS at 01:16

## 2020-01-31 RX ADMIN — SEVELAMER CARBONATE 800 MG: 800 TABLET, FILM COATED ORAL at 08:45

## 2020-01-31 RX ADMIN — SODIUM CHLORIDE, PRESERVATIVE FREE 10 ML: 5 INJECTION INTRAVENOUS at 22:02

## 2020-01-31 RX ADMIN — METOPROLOL TARTRATE 50 MG: 50 TABLET, FILM COATED ORAL at 20:56

## 2020-01-31 RX ADMIN — CALCIUM ACETATE 1334 MG: 667 CAPSULE ORAL at 08:45

## 2020-01-31 RX ADMIN — CALCIUM ACETATE 1334 MG: 667 CAPSULE ORAL at 11:40

## 2020-01-31 RX ADMIN — METRONIDAZOLE 500 MG: 500 INJECTION, SOLUTION INTRAVENOUS at 07:08

## 2020-01-31 RX ADMIN — HEPARIN SODIUM 3800 UNITS: 1000 INJECTION, SOLUTION INTRAVENOUS; SUBCUTANEOUS at 19:30

## 2020-01-31 RX ADMIN — SULFAMETHOXAZOLE AND TRIMETHOPRIM 328 MG: 80; 16 INJECTION, SOLUTION, CONCENTRATE INTRAVENOUS at 10:08

## 2020-01-31 RX ADMIN — LORAZEPAM 0.5 MG: 2 INJECTION, SOLUTION INTRAMUSCULAR; INTRAVENOUS at 22:35

## 2020-01-31 RX ADMIN — PREDNISONE 40 MG: 10 TABLET ORAL at 08:45

## 2020-01-31 RX ADMIN — LINEZOLID 600 MG: 600 INJECTION, SOLUTION INTRAVENOUS at 01:16

## 2020-01-31 ASSESSMENT — PAIN SCALES - GENERAL
PAINLEVEL_OUTOF10: 0

## 2020-01-31 NOTE — PROGRESS NOTES
[birth year]    History:? Interval history noted, bilateral pneumonia  Afebrile, feels better  Physical Exam:  Vital Signs: /73   Pulse 63   Temp 97.3 °F (36.3 °C) (Oral)   Resp 17   Ht 5' 2\" (1.575 m)   Wt 141 lb 15.6 oz (64.4 kg)   SpO2 98%   BMI 25.97 kg/m²     Gen: alert and oriented X 2  Skin: no stigmata of endocarditis  Wounds: C/D/I  HEMT: AT/NC Oropharynx pink, moist, and without lesions or exudates; dentition in good state of repair  Eyes: PERRLA, EOMI, conjunctiva pink, sclera anicteric. Neck: Supple. Trachea midline. No LAD. Chest: no distress and CTA. Good air movement. Heart: RRR and no MRG. Abd: soft, non-distended, no tenderness, no hepatomegaly. Normoactive bowel sounds. Ext: no clubbing, cyanosis, or edema  Catheter Site: Right internal jugullar CVC, without erythema or tenderness  Neuro: Awake, oriented to place and person. No focal neurologic deficit. Radiologic / Imaging / TESTING  No results found.      Labs:    Recent Results (from the past 24 hour(s))   CBC Auto Differential    Collection Time: 01/31/20  7:10 AM   Result Value Ref Range    WBC 3.3 (L) 4.0 - 10.5 K/CU MM    RBC 2.97 (L) 4.6 - 6.2 M/CU MM    Hemoglobin 7.5 (L) 13.5 - 18.0 GM/DL    Hematocrit 24.2 (L) 42 - 52 %    MCV 81.5 78 - 100 FL    MCH 25.3 (L) 27 - 31 PG    MCHC 31.0 (L) 32.0 - 36.0 %    RDW 21.5 (H) 11.7 - 14.9 %    Platelets 059 (L) 653 - 440 K/CU MM    MPV 12.7 (H) 7.5 - 11.1 FL    Immature Neutrophil % 0.6 (H) 0 - 0.43 %    Segs Relative 88.6 (H) 36 - 66 %    Eosinophils % 0.0 0 - 3 %    Basophils % 0.0 0 - 1 %    Lymphocytes % 8.3 (L) 24 - 44 %    Monocytes % 2.5 0 - 4 %    Total Immature Neutrophil 0.02 K/CU MM    Segs Absolute 2.9 K/CU MM    Eosinophils Absolute 0.0 K/CU MM    Basophils Absolute 0.0 K/CU MM    Lymphocytes Absolute 0.3 K/CU MM    Monocytes Absolute 0.1 K/CU MM    Differential Type AUTOMATED DIFFERENTIAL    Renal Function Panel    Collection Time: 01/31/20  7:10 AM   Result Value Ref Range    Sodium 127 (L) 135 - 145 MMOL/L    Potassium 4.1 3.5 - 5.1 MMOL/L    Chloride 87 (L) 99 - 110 mMol/L    CO2 25 21 - 32 MMOL/L    Anion Gap 15 4 - 16    BUN 41 (H) 6 - 23 MG/DL    CREATININE 6.9 (H) 0.9 - 1.3 MG/DL    Glucose 127 (H) 70 - 99 MG/DL    Calcium 8.1 (L) 8.3 - 10.6 MG/DL    GFR Non- 9 (L) >60 mL/min/1.73m2    GFR  11 (L) >60 mL/min/1.73m2    Alb 2.2 (L) 3.4 - 5.0 GM/DL    Phosphorus 5.2 (H) 2.5 - 4.9 MG/DL     CULTURE results: Invalid input(s): BLOOD CULTURE,  URINE CULTURE, SURGICAL CULTURE    Diagnosis:  Patient Active Problem List   Diagnosis    Acute renal failure (HCC)    Elevated serum creatinine    Pneumonia of both lungs due to infectious organism    AIDS (acquired immune deficiency syndrome) (HCC)    Severe malnutrition (HCC)       Active Problems  Principal Problem:    AIDS (acquired immune deficiency syndrome) (HCC)  Active Problems:    Acute renal failure (HCC)    Elevated serum creatinine    Pneumonia of both lungs due to infectious organism    Severe malnutrition (HCC)  Resolved Problems:    * No resolved hospital problems.  *    Electronically signed by: Electronically signed by Alexandro Harley MD on 1/31/2020 at 1:28 PM

## 2020-01-31 NOTE — PROGRESS NOTES
Nutrition Assessment    Type and Reason for Visit: Reassess    Nutrition Recommendations:   · Continue current diet and supplements  · Please start an appetite stimulant    Nutrition Assessment: Pt is refusing his meals and supplements. He appears to be more confused today. Pt would likely not tolerate an NG. Suggest starting an appetite stimulant to help the pt meet his estimated needs. Malnutrition Assessment:  · Malnutrition Status: Meets the criteria for severe malnutrition  · Context: Chronic illness  · Findings of the 6 clinical characteristics of malnutrition (Minimum of 2 out of 6 clinical characteristics is required to make the diagnosis of moderate or severe Protein Calorie Malnutrition based on AND/ASPEN Guidelines):  1. Energy Intake-Less than or equal to 50% of estimated energy requirement, Greater than or equal to 1 month    2. Weight Loss-Unable to assess, unable to assess  3. Fat Loss-Severe subcutaneous fat loss, Orbital  4. Muscle Loss-Severe muscle mass loss, Clavicles (pectoralis and deltoids)  5. Fluid Accumulation-No significant fluid accumulation, Generalized  6.   Strength-Not measured    Nutrition Risk Level: High    Nutrient Needs:  · Estimated Daily Total Kcal: 1292-0115 based on MSJ  · Estimated Daily Protein (g): 50-75(1-1.5 g/kg IBW )  · Estimated Daily Total Fluid (ml/day): 1 mL/kcal or per nephrology     Nutrition Diagnosis:   · Problem: Severe malnutrition, In context of chronic illness  · Etiology: related to Catabolic illness, Psychological cause/life stress     Signs and symptoms:  as evidenced by Severe loss of subcutaneous fat, Severe muscle loss, Intake 0-25%    Objective Information:  · Wound Type: None  · Current Nutrition Therapies:  · Oral Diet Orders: Renal   · Oral Diet intake: 1-25%  · Oral Nutrition Supplement (ONS) Orders: Renal Oral Supplement  · ONS intake: 1-25%, Refused  · Anthropometric Measures:  · Ht: 5' 2\" (157.5 cm)   · Current Body Wt: 141 lb (64 kg)  · Admission Body Wt: 149 lb (67.6 kg)  · Usual Body Wt: 170 lb (77.1 kg)  · % Weight Change: -12% in on week likely related to fluid loss  · Ideal Body Wt: 118 lb (53.5 kg), % Ideal Body 119%  · BMI Classification: BMI 25.0 - 29.9 Overweight    Nutrition Interventions:   Continue current diet, Continue current ONS  Continued Inpatient Monitoring, Education Not Indicated, Coordination of Care    Nutrition Evaluation:   · Evaluation: No progress toward goals   · Goals: pt will consume greater than 75% of his meals and supplements    · Monitoring: Meal Intake, Weight, Pertinent Labs, Supplement Intake      Electronically signed by Yanni Caraballo RD, LD on 7/25/93 at 12:11 PM    Contact Number: [phone]

## 2020-01-31 NOTE — PROGRESS NOTES
Zoë Alvarez is a 45 y.o. male patient alert and and feels a little short of breath  Current Facility-Administered Medications   Medication Dose Route Frequency Provider Last Rate Last Dose    iron sucrose (VENOFER) injection 50 mg  50 mg Intravenous Once in dialysis Cesar Santacruz MD        epoetin david-epbx (RETACRIT) injection 10,000 Units  10,000 Units Intravenous Once in dialysis Cesar Santacruz MD        predniSONE (DELTASONE) tablet 40 mg  40 mg Oral Daily Pro Gray MD   40 mg at 01/30/20 1938    acetaminophen (TYLENOL) suppository 650 mg  650 mg Rectal Q4H PRN Oneda Breaker, APRN - CNP   650 mg at 01/29/20 0413    chlorproMAZINE (THORAZINE) tablet 25 mg  25 mg Oral Once Cesar Santacruz MD   Stopped at 01/29/20 0928    linezolid (ZYVOX) IVPB 600 mg  600 mg Intravenous Q12H Pro Gray MD   Stopped at 01/31/20 0222    metronidazole (FLAGYL) 500 mg in NaCl 100 mL IVPB premix  500 mg Intravenous Q8H Pro Gray  mL/hr at 01/31/20 0708 500 mg at 01/31/20 0708    sulfamethoxazole-trimethoprim (BACTRIM) 328 mg in dextrose 5 % 500 mL IVPB  5 mg/kg Intravenous Daily Pro Gray MD   Stopped at 01/30/20 1055    apixaban (ELIQUIS) tablet 5 mg  5 mg Oral BID Lay Carpenter MD   5 mg at 01/30/20 2244    ceftolozane-tazobactam (ZERBAXA) 450 mg in dextrose 5 % 100 mL IVPB  450 mg Intravenous Candelaria Bhatti MD   Stopped at 01/31/20 0213    lamiVUDine (EPIVIR) 10 MG/ML solution 50 mg  50 mg Oral Daily Pro Gray MD   50 mg at 01/30/20 1621    tenofovir (VIREAD) tablet 300 mg  300 mg Oral Q7 Days Pro Gray MD   300 mg at 01/27/20 2320    dolutegravir sodium (TIVICAY) tablet 50 mg  50 mg Oral Daily Pro Gray MD   50 mg at 01/30/20 1622    ipratropium-albuterol (DUONEB) nebulizer solution 1 ampule  1 ampule Inhalation PRN Cesar Santacruz MD   1 ampule at 01/27/20 0044    midodrine (PROAMATINE) tablet 10 mg  10 mg Oral TID  Moses Baumann MD   10 mg at 01/30/20 1618    pantoprazole (PROTONIX) injection 40 mg  40 mg Intravenous Daily Mendel Mailman, MD   40 mg at 01/30/20 0900    diphenhydrAMINE (BENADRYL) tablet 50 mg  50 mg Oral Nightly PRN Ned Regulus, DO   50 mg at 01/30/20 0020    hydrOXYzine (VISTARIL) capsule 25 mg  25 mg Oral TID PRN Ned Regulus, DO   25 mg at 01/30/20 0020    sodium chloride flush 0.9 % injection 10 mL  10 mL Intravenous PRN Sosa Cuellar MD        calcium gluconate 1 g in dextrose 5 % 100 mL IVPB  1 g Intravenous PRN Henny Stallworth MD   Stopped at 01/21/20 1920    Or    calcium gluconate 2 g in dextrose 5 % 100 mL IVPB  2 g Intravenous PRN Henny Stallworth MD        Or   Jermaine Robi calcium gluconate 3 g in dextrose 5 % 100 mL IVPB  3 g Intravenous PRN Henny Stallworth MD        Or    calcium gluconate 4 g in dextrose 5 % 90 mL IVPB  4 g Intravenous PRN Henny Stallworth MD        0.9 % sodium chloride infusion 250 mL  250 mL Intravenous Once Juju Dockery, APRN - CNP        LORazepam (ATIVAN) injection 0.5 mg  0.5 mg Intravenous Once Henny Stallworth MD        metoprolol tartrate (LOPRESSOR) tablet 50 mg  50 mg Oral BID Mendel Mailman, MD   50 mg at 01/30/20 2245    calcium acetate (PHOSLO) capsule 1,334 mg  1,334 mg Oral TID  Moses Dc MD   1,334 mg at 01/30/20 1617    sevelamer (RENVELA) tablet 800 mg  800 mg Oral TID  Moses Dc MD   800 mg at 01/30/20 1617    calcitRIOL (ROCALTROL) capsule 0.5 mcg  0.5 mcg Oral Daily Moses Steele MD   0.5 mcg at 01/30/20 0858    atorvastatin (LIPITOR) tablet 40 mg  40 mg Oral Nightly Moses Dc MD   40 mg at 01/30/20 2244    sodium chloride flush 0.9 % injection 10 mL  10 mL Intravenous 2 times per day Ned Major, DO   10 mL at 01/30/20 2244    sodium chloride flush 0.9 % injection 10 mL  10 mL Intravenous PRN Perla Cruz DO   10 mL at 01/23/20 0430    magnesium hydroxide (MILK OF neg  -MRSA neg  HIV cardiomyopathy  -EF 25-30% and not a candidate for ACE/ ARB  Sepsis 2/2 suspected gram negpna  -PJP PCR not detected  -Bronch with AFB neg  DWIGHT on CKD 4  -nephro and  dialysis  -CT abd with duodenitis  -US neg for hydro  -biopsy FSGS HIV related  Hypothermia  -resolved  HTN  -BB  Anemia with pancytopnenia  -epogen  Hyponatremia  -will improve with dialysis  Metabolic encephalopathy  -cT head and Chest with improved pna  -MRI brain     Discussed plan and consider transfer to [facility] but MRI brain pending  Chelsy Ames MD  1/31/2020

## 2020-01-31 NOTE — PROGRESS NOTES
hour   Intake 1400 ml   Output --   Net 1400 ml     CBC:   Recent Labs     01/29/20  0520 01/30/20  0550 01/31/20  0710   WBC 6.3 7.1 3.3*   HGB 7.7* 8.6* 7.5*    167 127*     BMP:    Recent Labs     01/29/20  0520 01/30/20  0550 01/31/20  0710    130* 127*   K 4.0 3.6 4.1   CL 93* 88* 87*   CO2 25 24 25   BUN 50* 31* 41*   CREATININE 7.4* 5.5* 6.9*   GLUCOSE 90 146* 127*     Hepatic:   No results for input(s): AST, ALT, ALB, BILITOT, ALKPHOS in the last 72 hours. Troponin: No results for input(s): TROPONINI in the last 72 hours. BNP: No results for input(s): BNP in the last 72 hours. Lipids: No results for input(s): CHOL, HDL in the last 72 hours.     Invalid input(s): LDLCALCU  ABGs:   Lab Results   Component Value Date    PO2ART 77 01/19/2020    IHZ4JKF 39.0 01/19/2020     INR:   Recent Labs     01/28/20  1335 01/29/20  0520   INR 2.41 1.40     Renal Labs  Albumin:    Lab Results   Component Value Date    LABALBU 2.2 01/31/2020    LABALBU 40 01/15/2020     Calcium:    Lab Results   Component Value Date    CALCIUM 8.1 01/31/2020     Phosphorus:    Lab Results   Component Value Date    PHOS 5.2 01/31/2020     U/A:    Lab Results   Component Value Date    NITRU NEGATIVE 01/15/2020    COLORU YELLOW 01/15/2020    WBCUA 2 01/15/2020    RBCUA 1 01/15/2020    MUCUS RARE 01/15/2020    TRICHOMONAS NONE SEEN 01/15/2020    BACTERIA NEGATIVE 01/15/2020    CLARITYU CLEAR 01/15/2020    SPECGRAV 1.024 01/15/2020    UROBILINOGEN NORMAL 01/15/2020    BILIRUBINUR NEGATIVE 01/15/2020    BLOODU NEGATIVE 01/15/2020    KETUA SMALL 01/15/2020     ABG:    Lab Results   Component Value Date    OXJ2GXG 39.0 01/19/2020    PO2ART 77 01/19/2020    SWU3PCO 26.5 01/19/2020     HgBA1c:    Lab Results   Component Value Date    LABA1C 6.1 01/15/2020     Microalbumen/Creatinine ratio:  No components found for: RUCREAT          Objective:   Vitals: /73   Pulse 63   Temp 97.3 °F (36.3 °C) (Oral)   Resp 17   Ht 5' 2\" (1.575 m) Wt 141 lb 15.6 oz (64.4 kg)   SpO2 98%   BMI 25.97 kg/m²   General appearance: awake with sitter  HEENT: Head: Normal, normocephalic, atraumatic.   Neck: supple, symmetrical, trachea midline  Lungs: diminished breath sounds bilaterally  Heart: S1, S2 normal  Abdomen: abnormal findings:  soft nt  Extremities: edema trace tunnel hd catheter  Neurologic: Mental status: alertness: awake some confusion      Patient Active Problem List:     Acute renal failure (HCC)    Assessment and Plan:      IMP:  esrd from hivan  Hyperkalemia/hyponatremia  confusion  PE with sob  Anemia  hiv +  Hep B prior infection  hypotension  chf ef 25-30%    Plan     Doing hd today and maintain mwf with tunnel hd catheter  Correct K and na with dialysis  Affect appear slight better today fu mri and agree with osu if not improve  On anticoagulation and monitor platlet count  prbc as need if hb < 7 and use epo  Maintain hiv meds per id  Dialysis in isolation  bp low stable  Monitor cardiac status and will need fu cardio  Will monitor and working on outpt options with                Emiliano Rutherford MD

## 2020-02-01 ENCOUNTER — APPOINTMENT (OUTPATIENT)
Dept: MRI IMAGING | Age: 45
DRG: 890 | End: 2020-02-01
Payer: MEDICAID

## 2020-02-01 LAB
ALBUMIN SERPL-MCNC: 2.6 GM/DL (ref 3.4–5)
ANION GAP SERPL CALCULATED.3IONS-SCNC: 14 MMOL/L (ref 4–16)
BASOPHILS ABSOLUTE: 0 K/CU MM
BASOPHILS RELATIVE PERCENT: 0 % (ref 0–1)
BLASTOMYCES DERMATITIDIS: NORMAL
BLASTOMYCES DERMATITIDIS: NORMAL
BUN BLDV-MCNC: 22 MG/DL (ref 6–23)
CALCIUM SERPL-MCNC: 7.8 MG/DL (ref 8.3–10.6)
CHLORIDE BLD-SCNC: 90 MMOL/L (ref 99–110)
CO2: 28 MMOL/L (ref 21–32)
CREAT SERPL-MCNC: 4.8 MG/DL (ref 0.9–1.3)
DIFFERENTIAL TYPE: ABNORMAL
EOSINOPHILS ABSOLUTE: 0 K/CU MM
EOSINOPHILS RELATIVE PERCENT: 0.2 % (ref 0–3)
GFR AFRICAN AMERICAN: 16 ML/MIN/1.73M2
GFR NON-AFRICAN AMERICAN: 13 ML/MIN/1.73M2
GLUCOSE BLD-MCNC: 107 MG/DL (ref 70–99)
HCT VFR BLD CALC: 25.6 % (ref 42–52)
HEMOGLOBIN: 7.8 GM/DL (ref 13.5–18)
HIGH SENSITIVE C-REACTIVE PROTEIN: 40.8 MG/L
IMMATURE NEUTROPHIL %: 0.6 % (ref 0–0.43)
LYMPHOCYTES ABSOLUTE: 0.3 K/CU MM
LYMPHOCYTES RELATIVE PERCENT: 4.8 % (ref 24–44)
MCH RBC QN AUTO: 25 PG (ref 27–31)
MCHC RBC AUTO-ENTMCNC: 30.5 % (ref 32–36)
MCV RBC AUTO: 82.1 FL (ref 78–100)
MONOCYTES ABSOLUTE: 0.2 K/CU MM
MONOCYTES RELATIVE PERCENT: 2.9 % (ref 0–4)
NUCLEATED RBC %: 0 %
PDW BLD-RTO: 21.8 % (ref 11.7–14.9)
PHOSPHORUS: 2.8 MG/DL (ref 2.5–4.9)
PLATELET # BLD: 178 K/CU MM (ref 140–440)
PMV BLD AUTO: 12.4 FL (ref 7.5–11.1)
POTASSIUM SERPL-SCNC: 3.7 MMOL/L (ref 3.5–5.1)
RBC # BLD: 3.12 M/CU MM (ref 4.6–6.2)
SEGMENTED NEUTROPHILS ABSOLUTE COUNT: 6 K/CU MM
SEGMENTED NEUTROPHILS RELATIVE PERCENT: 91.5 % (ref 36–66)
SODIUM BLD-SCNC: 132 MMOL/L (ref 135–145)
SOURCE: NORMAL
TOTAL IMMATURE NEUTOROPHIL: 0.04 K/CU MM
TOTAL NUCLEATED RBC: 0 K/CU MM
WBC # BLD: 6.5 K/CU MM (ref 4–10.5)

## 2020-02-01 PROCEDURE — 85025 COMPLETE CBC W/AUTO DIFF WBC: CPT

## 2020-02-01 PROCEDURE — 6370000000 HC RX 637 (ALT 250 FOR IP): Performed by: HOSPITALIST

## 2020-02-01 PROCEDURE — 6360000002 HC RX W HCPCS: Performed by: INTERNAL MEDICINE

## 2020-02-01 PROCEDURE — 80069 RENAL FUNCTION PANEL: CPT

## 2020-02-01 PROCEDURE — 2580000003 HC RX 258: Performed by: INTERNAL MEDICINE

## 2020-02-01 PROCEDURE — 6370000000 HC RX 637 (ALT 250 FOR IP): Performed by: INTERNAL MEDICINE

## 2020-02-01 PROCEDURE — 80053 COMPREHEN METABOLIC PANEL: CPT

## 2020-02-01 PROCEDURE — C9113 INJ PANTOPRAZOLE SODIUM, VIA: HCPCS | Performed by: INTERNAL MEDICINE

## 2020-02-01 PROCEDURE — 6370000000 HC RX 637 (ALT 250 FOR IP): Performed by: NURSE PRACTITIONER

## 2020-02-01 PROCEDURE — 2500000003 HC RX 250 WO HCPCS: Performed by: INTERNAL MEDICINE

## 2020-02-01 PROCEDURE — 2060000000 HC ICU INTERMEDIATE R&B

## 2020-02-01 PROCEDURE — 2580000003 HC RX 258: Performed by: STUDENT IN AN ORGANIZED HEALTH CARE EDUCATION/TRAINING PROGRAM

## 2020-02-01 PROCEDURE — 86141 C-REACTIVE PROTEIN HS: CPT

## 2020-02-01 PROCEDURE — 70551 MRI BRAIN STEM W/O DYE: CPT

## 2020-02-01 RX ADMIN — SERTRALINE HYDROCHLORIDE 50 MG: 50 TABLET ORAL at 08:17

## 2020-02-01 RX ADMIN — PREDNISONE 40 MG: 10 TABLET ORAL at 08:16

## 2020-02-01 RX ADMIN — LINEZOLID 600 MG: 600 INJECTION, SOLUTION INTRAVENOUS at 12:22

## 2020-02-01 RX ADMIN — LINEZOLID 600 MG: 600 INJECTION, SOLUTION INTRAVENOUS at 23:56

## 2020-02-01 RX ADMIN — CALCITRIOL CAPSULES 0.25 MCG 0.5 MCG: 0.25 CAPSULE ORAL at 08:16

## 2020-02-01 RX ADMIN — MIDODRINE HYDROCHLORIDE 10 MG: 5 TABLET ORAL at 16:14

## 2020-02-01 RX ADMIN — SEVELAMER CARBONATE 800 MG: 800 TABLET, FILM COATED ORAL at 08:15

## 2020-02-01 RX ADMIN — SEVELAMER CARBONATE 800 MG: 800 TABLET, FILM COATED ORAL at 12:22

## 2020-02-01 RX ADMIN — CALCIUM ACETATE 1334 MG: 667 CAPSULE ORAL at 16:15

## 2020-02-01 RX ADMIN — MIDODRINE HYDROCHLORIDE 10 MG: 5 TABLET ORAL at 08:16

## 2020-02-01 RX ADMIN — SODIUM CHLORIDE, PRESERVATIVE FREE 10 ML: 5 INJECTION INTRAVENOUS at 08:21

## 2020-02-01 RX ADMIN — SULFAMETHOXAZOLE AND TRIMETHOPRIM 328 MG: 80; 16 INJECTION, SOLUTION, CONCENTRATE INTRAVENOUS at 10:27

## 2020-02-01 RX ADMIN — SODIUM CHLORIDE, PRESERVATIVE FREE 10 ML: 5 INJECTION INTRAVENOUS at 23:56

## 2020-02-01 RX ADMIN — CEFTOLOZANE AND TAZOBACTAM 450 MG: 1; .5 INJECTION, POWDER, LYOPHILIZED, FOR SOLUTION INTRAVENOUS at 16:15

## 2020-02-01 RX ADMIN — CALCIUM ACETATE 1334 MG: 667 CAPSULE ORAL at 08:15

## 2020-02-01 RX ADMIN — APIXABAN 5 MG: 5 TABLET, FILM COATED ORAL at 20:20

## 2020-02-01 RX ADMIN — SEVELAMER CARBONATE 800 MG: 800 TABLET, FILM COATED ORAL at 16:14

## 2020-02-01 RX ADMIN — CEFTOLOZANE AND TAZOBACTAM 450 MG: 1; .5 INJECTION, POWDER, LYOPHILIZED, FOR SOLUTION INTRAVENOUS at 23:56

## 2020-02-01 RX ADMIN — PANTOPRAZOLE SODIUM 40 MG: 40 INJECTION, POWDER, FOR SOLUTION INTRAVENOUS at 10:27

## 2020-02-01 RX ADMIN — METOPROLOL TARTRATE 50 MG: 50 TABLET, FILM COATED ORAL at 08:16

## 2020-02-01 RX ADMIN — APIXABAN 5 MG: 5 TABLET, FILM COATED ORAL at 08:17

## 2020-02-01 RX ADMIN — METRONIDAZOLE 500 MG: 500 INJECTION, SOLUTION INTRAVENOUS at 19:01

## 2020-02-01 RX ADMIN — ATORVASTATIN CALCIUM 40 MG: 40 TABLET, FILM COATED ORAL at 20:21

## 2020-02-01 RX ADMIN — METOPROLOL TARTRATE 50 MG: 50 TABLET, FILM COATED ORAL at 20:21

## 2020-02-01 RX ADMIN — METRONIDAZOLE 500 MG: 500 INJECTION, SOLUTION INTRAVENOUS at 11:33

## 2020-02-01 RX ADMIN — CEFTOLOZANE AND TAZOBACTAM 450 MG: 1; .5 INJECTION, POWDER, LYOPHILIZED, FOR SOLUTION INTRAVENOUS at 10:26

## 2020-02-01 RX ADMIN — SODIUM CHLORIDE, PRESERVATIVE FREE 10 ML: 5 INJECTION INTRAVENOUS at 20:20

## 2020-02-01 RX ADMIN — METRONIDAZOLE 500 MG: 500 INJECTION, SOLUTION INTRAVENOUS at 03:21

## 2020-02-01 RX ADMIN — CALCIUM ACETATE 1334 MG: 667 CAPSULE ORAL at 12:22

## 2020-02-01 RX ADMIN — DOLUTEGRAVIR SODIUM 50 MG: 50 TABLET, FILM COATED ORAL at 22:18

## 2020-02-01 ASSESSMENT — PAIN SCALES - GENERAL
PAINLEVEL_OUTOF10: 0

## 2020-02-01 NOTE — PROGRESS NOTES
1864  Gross per 24 hour   Intake 1260 ml   Output 3825 ml   Net -2565 ml     CBC:   Recent Labs     01/30/20  0550 01/31/20  0710 02/01/20  0620   WBC 7.1 3.3* 6.5   HGB 8.6* 7.5* 7.8*    127* 178     BMP:    Recent Labs     01/30/20  0550 01/31/20  0710 02/01/20  0620   * 127* 132*   K 3.6 4.1 3.7   CL 88* 87* 90*   CO2 24 25 28   BUN 31* 41* 22   CREATININE 5.5* 6.9* 4.8*   GLUCOSE 146* 127* 107*     Hepatic:   Recent Labs     01/31/20  0710   AST 20   ALT 14   BILITOT 0.1   ALKPHOS 58     Troponin: No results for input(s): TROPONINI in the last 72 hours. BNP: No results for input(s): BNP in the last 72 hours. Lipids: No results for input(s): CHOL, HDL in the last 72 hours. Invalid input(s): LDLCALCU  ABGs:   Lab Results   Component Value Date    PO2ART 77 01/19/2020    KWL2WHF 39.0 01/19/2020     INR:   No results for input(s): INR in the last 72 hours.   Renal Labs  Albumin:    Lab Results   Component Value Date    LABALBU 2.6 02/01/2020    LABALBU 40 01/15/2020     Calcium:    Lab Results   Component Value Date    CALCIUM 7.8 02/01/2020     Phosphorus:    Lab Results   Component Value Date    PHOS 2.8 02/01/2020     U/A:    Lab Results   Component Value Date    NITRU NEGATIVE 01/15/2020    COLORU YELLOW 01/15/2020    WBCUA 2 01/15/2020    RBCUA 1 01/15/2020    MUCUS RARE 01/15/2020    TRICHOMONAS NONE SEEN 01/15/2020    BACTERIA NEGATIVE 01/15/2020    CLARITYU CLEAR 01/15/2020    SPECGRAV 1.024 01/15/2020    UROBILINOGEN NORMAL 01/15/2020    BILIRUBINUR NEGATIVE 01/15/2020    BLOODU NEGATIVE 01/15/2020    KETUA SMALL 01/15/2020     ABG:    Lab Results   Component Value Date    POK8TOP 39.0 01/19/2020    PO2ART 77 01/19/2020    ZPB5IKM 26.5 01/19/2020     HgBA1c:    Lab Results   Component Value Date    LABA1C 6.1 01/15/2020     Microalbumen/Creatinine ratio:  No components found for: RUCREAT          Objective:   Vitals: /76   Pulse 69   Temp 97.7 °F (36.5 °C) (Oral)   Resp 19   Ht 5' 2\" (1.575 m)   Wt 214 lb 11.7 oz (97.4 kg)   SpO2 100%   BMI 39.27 kg/m²   General appearance: awake with sitter  HEENT: Head: Normal, normocephalic, atraumatic. Neck: supple, symmetrical, trachea midline  Lungs: diminished breath sounds bilaterally  Heart: S1, S2 normal  Abdomen: abnormal findings:  soft nt  Extremities: edema trace tunnel hd catheter  Neurologic: Mental status: alertness: awake  Unsure confused      Patient Active Problem List:     Acute renal failure (HCC)    Assessment and Plan:      IMP:  esrd from hivan  Hyperkalemia/hyponatremia  confusion  PE with sob  Anemia  hiv +  Hep B prior infection  hypotension  chf ef 25-30%    Plan     Doing dialysis mwf and monitor and still possible can recover some function  K and Na stbale with dialysis  Affect still vary and mri no acute disease and monitor with meds  On eliquis monitor hb  Maintain hiv meds with ID  Isolation for dialysis  bp stable  Monitor volume and uf with hd as able  Fu plan osu for neuro work up with hiv as confused   Will follow  dw girlfriend in room and not speak english and pt interpretor.  Will monitor and follow   Without proper outpt care there is concern for decline in health               Blaine Monique MD

## 2020-02-01 NOTE — PROGRESS NOTES
Patient had a witnessed fall whiles trying to get out of bed without assistance. Sat on his buttocks. Will stat bilateral wrist soft restraints. If patient increasingly violet, will transfer to ICU and stat a violent restraint. Male sitter at bedside. Patient assessed. He is in bed, no neuro change noted.

## 2020-02-01 NOTE — PROGRESS NOTES
Hospitalist Progress Note      Name:  Sunitha Patel /Age/Sex: 1975  (45 y.o. male)   MRN & CSN:  [id number] & [id number] Admission Date/Time: 1/15/2020  4:05 AM   Location:  -A PCP: No primary care provider on file. Hospital Day: 18    Assessment and Plan:   Sunitha Patel is a 45 y.o.  male  who presents with AIDS (acquired immune deficiency syndrome) ([facility] [address]     Acute hypoxic respiratory failure due to bilateral pneumonia and PE  HIV, new diagnosis  KRIS on CKD stage IV  Hypothermia  Hypertension    Plan  Status post bronchoscopy, CT with RML and RLL pulmonary embolism, repeat CT with improved pneumonia Bactrim for prophylaxis for PJP. Fungal culture pending. CMV pending. Continue HIV meds. Patient has HIV cardiomyopathy with ejection fraction 25 to 30% continue with dialysis as per nephrology. Continue to monitor vital signs temperature curve. Patient counseled    Diet Dietary Nutrition Supplements: Standard High Calorie Oral Supplement  DIET RENAL;   DVT Prophylaxis [] Lovenox, []  Heparin, [] SCDs, [] Ambulation   GI Prophylaxis [] PPI,  [] H2 Blocker,  [] Carafate,  [] Diet/Tube Feeds   Code Status Full Code               Objective: Intake/Output Summary (Last 24 hours) at 2020 1339  Last data filed at 2020 0910  Gross per 24 hour   Intake 1420 ml   Output 3825 ml   Net -2405 ml      Vitals:   Vitals:    20 1222   BP: 111/88   Pulse: 65   Resp: 22   Temp: 98 °F (36.7 °C)   SpO2:      Physical Exam:   GEN Awake male, sitting upright in bed in no apparent distress. Appears given age. EYES Pupils are equally round. No scleral erythema, discharge, or conjunctivitis. HENT Mucous membranes are moist. Oral pharynx without exudates, no evidence of thrush. NECK Supple, no apparent thyromegaly or masses. RESP Clear to auscultation, no wheezes, rales or rhonchi. Symmetric chest movement while on room air. CARDIO/VASC S1/S2 auscultated.  Regular rate without appreciable murmurs, rubs, or gallops. No JVD or carotid bruits. Peripheral pulses equal bilaterally and palpable. No peripheral edema. GI Abdomen is soft without significant tenderness, masses, or guarding. Bowel sounds are normoactive. Rectal exam deferred.  No costovertebral angle tenderness. Normal appearing external genitalia. Mcgarry catheter is not present. HEME/LYMPH No palpable cervical lymphadenopathy and no hepatosplenomegaly. No petechiae or ecchymoses. MSK No gross joint deformities. SKIN Normal coloration, warm, dry. NEURO Cranial nerves appear grossly intact, normal speech, no lateralizing weakness. PSYCH Awake, alert, oriented x 4. Affect appropriate.     Medications:   Medications:    sertraline  50 mg Oral Daily    lamiVUDine  50 mg Oral Daily    dolutegravir sodium  50 mg Oral Daily    predniSONE  40 mg Oral Daily    chlorproMAZINE  25 mg Oral Once    linezolid  600 mg Intravenous Q12H    metroNIDAZOLE  500 mg Intravenous Q8H    sulfamethoxazole-trimethoprim (BACTRIM) IVPB  5 mg/kg Intravenous Daily    apixaban  5 mg Oral BID    ceftolozane-tazobactam (ZERBAXA) IVPB  450 mg Intravenous Q8H    tenofovir  300 mg Oral Q7 Days    midodrine  10 mg Oral TID WC    pantoprazole  40 mg Intravenous Daily    0.9 % sodium chloride  250 mL Intravenous Once    LORazepam  0.5 mg Intravenous Once    metoprolol tartrate  50 mg Oral BID    calcium acetate  1,334 mg Oral TID WC    sevelamer  800 mg Oral TID WC    calcitRIOL  0.5 mcg Oral Daily    atorvastatin  40 mg Oral Nightly    sodium chloride flush  10 mL Intravenous 2 times per day      Infusions:   PRN Meds: acetaminophen, 650 mg, Q4H PRN  ipratropium-albuterol, 1 ampule, PRN  diphenhydrAMINE, 50 mg, Nightly PRN  hydrOXYzine, 25 mg, TID PRN  sodium chloride flush, 10 mL, PRN  calcium gluconate IVPB, 1 g, PRN    Or  calcium gluconate IVPB, 2 g, PRN    Or  calcium gluconate IVPB, 3 g, PRN    Or  calcium gluconate IVPB, 4 g, PRN  sodium chloride flush, 10 mL, PRN  magnesium hydroxide, 30 mL, Daily PRN  ondansetron, 4 mg, Q6H PRN  acetaminophen, 650 mg, Q6H PRN          Electronically signed by Alec Flores MD on 2/1/2020 at 1:39 PM
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
## 2020-02-02 LAB
ALBUMIN SERPL-MCNC: 2.5 GM/DL (ref 3.4–5)
ANION GAP SERPL CALCULATED.3IONS-SCNC: 14 MMOL/L (ref 4–16)
BASOPHILS ABSOLUTE: 0 K/CU MM
BASOPHILS RELATIVE PERCENT: 0.1 % (ref 0–1)
BUN BLDV-MCNC: 32 MG/DL (ref 6–23)
CALCIUM SERPL-MCNC: 8.1 MG/DL (ref 8.3–10.6)
CHLORIDE BLD-SCNC: 89 MMOL/L (ref 99–110)
CMV DNA QUANTATATIVE INTERPRETATION: ABNORMAL
CMV DNA QUANTATATIVE INTERPRETATION: DETECTED
CMV QUANT IU/ML: ABNORMAL IU/ML
CMV QUANT LOG IU/ML: 4.5 LOG IU/ML
CMV QUANT LOG IU/ML: 4.8 LOG CPY/ML
CMV QUANT LOG IU/ML: ABNORMAL LOG CPY/ML
CMVQ COPY/ML: ABNORMAL CPY/ML
CO2: 26 MMOL/L (ref 21–32)
CREAT SERPL-MCNC: 5.8 MG/DL (ref 0.9–1.3)
DIFFERENTIAL TYPE: ABNORMAL
EOSINOPHILS ABSOLUTE: 0 K/CU MM
EOSINOPHILS RELATIVE PERCENT: 0.3 % (ref 0–3)
GFR AFRICAN AMERICAN: 13 ML/MIN/1.73M2
GFR NON-AFRICAN AMERICAN: 11 ML/MIN/1.73M2
GLUCOSE BLD-MCNC: 95 MG/DL (ref 70–99)
HCT VFR BLD CALC: 26.4 % (ref 42–52)
HEMOGLOBIN: 7.9 GM/DL (ref 13.5–18)
HIGH SENSITIVE C-REACTIVE PROTEIN: 16.9 MG/L
IMMATURE NEUTROPHIL %: 0.9 % (ref 0–0.43)
LYMPHOCYTES ABSOLUTE: 0.5 K/CU MM
LYMPHOCYTES RELATIVE PERCENT: 6.6 % (ref 24–44)
Lab: NORMAL
Lab: NORMAL
MCH RBC QN AUTO: 25.1 PG (ref 27–31)
MCHC RBC AUTO-ENTMCNC: 29.9 % (ref 32–36)
MCV RBC AUTO: 83.8 FL (ref 78–100)
MONOCYTES ABSOLUTE: 0.1 K/CU MM
MONOCYTES RELATIVE PERCENT: 1.7 % (ref 0–4)
NUCLEATED RBC %: 0 %
PDW BLD-RTO: 22.3 % (ref 11.7–14.9)
PHOSPHORUS: 4 MG/DL (ref 2.5–4.9)
PLATELET # BLD: 183 K/CU MM (ref 140–440)
PMV BLD AUTO: 11.8 FL (ref 7.5–11.1)
POTASSIUM SERPL-SCNC: 3.5 MMOL/L (ref 3.5–5.1)
PROCALCITONIN: 8.89
RBC # BLD: 3.15 M/CU MM (ref 4.6–6.2)
SEGMENTED NEUTROPHILS ABSOLUTE COUNT: 6.2 K/CU MM
SEGMENTED NEUTROPHILS RELATIVE PERCENT: 90.4 % (ref 36–66)
SODIUM BLD-SCNC: 129 MMOL/L (ref 135–145)
TEST NAME: NORMAL
TEST NAME: NORMAL
TOTAL IMMATURE NEUTOROPHIL: 0.06 K/CU MM
TOTAL NUCLEATED RBC: 0 K/CU MM
WBC # BLD: 6.9 K/CU MM (ref 4–10.5)

## 2020-02-02 PROCEDURE — 6360000002 HC RX W HCPCS: Performed by: INTERNAL MEDICINE

## 2020-02-02 PROCEDURE — 6370000000 HC RX 637 (ALT 250 FOR IP): Performed by: HOSPITALIST

## 2020-02-02 PROCEDURE — 6370000000 HC RX 637 (ALT 250 FOR IP): Performed by: INTERNAL MEDICINE

## 2020-02-02 PROCEDURE — 2580000003 HC RX 258: Performed by: INTERNAL MEDICINE

## 2020-02-02 PROCEDURE — 1200000000 HC SEMI PRIVATE

## 2020-02-02 PROCEDURE — 80069 RENAL FUNCTION PANEL: CPT

## 2020-02-02 PROCEDURE — 2580000003 HC RX 258: Performed by: STUDENT IN AN ORGANIZED HEALTH CARE EDUCATION/TRAINING PROGRAM

## 2020-02-02 PROCEDURE — 6370000000 HC RX 637 (ALT 250 FOR IP): Performed by: NURSE PRACTITIONER

## 2020-02-02 PROCEDURE — 2500000003 HC RX 250 WO HCPCS: Performed by: INTERNAL MEDICINE

## 2020-02-02 PROCEDURE — 86141 C-REACTIVE PROTEIN HS: CPT

## 2020-02-02 PROCEDURE — 84145 PROCALCITONIN (PCT): CPT

## 2020-02-02 PROCEDURE — 80053 COMPREHEN METABOLIC PANEL: CPT

## 2020-02-02 PROCEDURE — 94761 N-INVAS EAR/PLS OXIMETRY MLT: CPT

## 2020-02-02 PROCEDURE — C9113 INJ PANTOPRAZOLE SODIUM, VIA: HCPCS | Performed by: INTERNAL MEDICINE

## 2020-02-02 PROCEDURE — 85025 COMPLETE CBC W/AUTO DIFF WBC: CPT

## 2020-02-02 RX ORDER — SODIUM CHLORIDE 9 MG/ML
INJECTION, SOLUTION INTRAVENOUS CONTINUOUS
Status: DISCONTINUED | OUTPATIENT
Start: 2020-02-02 | End: 2020-02-04

## 2020-02-02 RX ADMIN — SEVELAMER CARBONATE 800 MG: 800 TABLET, FILM COATED ORAL at 12:23

## 2020-02-02 RX ADMIN — MIDODRINE HYDROCHLORIDE 10 MG: 5 TABLET ORAL at 16:50

## 2020-02-02 RX ADMIN — SEVELAMER CARBONATE 800 MG: 800 TABLET, FILM COATED ORAL at 08:25

## 2020-02-02 RX ADMIN — METRONIDAZOLE 500 MG: 500 INJECTION, SOLUTION INTRAVENOUS at 03:31

## 2020-02-02 RX ADMIN — APIXABAN 5 MG: 5 TABLET, FILM COATED ORAL at 21:06

## 2020-02-02 RX ADMIN — DOLUTEGRAVIR SODIUM 50 MG: 50 TABLET, FILM COATED ORAL at 21:06

## 2020-02-02 RX ADMIN — PREDNISONE 40 MG: 10 TABLET ORAL at 08:24

## 2020-02-02 RX ADMIN — ATORVASTATIN CALCIUM 40 MG: 40 TABLET, FILM COATED ORAL at 21:06

## 2020-02-02 RX ADMIN — PANTOPRAZOLE SODIUM 40 MG: 40 INJECTION, POWDER, FOR SOLUTION INTRAVENOUS at 08:24

## 2020-02-02 RX ADMIN — METRONIDAZOLE 500 MG: 500 INJECTION, SOLUTION INTRAVENOUS at 11:09

## 2020-02-02 RX ADMIN — SERTRALINE HYDROCHLORIDE 50 MG: 50 TABLET ORAL at 08:25

## 2020-02-02 RX ADMIN — LINEZOLID 600 MG: 600 INJECTION, SOLUTION INTRAVENOUS at 12:23

## 2020-02-02 RX ADMIN — METOPROLOL TARTRATE 50 MG: 50 TABLET, FILM COATED ORAL at 21:06

## 2020-02-02 RX ADMIN — METOPROLOL TARTRATE 50 MG: 50 TABLET, FILM COATED ORAL at 08:25

## 2020-02-02 RX ADMIN — SODIUM CHLORIDE, PRESERVATIVE FREE 10 ML: 5 INJECTION INTRAVENOUS at 16:28

## 2020-02-02 RX ADMIN — METRONIDAZOLE 500 MG: 500 INJECTION, SOLUTION INTRAVENOUS at 21:06

## 2020-02-02 RX ADMIN — SULFAMETHOXAZOLE AND TRIMETHOPRIM 328 MG: 80; 16 INJECTION, SOLUTION, CONCENTRATE INTRAVENOUS at 09:34

## 2020-02-02 RX ADMIN — CALCIUM ACETATE 1334 MG: 667 CAPSULE ORAL at 16:50

## 2020-02-02 RX ADMIN — APIXABAN 5 MG: 5 TABLET, FILM COATED ORAL at 08:25

## 2020-02-02 RX ADMIN — CALCIUM ACETATE 1334 MG: 667 CAPSULE ORAL at 08:25

## 2020-02-02 RX ADMIN — SEVELAMER CARBONATE 800 MG: 800 TABLET, FILM COATED ORAL at 16:50

## 2020-02-02 RX ADMIN — CEFTOLOZANE AND TAZOBACTAM 450 MG: 1; .5 INJECTION, POWDER, LYOPHILIZED, FOR SOLUTION INTRAVENOUS at 18:17

## 2020-02-02 RX ADMIN — CALCIUM ACETATE 1334 MG: 667 CAPSULE ORAL at 12:22

## 2020-02-02 RX ADMIN — SODIUM CHLORIDE: 9 INJECTION, SOLUTION INTRAVENOUS at 16:28

## 2020-02-02 RX ADMIN — SODIUM CHLORIDE, PRESERVATIVE FREE 10 ML: 5 INJECTION INTRAVENOUS at 03:31

## 2020-02-02 RX ADMIN — CALCITRIOL CAPSULES 0.25 MCG 0.5 MCG: 0.25 CAPSULE ORAL at 08:25

## 2020-02-02 RX ADMIN — CEFTOLOZANE AND TAZOBACTAM 450 MG: 1; .5 INJECTION, POWDER, LYOPHILIZED, FOR SOLUTION INTRAVENOUS at 08:24

## 2020-02-02 RX ADMIN — LAMIVUDINE 50 MG: 10 SOLUTION ORAL at 21:06

## 2020-02-02 ASSESSMENT — PAIN SCALES - GENERAL
PAINLEVEL_OUTOF10: 0

## 2020-02-02 NOTE — PROGRESS NOTES
Physical Exam:   GEN Awake male, sitting upright in bed in no apparent distress. Appears given age. EYES Pupils are equally round. No scleral erythema, discharge, or conjunctivitis. HENT Mucous membranes are moist. Oral pharynx without exudates, no evidence of thrush. NECK Supple, no apparent thyromegaly or masses. RESP Clear to auscultation, no wheezes, rales or rhonchi. Symmetric chest movement while on room air. CARDIO/VASC S1/S2 auscultated. Regular rate without appreciable murmurs, rubs, or gallops. No JVD or carotid bruits. Peripheral pulses equal bilaterally and palpable. No peripheral edema. GI Abdomen is soft without significant tenderness, masses, or guarding. Bowel sounds are normoactive. Rectal exam deferred.  No costovertebral angle tenderness. Normal appearing external genitalia. Mcgarry catheter is not present. HEME/LYMPH No palpable cervical lymphadenopathy and no hepatosplenomegaly. No petechiae or ecchymoses. MSK No gross joint deformities. SKIN Normal coloration, warm, dry. NEURO Cranial nerves appear grossly intact, normal speech, no lateralizing weakness. PSYCH Awake, alert, oriented x 4. Affect appropriate.     Medications:   Medications:    sertraline  50 mg Oral Daily    lamiVUDine  50 mg Oral Daily    dolutegravir sodium  50 mg Oral Daily    predniSONE  40 mg Oral Daily    chlorproMAZINE  25 mg Oral Once    linezolid  600 mg Intravenous Q12H    metroNIDAZOLE  500 mg Intravenous Q8H    sulfamethoxazole-trimethoprim (BACTRIM) IVPB  5 mg/kg Intravenous Daily    apixaban  5 mg Oral BID    ceftolozane-tazobactam (ZERBAXA) IVPB  450 mg Intravenous Q8H    tenofovir  300 mg Oral Q7 Days    midodrine  10 mg Oral TID     pantoprazole  40 mg Intravenous Daily    0.9 % sodium chloride  250 mL Intravenous Once    LORazepam  0.5 mg Intravenous Once    metoprolol tartrate  50 mg Oral BID    calcium acetate  1,334 mg Oral TID     sevelamer  800 mg Oral TID     calcitRIOL  0.5 mcg Oral Daily    atorvastatin  40 mg Oral Nightly    sodium chloride flush  10 mL Intravenous 2 times per day      Infusions:    sodium chloride       PRN Meds: acetaminophen, 650 mg, Q4H PRN  ipratropium-albuterol, 1 ampule, PRN  diphenhydrAMINE, 50 mg, Nightly PRN  hydrOXYzine, 25 mg, TID PRN  sodium chloride flush, 10 mL, PRN  calcium gluconate IVPB, 1 g, PRN    Or  calcium gluconate IVPB, 2 g, PRN    Or  calcium gluconate IVPB, 3 g, PRN    Or  calcium gluconate IVPB, 4 g, PRN  sodium chloride flush, 10 mL, PRN  magnesium hydroxide, 30 mL, Daily PRN  ondansetron, 4 mg, Q6H PRN  acetaminophen, 650 mg, Q6H PRN          Electronically signed by Troy Johnson MD on 2/2/2020 at 1:36 PM

## 2020-02-02 NOTE — PROGRESS NOTES
Nephrology Progress Note  2/2/2020 8:03 AM  Subjective:   Admit Date: 1/15/2020  PCP: No primary care provider on file. Interval History:cousin Dinorah Casey [phone]. Pt today more lucid and calm. Has sitter and overall doing better. Diet: Dietary Nutrition Supplements: Standard High Calorie Oral Supplement  DIET RENAL; Safety Tray; Safety Tray (Disposables)  Pain is: Moderate      Data:   Scheduled Meds:   sertraline  50 mg Oral Daily    lamiVUDine  50 mg Oral Daily    dolutegravir sodium  50 mg Oral Daily    predniSONE  40 mg Oral Daily    chlorproMAZINE  25 mg Oral Once    linezolid  600 mg Intravenous Q12H    metroNIDAZOLE  500 mg Intravenous Q8H    sulfamethoxazole-trimethoprim (BACTRIM) IVPB  5 mg/kg Intravenous Daily    apixaban  5 mg Oral BID    ceftolozane-tazobactam (ZERBAXA) IVPB  450 mg Intravenous Q8H    tenofovir  300 mg Oral Q7 Days    midodrine  10 mg Oral TID WC    pantoprazole  40 mg Intravenous Daily    0.9 % sodium chloride  250 mL Intravenous Once    LORazepam  0.5 mg Intravenous Once    metoprolol tartrate  50 mg Oral BID    calcium acetate  1,334 mg Oral TID WC    sevelamer  800 mg Oral TID WC    calcitRIOL  0.5 mcg Oral Daily    atorvastatin  40 mg Oral Nightly    sodium chloride flush  10 mL Intravenous 2 times per day     Continuous Infusions:    PRN Meds:acetaminophen, ipratropium-albuterol, diphenhydrAMINE, hydrOXYzine, sodium chloride flush, calcium gluconate IVPB **OR** calcium gluconate IVPB **OR** calcium gluconate IVPB **OR** calcium gluconate IVPB, sodium chloride flush, magnesium hydroxide, ondansetron, acetaminophen  I/O last 3 completed shifts: In: 1480 [P.O.:280; IV Piggyback:1200]  Out: 225 [Urine:225]  No intake/output data recorded.     Intake/Output Summary (Last 24 hours) at 2/2/2020 0803  Last data filed at 2/2/2020 0350  Gross per 24 hour   Intake 1480 ml   Output 100 ml   Net 1380 ml     CBC:   Recent Labs     01/31/20  0710 02/01/20  7466 02/02/20  0640   WBC 3.3* 6.5 6.9   HGB 7.5* 7.8* 7.9*   * 178 183     BMP:    Recent Labs     01/31/20  0710 02/01/20  0620 02/02/20  0640   * 132* 129*   K 4.1 3.7 3.5   CL 87* 90* 89*   CO2 25 28 26   BUN 41* 22 32*   CREATININE 6.9* 4.8* 5.8*   GLUCOSE 127* 107* 95     Hepatic:   Recent Labs     01/31/20  0710   AST 20   ALT 14   BILITOT 0.1   ALKPHOS 58     Troponin: No results for input(s): TROPONINI in the last 72 hours. BNP: No results for input(s): BNP in the last 72 hours. Lipids: No results for input(s): CHOL, HDL in the last 72 hours. Invalid input(s): LDLCALCU  ABGs:   Lab Results   Component Value Date    PO2ART 77 01/19/2020    [id number] 39.0 01/19/2020     INR:   No results for input(s): INR in the last 72 hours.   Renal Labs  Albumin:    Lab Results   Component Value Date    LABALBU 2.5 02/02/2020    LABALBU 40 01/15/2020     Calcium:    Lab Results   Component Value Date    CALCIUM 8.1 02/02/2020     Phosphorus:    Lab Results   Component Value Date    PHOS 4.0 02/02/2020     U/A:    Lab Results   Component Value Date    NITRU NEGATIVE 01/15/2020    COLORU YELLOW 01/15/2020    WBCUA 2 01/15/2020    RBCUA 1 01/15/2020    MUCUS RARE 01/15/2020    TRICHOMONAS NONE SEEN 01/15/2020    BACTERIA NEGATIVE 01/15/2020    CLARITYU CLEAR 01/15/2020    SPECGRAV 1.024 01/15/2020    UROBILINOGEN NORMAL 01/15/2020    BILIRUBINUR NEGATIVE 01/15/2020    BLOODU NEGATIVE 01/15/2020    KETUA SMALL 01/15/2020     ABG:    Lab Results   Component Value Date    IAF8COK 39.0 01/19/2020    PO2ART 77 01/19/2020    KPH1DBX 26.5 01/19/2020     HgBA1c:    Lab Results   Component Value Date    LABA1C 6.1 01/15/2020     Microalbumen/Creatinine ratio:  No components found for: RUCREAT          Objective:   Vitals: /77   Pulse 74   Temp 97.7 °F (36.5 °C) (Oral)   Resp 17   Ht 5' 2\" (1.575 m)   Wt 141 lb 15.6 oz (64.4 kg)   SpO2 96%   BMI 25.97 kg/m²   General appearance: awake with sitter  HEENT: Head: Normal, normocephalic, atraumatic. Neck: supple, symmetrical, trachea midline  Lungs: diminished breath sounds bilaterally  Heart: S1, S2 normal  Abdomen: abnormal findings:  soft nt  Extremities: edema trace tunnel hd catheter  Neurologic: Mental status: alertness: awake        Patient Active Problem List:     Acute renal failure (HCC)    Assessment and Plan:      IMP:  esrd from hivan  Hyperkalemia/hyponatremia  confusion  PE with sob  Anemia  hiv +  Hep B prior infection  hypotension  chf ef 25-30%    Plan     Doing hd mwf and monitor  Correct na and K with dialysis  Affect improving and mri no acute pathology  On eliquis  Hb low stable with epo  Hiv therapy with ID  Isolation for HD  bp low stable  REC CARDIO INPUT WITH LOW EF IN ABOVE SETTING BEFORE DC  Overall medically improved,   Appreciate psych eval and felt depression without psychosis and started zoloft    Pt getting close for outpt care, monitor ambulation and start planning for dc. From renal cannot provide outside dialysis as need at least medicaid pending status to start process and he does not have that. rec try get to [facility] to help with meds and needs. Will need continue come here as need to [facility] for dialysis when not feel well.  rec try get to best place with family support and that may be with cousin [place] as locally girlfrend cannot drive and he has no job or source income and will need assistance to get better                 Kristin Smith MD

## 2020-02-02 NOTE — PROGRESS NOTES
Pt in bed resting, sitter at bedside, educated pt on safety and the importance of personal boundaries, keeping hands to self, pt verbalized understanding, and apologized, stated \"i'm sorry for what I did last night. \"  Pt personal alarm on and intact, will continue to monitor.

## 2020-02-03 LAB
ALBUMIN SERPL-MCNC: 2.3 GM/DL (ref 3.4–5)
ANION GAP SERPL CALCULATED.3IONS-SCNC: 14 MMOL/L (ref 4–16)
BASOPHILS ABSOLUTE: 0 K/CU MM
BASOPHILS RELATIVE PERCENT: 0 % (ref 0–1)
BUN BLDV-MCNC: 43 MG/DL (ref 6–23)
CALCIUM SERPL-MCNC: 8.4 MG/DL (ref 8.3–10.6)
CHLORIDE BLD-SCNC: 91 MMOL/L (ref 99–110)
CO2: 24 MMOL/L (ref 21–32)
CREAT SERPL-MCNC: 6.5 MG/DL (ref 0.9–1.3)
CULTURE: NORMAL
CULTURE: NORMAL
DIFFERENTIAL TYPE: ABNORMAL
EOSINOPHILS ABSOLUTE: 0 K/CU MM
EOSINOPHILS RELATIVE PERCENT: 0 % (ref 0–3)
GFR AFRICAN AMERICAN: 11 ML/MIN/1.73M2
GFR NON-AFRICAN AMERICAN: 9 ML/MIN/1.73M2
GLUCOSE BLD-MCNC: 92 MG/DL (ref 70–99)
HCT VFR BLD CALC: 25.7 % (ref 42–52)
HEMOGLOBIN: 7.8 GM/DL (ref 13.5–18)
HIGH SENSITIVE C-REACTIVE PROTEIN: 10.6 MG/L
IMMATURE NEUTROPHIL %: 0.6 % (ref 0–0.43)
LYMPHOCYTES ABSOLUTE: 0.3 K/CU MM
LYMPHOCYTES RELATIVE PERCENT: 6.3 % (ref 24–44)
Lab: NORMAL
Lab: NORMAL
MCH RBC QN AUTO: 25.3 PG (ref 27–31)
MCHC RBC AUTO-ENTMCNC: 30.4 % (ref 32–36)
MCV RBC AUTO: 83.4 FL (ref 78–100)
MONOCYTES ABSOLUTE: 0.1 K/CU MM
MONOCYTES RELATIVE PERCENT: 2.1 % (ref 0–4)
NUCLEATED RBC %: 0 %
PDW BLD-RTO: 22.5 % (ref 11.7–14.9)
PHOSPHORUS: 4.4 MG/DL (ref 2.5–4.9)
PLATELET # BLD: 176 K/CU MM (ref 140–440)
PMV BLD AUTO: 11.7 FL (ref 7.5–11.1)
POTASSIUM SERPL-SCNC: 3.9 MMOL/L (ref 3.5–5.1)
RBC # BLD: 3.08 M/CU MM (ref 4.6–6.2)
SEGMENTED NEUTROPHILS ABSOLUTE COUNT: 4.8 K/CU MM
SEGMENTED NEUTROPHILS RELATIVE PERCENT: 91 % (ref 36–66)
SODIUM BLD-SCNC: 129 MMOL/L (ref 135–145)
SPECIMEN: NORMAL
SPECIMEN: NORMAL
TOTAL IMMATURE NEUTOROPHIL: 0.03 K/CU MM
TOTAL NUCLEATED RBC: 0 K/CU MM
TOTAL RETICULOCYTE COUNT: 0.06 K/CU MM
WBC # BLD: 5.3 K/CU MM (ref 4–10.5)

## 2020-02-03 PROCEDURE — 6360000002 HC RX W HCPCS: Performed by: INTERNAL MEDICINE

## 2020-02-03 PROCEDURE — 94761 N-INVAS EAR/PLS OXIMETRY MLT: CPT

## 2020-02-03 PROCEDURE — 6370000000 HC RX 637 (ALT 250 FOR IP): Performed by: INTERNAL MEDICINE

## 2020-02-03 PROCEDURE — 1200000000 HC SEMI PRIVATE

## 2020-02-03 PROCEDURE — 6360000002 HC RX W HCPCS: Performed by: NURSE PRACTITIONER

## 2020-02-03 PROCEDURE — 2580000003 HC RX 258: Performed by: INTERNAL MEDICINE

## 2020-02-03 PROCEDURE — 2580000003 HC RX 258: Performed by: STUDENT IN AN ORGANIZED HEALTH CARE EDUCATION/TRAINING PROGRAM

## 2020-02-03 PROCEDURE — 86141 C-REACTIVE PROTEIN HS: CPT

## 2020-02-03 PROCEDURE — 2500000003 HC RX 250 WO HCPCS: Performed by: INTERNAL MEDICINE

## 2020-02-03 PROCEDURE — 94640 AIRWAY INHALATION TREATMENT: CPT

## 2020-02-03 PROCEDURE — 6370000000 HC RX 637 (ALT 250 FOR IP): Performed by: HOSPITALIST

## 2020-02-03 PROCEDURE — C9113 INJ PANTOPRAZOLE SODIUM, VIA: HCPCS | Performed by: INTERNAL MEDICINE

## 2020-02-03 PROCEDURE — 85025 COMPLETE CBC W/AUTO DIFF WBC: CPT

## 2020-02-03 PROCEDURE — 6370000000 HC RX 637 (ALT 250 FOR IP): Performed by: NURSE PRACTITIONER

## 2020-02-03 PROCEDURE — 80069 RENAL FUNCTION PANEL: CPT

## 2020-02-03 PROCEDURE — 90935 HEMODIALYSIS ONE EVALUATION: CPT

## 2020-02-03 RX ORDER — METOCLOPRAMIDE HYDROCHLORIDE 5 MG/ML
10 INJECTION INTRAMUSCULAR; INTRAVENOUS ONCE
Status: COMPLETED | OUTPATIENT
Start: 2020-02-03 | End: 2020-02-03

## 2020-02-03 RX ORDER — HEPARIN SODIUM 1000 [USP'U]/ML
3800 INJECTION, SOLUTION INTRAVENOUS; SUBCUTANEOUS
Status: COMPLETED | OUTPATIENT
Start: 2020-02-03 | End: 2020-02-03

## 2020-02-03 RX ADMIN — METRONIDAZOLE 500 MG: 500 INJECTION, SOLUTION INTRAVENOUS at 02:41

## 2020-02-03 RX ADMIN — CALCIUM ACETATE 1334 MG: 667 CAPSULE ORAL at 20:15

## 2020-02-03 RX ADMIN — APIXABAN 5 MG: 5 TABLET, FILM COATED ORAL at 22:03

## 2020-02-03 RX ADMIN — SEVELAMER CARBONATE 800 MG: 800 TABLET, FILM COATED ORAL at 09:56

## 2020-02-03 RX ADMIN — SODIUM CHLORIDE, PRESERVATIVE FREE 10 ML: 5 INJECTION INTRAVENOUS at 22:04

## 2020-02-03 RX ADMIN — CEFTOLOZANE AND TAZOBACTAM 450 MG: 1; .5 INJECTION, POWDER, LYOPHILIZED, FOR SOLUTION INTRAVENOUS at 09:50

## 2020-02-03 RX ADMIN — TENOFOVIR DISOPROXIL FUMARATE 300 MG: 300 TABLET, COATED ORAL at 20:16

## 2020-02-03 RX ADMIN — LINEZOLID 600 MG: 600 INJECTION, SOLUTION INTRAVENOUS at 16:21

## 2020-02-03 RX ADMIN — LAMIVUDINE 50 MG: 10 SOLUTION ORAL at 22:03

## 2020-02-03 RX ADMIN — LINEZOLID 600 MG: 600 INJECTION, SOLUTION INTRAVENOUS at 01:26

## 2020-02-03 RX ADMIN — PREDNISONE 40 MG: 10 TABLET ORAL at 09:56

## 2020-02-03 RX ADMIN — IRON SUCROSE 50 MG: 20 INJECTION, SOLUTION INTRAVENOUS at 12:14

## 2020-02-03 RX ADMIN — CEFTOLOZANE AND TAZOBACTAM 450 MG: 1; .5 INJECTION, POWDER, LYOPHILIZED, FOR SOLUTION INTRAVENOUS at 01:26

## 2020-02-03 RX ADMIN — CALCITRIOL CAPSULES 0.25 MCG 0.5 MCG: 0.25 CAPSULE ORAL at 09:55

## 2020-02-03 RX ADMIN — MIDODRINE HYDROCHLORIDE 10 MG: 5 TABLET ORAL at 20:15

## 2020-02-03 RX ADMIN — MIDODRINE HYDROCHLORIDE 10 MG: 5 TABLET ORAL at 09:57

## 2020-02-03 RX ADMIN — METRONIDAZOLE 500 MG: 500 INJECTION, SOLUTION INTRAVENOUS at 20:16

## 2020-02-03 RX ADMIN — EPOETIN ALFA-EPBX 10000 UNITS: 10000 INJECTION, SOLUTION INTRAVENOUS; SUBCUTANEOUS at 12:13

## 2020-02-03 RX ADMIN — METOPROLOL TARTRATE 50 MG: 50 TABLET, FILM COATED ORAL at 22:03

## 2020-02-03 RX ADMIN — ATORVASTATIN CALCIUM 40 MG: 40 TABLET, FILM COATED ORAL at 22:03

## 2020-02-03 RX ADMIN — DOLUTEGRAVIR SODIUM 50 MG: 50 TABLET, FILM COATED ORAL at 22:03

## 2020-02-03 RX ADMIN — SODIUM CHLORIDE: 9 INJECTION, SOLUTION INTRAVENOUS at 23:10

## 2020-02-03 RX ADMIN — METOCLOPRAMIDE 10 MG: 5 INJECTION, SOLUTION INTRAMUSCULAR; INTRAVENOUS at 22:03

## 2020-02-03 RX ADMIN — CALCIUM ACETATE 1334 MG: 667 CAPSULE ORAL at 09:56

## 2020-02-03 RX ADMIN — HEPARIN SODIUM 3800 UNITS: 1000 INJECTION, SOLUTION INTRAVENOUS; SUBCUTANEOUS at 13:10

## 2020-02-03 RX ADMIN — SEVELAMER CARBONATE 800 MG: 800 TABLET, FILM COATED ORAL at 20:16

## 2020-02-03 RX ADMIN — PANTOPRAZOLE SODIUM 40 MG: 40 INJECTION, POWDER, FOR SOLUTION INTRAVENOUS at 10:00

## 2020-02-03 RX ADMIN — SERTRALINE HYDROCHLORIDE 50 MG: 50 TABLET ORAL at 09:58

## 2020-02-03 RX ADMIN — IPRATROPIUM BROMIDE AND ALBUTEROL SULFATE 1 AMPULE: .5; 3 SOLUTION RESPIRATORY (INHALATION) at 02:49

## 2020-02-03 RX ADMIN — APIXABAN 5 MG: 5 TABLET, FILM COATED ORAL at 09:56

## 2020-02-03 ASSESSMENT — PAIN SCALES - GENERAL
PAINLEVEL_OUTOF10: 0

## 2020-02-03 NOTE — PLAN OF CARE
Problem: Fluid Volume:  Goal: Hemodynamic stability will improve  Description  Hemodynamic stability will improve  2/3/2020 0314 by Roma Mauro RN  Outcome: Ongoing  2/2/2020 1823 by Agatha Mc. RADHA Durant  Outcome: Ongoing  Goal: Ability to maintain a balanced intake and output will improve  Description  Ability to maintain a balanced intake and output will improve  2/3/2020 0314 by Roma Mauro RN  Outcome: Ongoing  2/2/2020 1823 by Agatha Mc. RADHA Durant  Outcome: Ongoing  Goal: Ability to achieve a balanced intake and output will improve  Description  Ability to achieve a balanced intake and output will improve  2/3/2020 0314 by Roma Mauro RN  Outcome: Ongoing  2/2/2020 1823 by Agatha Mc.  RADHA Durant  Outcome: Ongoing

## 2020-02-03 NOTE — PROGRESS NOTES
atorvastatin  40 mg Oral Nightly    sodium chloride flush  10 mL Intravenous 2 times per day      Infusions:    sodium chloride 50 mL/hr at 02/02/20 1628     PRN Meds: acetaminophen, 650 mg, Q4H PRN  ipratropium-albuterol, 1 ampule, PRN  diphenhydrAMINE, 50 mg, Nightly PRN  hydrOXYzine, 25 mg, TID PRN  sodium chloride flush, 10 mL, PRN  calcium gluconate IVPB, 1 g, PRN    Or  calcium gluconate IVPB, 2 g, PRN    Or  calcium gluconate IVPB, 3 g, PRN    Or  calcium gluconate IVPB, 4 g, PRN  sodium chloride flush, 10 mL, PRN  magnesium hydroxide, 30 mL, Daily PRN  ondansetron, 4 mg, Q6H PRN  acetaminophen, 650 mg, Q6H PRN          Electronically signed by Tank Crabtree MD on 2/3/2020 at 3:45 PM

## 2020-02-03 NOTE — PROGRESS NOTES
Nephrology Progress Note  2/3/2020 11:17 AM  Subjective:   Admit Date: 1/15/2020  PCP: No primary care provider on file. Interval History:cousin Shakira Christensen [phone]. Pt seen and examined on dialysis and tolerating. Hiccup and uf volume    Diet: Dietary Nutrition Supplements: Standard High Calorie Oral Supplement  DIET RENAL; Safety Tray; Safety Tray (Disposables)  Pain is: Moderate      Data:   Scheduled Meds:   epoetin david-epbx  10,000 Units Intravenous Once in dialysis    iron sucrose  50 mg Intravenous Once in dialysis    heparin (porcine)  3,800 Units Intercatheter Once in dialysis    sertraline  50 mg Oral Daily    lamiVUDine  50 mg Oral Daily    dolutegravir sodium  50 mg Oral Daily    chlorproMAZINE  25 mg Oral Once    linezolid  600 mg Intravenous Q12H    metroNIDAZOLE  500 mg Intravenous Q8H    sulfamethoxazole-trimethoprim (BACTRIM) IVPB  5 mg/kg Intravenous Daily    apixaban  5 mg Oral BID    ceftolozane-tazobactam (ZERBAXA) IVPB  450 mg Intravenous Q8H    tenofovir  300 mg Oral Q7 Days    midodrine  10 mg Oral TID WC    pantoprazole  40 mg Intravenous Daily    0.9 % sodium chloride  250 mL Intravenous Once    LORazepam  0.5 mg Intravenous Once    metoprolol tartrate  50 mg Oral BID    calcium acetate  1,334 mg Oral TID WC    sevelamer  800 mg Oral TID WC    calcitRIOL  0.5 mcg Oral Daily    atorvastatin  40 mg Oral Nightly    sodium chloride flush  10 mL Intravenous 2 times per day     Continuous Infusions:   sodium chloride 50 mL/hr at 02/02/20 1628     PRN Meds:acetaminophen, ipratropium-albuterol, diphenhydrAMINE, hydrOXYzine, sodium chloride flush, calcium gluconate IVPB **OR** calcium gluconate IVPB **OR** calcium gluconate IVPB **OR** calcium gluconate IVPB, sodium chloride flush, magnesium hydroxide, ondansetron, acetaminophen  No intake/output data recorded. No intake/output data recorded.   No intake or output data in the 24 hours ending 02/03/20 1117  CBC:   Recent Labs     02/01/20  0620 02/02/20  0640 02/03/20  0600   WBC 6.5 6.9 5.3   HGB 7.8* 7.9* 7.8*    183 176     BMP:    Recent Labs     02/01/20  0620 02/02/20  0640 02/03/20  0600   * 129* 129*   K 3.7 3.5 3.9   CL 90* 89* 91*   CO2 28 26 24   BUN 22 32* 43*   CREATININE 4.8* 5.8* 6.5*   GLUCOSE 107* 95 92     Hepatic:   No results for input(s): AST, ALT, ALB, BILITOT, ALKPHOS in the last 72 hours. Troponin: No results for input(s): TROPONINI in the last 72 hours. BNP: No results for input(s): BNP in the last 72 hours. Lipids: No results for input(s): CHOL, HDL in the last 72 hours. Invalid input(s): LDLCALCU  ABGs:   Lab Results   Component Value Date    PO2ART 77 01/19/2020    BWB5WAD 39.0 01/19/2020     INR:   No results for input(s): INR in the last 72 hours.   Renal Labs  Albumin:    Lab Results   Component Value Date    LABALBU 2.3 02/03/2020    LABALBU 40 01/15/2020     Calcium:    Lab Results   Component Value Date    CALCIUM 8.4 02/03/2020     Phosphorus:    Lab Results   Component Value Date    PHOS 4.4 02/03/2020     U/A:    Lab Results   Component Value Date    NITRU NEGATIVE 01/15/2020    COLORU YELLOW 01/15/2020    WBCUA 2 01/15/2020    RBCUA 1 01/15/2020    MUCUS RARE 01/15/2020    TRICHOMONAS NONE SEEN 01/15/2020    BACTERIA NEGATIVE 01/15/2020    CLARITYU CLEAR 01/15/2020    SPECGRAV 1.024 01/15/2020    UROBILINOGEN NORMAL 01/15/2020    BILIRUBINUR NEGATIVE 01/15/2020    BLOODU NEGATIVE 01/15/2020    KETUA SMALL 01/15/2020     ABG:    Lab Results   Component Value Date    GTZ6AJK 39.0 01/19/2020    PO2ART 77 01/19/2020    AFD4XPF 26.5 01/19/2020     HgBA1c:    Lab Results   Component Value Date    LABA1C 6.1 01/15/2020     Microalbumen/Creatinine ratio:  No components found for: RUCREAT          Objective:   Vitals: BP (!) 131/90   Pulse 58   Temp 98 °F (36.7 °C)   Resp 18   Ht 5' 2\" (1.575 m)   Wt 161 lb 2.5 oz (73.1 kg)   SpO2 94%   BMI 29.48 kg/m²   General appearance: awake  HEENT: Head: Normal, normocephalic, atraumatic. Neck: supple, symmetrical, trachea midline  Lungs: diminished breath sounds bilaterally  Heart: S1, S2 normal  Abdomen: abnormal findings:  soft nt  Extremities: edema trace tunnel hd catheter  Neurologic: Mental status: alertness: awake        Patient Active Problem List:     Acute renal failure (HCC)    Assessment and Plan:      IMP:  esrd from hivan  Hyperkalemia/hyponatremia  confusion  PE with sob  Anemia  hiv +  Hep B prior infection  hypotension  chf ef 25-30%    Plan     Doing hd today and maintain mwf for now  Use hd catheter  k and na correct with hd  Affect better  On eliquis  Monitor hb on epo  hiv meds per ID  tx isolation  bp stable  Cardiac monitor  Will follow and from renal can dc and need close fu and will need return to hospital when ill to need dialysis as cannot schedule outpt dialysis until get insurance status and will try do what I can,.                   Xiang Jimenez MD

## 2020-02-04 LAB
ALBUMIN SERPL-MCNC: 2.5 GM/DL (ref 3.4–5)
ANION GAP SERPL CALCULATED.3IONS-SCNC: 13 MMOL/L (ref 4–16)
BASOPHILS ABSOLUTE: 0 K/CU MM
BASOPHILS RELATIVE PERCENT: 0 % (ref 0–1)
BUN BLDV-MCNC: 31 MG/DL (ref 6–23)
CALCIUM SERPL-MCNC: 8.2 MG/DL (ref 8.3–10.6)
CHLORIDE BLD-SCNC: 94 MMOL/L (ref 99–110)
CO2: 26 MMOL/L (ref 21–32)
CREAT SERPL-MCNC: 5 MG/DL (ref 0.9–1.3)
DIFFERENTIAL TYPE: ABNORMAL
EOSINOPHILS ABSOLUTE: 0 K/CU MM
EOSINOPHILS RELATIVE PERCENT: 0.2 % (ref 0–3)
GFR AFRICAN AMERICAN: 15 ML/MIN/1.73M2
GFR NON-AFRICAN AMERICAN: 13 ML/MIN/1.73M2
GLUCOSE BLD-MCNC: 98 MG/DL (ref 70–99)
HCT VFR BLD CALC: 27.7 % (ref 42–52)
HEMOGLOBIN: 8.4 GM/DL (ref 13.5–18)
IMMATURE NEUTROPHIL %: 1 % (ref 0–0.43)
LYMPHOCYTES ABSOLUTE: 0.2 K/CU MM
LYMPHOCYTES RELATIVE PERCENT: 3.8 % (ref 24–44)
MCH RBC QN AUTO: 25.6 PG (ref 27–31)
MCHC RBC AUTO-ENTMCNC: 30.3 % (ref 32–36)
MCV RBC AUTO: 84.5 FL (ref 78–100)
MONOCYTES ABSOLUTE: 0.1 K/CU MM
MONOCYTES RELATIVE PERCENT: 1.2 % (ref 0–4)
NUCLEATED RBC %: 0 %
PDW BLD-RTO: 22.3 % (ref 11.7–14.9)
PHOSPHORUS: 3.1 MG/DL (ref 2.5–4.9)
PLATELET # BLD: 174 K/CU MM (ref 140–440)
PMV BLD AUTO: 11.8 FL (ref 7.5–11.1)
POTASSIUM SERPL-SCNC: 3.8 MMOL/L (ref 3.5–5.1)
RBC # BLD: 3.28 M/CU MM (ref 4.6–6.2)
SEGMENTED NEUTROPHILS ABSOLUTE COUNT: 5.4 K/CU MM
SEGMENTED NEUTROPHILS RELATIVE PERCENT: 93.8 % (ref 36–66)
SODIUM BLD-SCNC: 133 MMOL/L (ref 135–145)
TOTAL IMMATURE NEUTOROPHIL: 0.06 K/CU MM
TOTAL NUCLEATED RBC: 0 K/CU MM
WBC # BLD: 5.7 K/CU MM (ref 4–10.5)

## 2020-02-04 PROCEDURE — 6370000000 HC RX 637 (ALT 250 FOR IP): Performed by: INTERNAL MEDICINE

## 2020-02-04 PROCEDURE — 6370000000 HC RX 637 (ALT 250 FOR IP): Performed by: NURSE PRACTITIONER

## 2020-02-04 PROCEDURE — 6360000002 HC RX W HCPCS: Performed by: INTERNAL MEDICINE

## 2020-02-04 PROCEDURE — 80053 COMPREHEN METABOLIC PANEL: CPT

## 2020-02-04 PROCEDURE — 6360000002 HC RX W HCPCS

## 2020-02-04 PROCEDURE — 2500000003 HC RX 250 WO HCPCS: Performed by: INTERNAL MEDICINE

## 2020-02-04 PROCEDURE — 2580000003 HC RX 258: Performed by: STUDENT IN AN ORGANIZED HEALTH CARE EDUCATION/TRAINING PROGRAM

## 2020-02-04 PROCEDURE — 6370000000 HC RX 637 (ALT 250 FOR IP): Performed by: STUDENT IN AN ORGANIZED HEALTH CARE EDUCATION/TRAINING PROGRAM

## 2020-02-04 PROCEDURE — 6370000000 HC RX 637 (ALT 250 FOR IP): Performed by: HOSPITALIST

## 2020-02-04 PROCEDURE — 99233 SBSQ HOSP IP/OBS HIGH 50: CPT | Performed by: INTERNAL MEDICINE

## 2020-02-04 PROCEDURE — 80069 RENAL FUNCTION PANEL: CPT

## 2020-02-04 PROCEDURE — 85025 COMPLETE CBC W/AUTO DIFF WBC: CPT

## 2020-02-04 PROCEDURE — 94761 N-INVAS EAR/PLS OXIMETRY MLT: CPT

## 2020-02-04 PROCEDURE — 2580000003 HC RX 258: Performed by: INTERNAL MEDICINE

## 2020-02-04 PROCEDURE — C9113 INJ PANTOPRAZOLE SODIUM, VIA: HCPCS | Performed by: INTERNAL MEDICINE

## 2020-02-04 PROCEDURE — 1200000000 HC SEMI PRIVATE

## 2020-02-04 RX ORDER — LORAZEPAM 2 MG/ML
2 INJECTION INTRAMUSCULAR EVERY 6 HOURS PRN
Status: DISCONTINUED | OUTPATIENT
Start: 2020-02-04 | End: 2020-02-04

## 2020-02-04 RX ORDER — PREDNISONE 20 MG/1
20 TABLET ORAL DAILY
Status: DISCONTINUED | OUTPATIENT
Start: 2020-02-04 | End: 2020-02-06 | Stop reason: HOSPADM

## 2020-02-04 RX ORDER — LORAZEPAM 1 MG/1
2 TABLET ORAL EVERY 6 HOURS PRN
Status: DISCONTINUED | OUTPATIENT
Start: 2020-02-04 | End: 2020-02-06 | Stop reason: HOSPADM

## 2020-02-04 RX ORDER — PANTOPRAZOLE SODIUM 40 MG/1
40 TABLET, DELAYED RELEASE ORAL
Status: DISCONTINUED | OUTPATIENT
Start: 2020-02-05 | End: 2020-02-06 | Stop reason: HOSPADM

## 2020-02-04 RX ORDER — LORAZEPAM 2 MG/ML
INJECTION INTRAMUSCULAR
Status: COMPLETED
Start: 2020-02-04 | End: 2020-02-04

## 2020-02-04 RX ADMIN — LORAZEPAM 2 MG: 2 INJECTION INTRAMUSCULAR; INTRAVENOUS at 15:16

## 2020-02-04 RX ADMIN — APIXABAN 5 MG: 5 TABLET, FILM COATED ORAL at 22:09

## 2020-02-04 RX ADMIN — LAMIVUDINE 50 MG: 10 SOLUTION ORAL at 22:11

## 2020-02-04 RX ADMIN — SEVELAMER CARBONATE 800 MG: 800 TABLET, FILM COATED ORAL at 09:57

## 2020-02-04 RX ADMIN — CALCIUM ACETATE 1334 MG: 667 CAPSULE ORAL at 09:57

## 2020-02-04 RX ADMIN — SEVELAMER CARBONATE 800 MG: 800 TABLET, FILM COATED ORAL at 11:57

## 2020-02-04 RX ADMIN — MAGNESIUM HYDROXIDE 30 ML: 400 SUSPENSION ORAL at 01:27

## 2020-02-04 RX ADMIN — CEFTOLOZANE AND TAZOBACTAM 450 MG: 1; .5 INJECTION, POWDER, LYOPHILIZED, FOR SOLUTION INTRAVENOUS at 17:37

## 2020-02-04 RX ADMIN — DOLUTEGRAVIR SODIUM 50 MG: 50 TABLET, FILM COATED ORAL at 22:09

## 2020-02-04 RX ADMIN — CEFTOLOZANE AND TAZOBACTAM 450 MG: 1; .5 INJECTION, POWDER, LYOPHILIZED, FOR SOLUTION INTRAVENOUS at 10:03

## 2020-02-04 RX ADMIN — MIDODRINE HYDROCHLORIDE 10 MG: 5 TABLET ORAL at 09:56

## 2020-02-04 RX ADMIN — METOPROLOL TARTRATE 50 MG: 50 TABLET, FILM COATED ORAL at 22:09

## 2020-02-04 RX ADMIN — PREDNISONE 20 MG: 20 TABLET ORAL at 09:58

## 2020-02-04 RX ADMIN — SULFAMETHOXAZOLE AND TRIMETHOPRIM 328 MG: 80; 16 INJECTION, SOLUTION, CONCENTRATE INTRAVENOUS at 10:07

## 2020-02-04 RX ADMIN — SODIUM CHLORIDE, PRESERVATIVE FREE 10 ML: 5 INJECTION INTRAVENOUS at 22:10

## 2020-02-04 RX ADMIN — SERTRALINE HYDROCHLORIDE 50 MG: 50 TABLET ORAL at 09:57

## 2020-02-04 RX ADMIN — MIDODRINE HYDROCHLORIDE 10 MG: 5 TABLET ORAL at 11:57

## 2020-02-04 RX ADMIN — LINEZOLID 600 MG: 600 INJECTION, SOLUTION INTRAVENOUS at 15:23

## 2020-02-04 RX ADMIN — CEFTOLOZANE AND TAZOBACTAM 450 MG: 1; .5 INJECTION, POWDER, LYOPHILIZED, FOR SOLUTION INTRAVENOUS at 01:42

## 2020-02-04 RX ADMIN — ATORVASTATIN CALCIUM 40 MG: 40 TABLET, FILM COATED ORAL at 22:09

## 2020-02-04 RX ADMIN — ACETAMINOPHEN 650 MG: 325 TABLET ORAL at 00:22

## 2020-02-04 RX ADMIN — PANTOPRAZOLE SODIUM 40 MG: 40 INJECTION, POWDER, FOR SOLUTION INTRAVENOUS at 09:56

## 2020-02-04 RX ADMIN — METRONIDAZOLE 500 MG: 500 INJECTION, SOLUTION INTRAVENOUS at 04:09

## 2020-02-04 RX ADMIN — METRONIDAZOLE 500 MG: 500 INJECTION, SOLUTION INTRAVENOUS at 11:59

## 2020-02-04 RX ADMIN — SODIUM CHLORIDE, PRESERVATIVE FREE 10 ML: 5 INJECTION INTRAVENOUS at 09:56

## 2020-02-04 RX ADMIN — LORAZEPAM 2 MG: 2 INJECTION INTRAMUSCULAR at 15:16

## 2020-02-04 RX ADMIN — LINEZOLID 600 MG: 600 INJECTION, SOLUTION INTRAVENOUS at 00:25

## 2020-02-04 RX ADMIN — APIXABAN 5 MG: 5 TABLET, FILM COATED ORAL at 09:57

## 2020-02-04 RX ADMIN — HYDROXYZINE PAMOATE 25 MG: 25 CAPSULE ORAL at 14:43

## 2020-02-04 RX ADMIN — HYDROXYZINE PAMOATE 25 MG: 25 CAPSULE ORAL at 00:22

## 2020-02-04 RX ADMIN — CALCITRIOL CAPSULES 0.25 MCG 0.5 MCG: 0.25 CAPSULE ORAL at 10:03

## 2020-02-04 RX ADMIN — CALCIUM ACETATE 1334 MG: 667 CAPSULE ORAL at 11:57

## 2020-02-04 RX ADMIN — METOPROLOL TARTRATE 50 MG: 50 TABLET, FILM COATED ORAL at 09:57

## 2020-02-04 RX ADMIN — METRONIDAZOLE 500 MG: 500 INJECTION, SOLUTION INTRAVENOUS at 17:37

## 2020-02-04 ASSESSMENT — PAIN DESCRIPTION - PAIN TYPE
TYPE: ACUTE PAIN
TYPE: ACUTE PAIN

## 2020-02-04 ASSESSMENT — PAIN SCALES - GENERAL
PAINLEVEL_OUTOF10: 0
PAINLEVEL_OUTOF10: 3
PAINLEVEL_OUTOF10: 0

## 2020-02-04 ASSESSMENT — PAIN DESCRIPTION - LOCATION: LOCATION: HEAD

## 2020-02-04 ASSESSMENT — PAIN DESCRIPTION - DESCRIPTORS: DESCRIPTORS: ACHING

## 2020-02-04 NOTE — PROGRESS NOTES
Nephrology Progress Note  2/4/2020 2:58 PM  Subjective:   Admit Date: 1/15/2020  PCP: No primary care provider on file. Interval History:cousin Milady Randhawa [phone]. Pt without sitter today and pleasantly stable and confused at times. On iv abx and dialysis mwf. Overall stable for dc planning if affect improved. Diet: Dietary Nutrition Supplements: Standard High Calorie Oral Supplement  DIET RENAL; Safety Tray; Safety Tray (Disposables)  Pain is: Moderate      Data:   Scheduled Meds:   predniSONE  20 mg Oral Daily    sertraline  50 mg Oral Daily    lamiVUDine  50 mg Oral Daily    dolutegravir sodium  50 mg Oral Daily    chlorproMAZINE  25 mg Oral Once    linezolid  600 mg Intravenous Q12H    metroNIDAZOLE  500 mg Intravenous Q8H    sulfamethoxazole-trimethoprim (BACTRIM) IVPB  5 mg/kg Intravenous Daily    apixaban  5 mg Oral BID    ceftolozane-tazobactam (ZERBAXA) IVPB  450 mg Intravenous Q8H    tenofovir  300 mg Oral Q7 Days    midodrine  10 mg Oral TID WC    pantoprazole  40 mg Intravenous Daily    0.9 % sodium chloride  250 mL Intravenous Once    LORazepam  0.5 mg Intravenous Once    metoprolol tartrate  50 mg Oral BID    calcium acetate  1,334 mg Oral TID WC    sevelamer  800 mg Oral TID WC    calcitRIOL  0.5 mcg Oral Daily    atorvastatin  40 mg Oral Nightly    sodium chloride flush  10 mL Intravenous 2 times per day     Continuous Infusions:   sodium chloride 50 mL/hr at 02/03/20 2310     PRN Meds:acetaminophen, ipratropium-albuterol, diphenhydrAMINE, hydrOXYzine, sodium chloride flush, calcium gluconate IVPB **OR** calcium gluconate IVPB **OR** calcium gluconate IVPB **OR** calcium gluconate IVPB, sodium chloride flush, magnesium hydroxide, ondansetron, acetaminophen  I/O last 3 completed shifts: In: 400   Out: 2943   No intake/output data recorded.   No intake or output data in the 24 hours ending 02/04/20 1458  CBC:   Recent Labs     02/02/20  0640 02/03/20  0600 02/04/20  0540

## 2020-02-04 NOTE — PROGRESS NOTES
Nutrition Assessment    Type and Reason for Visit: Reassess    Nutrition Recommendations:   · Continue current diet and supplements  · Provide appetite stimulant    Nutrition Assessment: Pt intake slightly improved with last doc'd meal at 51-75%. Continues to receive standard nutrition supplements. Suggest starting appetite stimulant as pt likely would not tolerate NG. Malnutrition Assessment:  · Malnutrition Status: Meets the criteria for severe malnutrition  · Context: Chronic illness  · Findings of the 6 clinical characteristics of malnutrition (Minimum of 2 out of 6 clinical characteristics is required to make the diagnosis of moderate or severe Protein Calorie Malnutrition based on AND/ASPEN Guidelines):  1. Energy Intake-Less than or equal to 50% of estimated energy requirement, Greater than or equal to 1 month    2. Weight Loss-Unable to assess, unable to assess  3. Fat Loss-Severe subcutaneous fat loss, Orbital  4. Muscle Loss-Severe muscle mass loss, Clavicles (pectoralis and deltoids)  5. Fluid Accumulation-No significant fluid accumulation, Generalized  6.   Strength-Not measured    Nutrition Risk Level: High    Nutrient Needs:  · Estimated Daily Total Kcal: 8388-8220 based on MSJ  · Estimated Daily Protein (g): 50-75(1-1.5 g/kg IBW )  · Estimated Daily Total Fluid (ml/day): 1 mL/kcal or per nephrology     Nutrition Diagnosis:   · Problem: Severe malnutrition, In context of chronic illness  · Etiology: related to Catabolic illness, Psychological cause/life stress     Signs and symptoms:  as evidenced by Severe loss of subcutaneous fat, Severe muscle loss, Intake 0-25%    Objective Information:  · Wound Type: None  · Current Nutrition Therapies:  · Oral Diet Orders: Renal   · Oral Diet intake: 1-25%, 51-75%  · Oral Nutrition Supplement (ONS) Orders: Standard High Calorie Oral Supplement  · ONS intake: Refused, 1-25%, %  · Anthropometric Measures:  · Ht: 5' 2\" (157.5 cm)   · Current Body

## 2020-02-04 NOTE — PROGRESS NOTES
Perfect serve to Dr Gwendolyn Morales  To report that pt seems confused and he is barely able to stand. Pt is threatening to leave. It was reported that he attempted to pull out his central line earlier today.   Hospitalist has placed pt in a pink slip situation and plans to visit shortly to explain plan of care

## 2020-02-04 NOTE — PROGRESS NOTES
Infectious Disease Progress Note  2020   Patient Name: Maribel Garcia. : 1975   Impression  · Fever  ? Afebrile  ? Probable IRIS, unclear if there is an underlying OI, will treat empirically  ? Blood cx   ? MRI brain showed no space occupying lesion. · ? Maladjustment disorder  ? ? Psychosis  ? R/o CNS process  · AIDS-advanced HIV  ? CD4 count 20, HIV viral load: 1.7 million  ? HIV genotype: No significant resistance  ? Plan to treat with Tenofovir disoproxil fumarate (TDF) weekly, lamivudine (3TC) and dolutegravir (DTG) daily, after TB and other OIs (opportunistic infections) are ruled out  · Cardiomyopathy with valvular dysfunction  ? Systolic dysfunction EF 52-95 %, mitral regurgitation,  ? ? HIV associated cardiomyopathy- myocarditis  · Bilateral pneumonia with hypoxic respiratory failure. ? S/p bronchoscopy with BAL AFB negative  ? After an initial downward trend procalcitonin worsened with the start of cART (combined anti-retroviral therapy). ? Fungitell negative, PJP unlikely   ? Toxoplasma IgG negative  ? Other fungal work up negative. · Pulmonary Embolism- right pulmonary artery  ? Remains on heparin drip  · Acute kidney injury with ATN- HIVAN  ? Now on intermittent HD. ? S/p left renal biopsy with CT showing pararenal fluid likely blood/hematoma  · Past hepatitis B infection  ? Hepatitis B surface antibody positive, anti-HB C antibody positive, hepatitis B surface antigen negative  ? Hepatitis C antibody negative  · Multi-morbidity: per PMHx  Plan:  · D/c linezolid, Zerbaxa, metronidazole  · continue  Bactrim, prednisone  · Continue   mg weekly (post HD), 3TC 150 mg once and then 50 mg daily  and DTG 50 mg daily   · F/u BAL toxoplasma PCR, AFB, fungal  · Advice transfer to [facility] for further testing for OI.    · Trend pct      Ongoing Antimicrobial Therapy  Bactrim    TDF   3TC   DTG     Completed Antimicrobial Therapy  Vancomycin -  Metronidazole Immature Neutrophil 0.06 K/CU MM    Immature Neutrophil % 1.0 (H) 0 - 0.43 %   Renal Function Panel    Collection Time: 02/04/20  5:40 AM   Result Value Ref Range    Sodium 133 (L) 135 - 145 MMOL/L    Potassium 3.8 3.5 - 5.1 MMOL/L    Chloride 94 (L) 99 - 110 mMol/L    CO2 26 21 - 32 MMOL/L    Anion Gap 13 4 - 16    BUN 31 (H) 6 - 23 MG/DL    CREATININE 5.0 (H) 0.9 - 1.3 MG/DL    Glucose 98 70 - 99 MG/DL    Calcium 8.2 (L) 8.3 - 10.6 MG/DL    GFR Non- 13 (L) >60 mL/min/1.73m2    GFR  15 (L) >60 mL/min/1.73m2    Alb 2.5 (L) 3.4 - 5.0 GM/DL    Phosphorus 3.1 2.5 - 4.9 MG/DL     CULTURE results: Invalid input(s): BLOOD CULTURE,  URINE CULTURE, SURGICAL CULTURE    Diagnosis:  Patient Active Problem List   Diagnosis    Acute renal failure (HCC)    Elevated serum creatinine    Pneumonia of both lungs due to infectious organism    AIDS (acquired immune deficiency syndrome) (HCC)    Severe malnutrition (HCC)    Current severe episode of major depressive disorder without psychotic features without prior episode ([facility] [address]       Active Problems  Principal Problem:    AIDS (acquired immune deficiency syndrome) (HCC)  Active Problems:    Current severe episode of major depressive disorder without psychotic features without prior episode (HCC)    Acute renal failure (HCC)    Elevated serum creatinine    Pneumonia of both lungs due to infectious organism    Severe malnutrition (HCC)  Resolved Problems:    * No resolved hospital problems.  *    Electronically signed by: Electronically signed by Julián Ramos MD on 2/4/2020 at 8:53 AM

## 2020-02-04 NOTE — PROGRESS NOTES
Hospitalist Progress Note      Name:  Kristen Richard /Age/Sex: 1975  (45 y.o. male)   MRN & CSN:  [id number] & [id number] Admission Date/Time: 1/15/2020  4:05 AM   Location:  [address] [place] [address] PCP: No primary care provider on file. Hospital Day: 21    History of Present Illness:     Chief Complaint: Kristen Richard is a 45 y.o.  male  who presents with intermittent fever     The patient seen and examined. He is threatening to leave AMA. Started became paranoid saying if he does not get home his daughter will die. He was restrained, pink slipped. Ten point ROS reviewed negative, unless as noted above    Objective: Intake/Output Summary (Last 24 hours) at 2020 1805  Last data filed at 2020 1737  Gross per 24 hour   Intake 900 ml   Output --   Net 900 ml      Vitals:   Vitals:    20 1000   BP: (!) 137/98   Pulse: 80   Resp: 16   Temp: 97.8 °F (36.6 °C)   SpO2: 100%     Physical Exam:   General Appearance: alert and oriented to person. Appears to be paranoid  Cardiovascular: normal rate, regular rhythm, normal S1 and S2, no murmurs, rubs, clicks, or gallops, distal pulses intact, no carotid bruits, no JVD  Pulmonary/Chest: Air entry bilaterally equal.  With there is occasional crackles bilaterally.   Coarse breath sounds bilaterally with occasional wheezing  Abdomen: soft, non-tender, non-distended, normal bowel sounds, no masses   Extremities: no cyanosis, clubbing or edema, pulse   Skin: warm and dry, no rash or erythema  Head: normocephalic and atraumatic  Eyes: pupils equal, round, and reactive to light  Neck: supple and non-tender without mass, no thyromegaly   Musculoskeletal: normal range of motion, no joint swelling, deformity or tenderness  Neurological: alert, oriented, normal speech, appears to be paranoid    Medications:   Medications:    predniSONE  20 mg Oral Daily    [START ON 2020] pantoprazole  40 mg Oral QAM AC    sertraline  50 mg Oral Daily    DWIGHT; please characterize kidneys and screen for obstruction TECHNOLOGIST PROVIDED HISTORY: Reason for exam:->DWIGHT; please characterize kidneys and screen for obstruction Reason for Exam: DWIGHT Acuity: Acute Type of Exam: Initial Additional signs and symptoms: nk Relevant Medical/Surgical History: nk FINDINGS: Kidneys: The right kidney measures 13 cm in length and the left kidney measures 13 cm in length. The kidneys are hyperechoic. There is no hydronephrosis or shadowing stone. Bladder: The urinary bladder is normal and incompletely distended. No hydronephrosis. Ir Nontunneled Vascular Catheter > 5 Years    Result Date: 1/15/2020  PROCEDURE: ULTRASOUND GUIDED VASCULAR ACCESS. FLUOROSCOPY GUIDED PLACEMENT OF A NON-TUNNELED CATHETER. 1/15/2020. HISTORY: ORDERING SYSTEM PROVIDED HISTORY: Renal insufficiency TECHNOLOGIST PROVIDED HISTORY: Reason for exam:->temp cath Acute renal failure, needs dialysis SEDATION: None FLUOROSCOPY DOSE AND TYPE OR TIME AND EXPOSURES: 0.5 minutes, air kerma 8 TECHNIQUE: Informed consent was obtained after a detailed explanation of the procedure including risks, benefits, and alternatives. Universal protocol was observed. The right neck and chest were prepped and draped in sterile fashion using maximum sterile barrier technique. Local anesthesia was achieved with lidocaine. A micropuncture needle was used to access the right internal jugular vein using ultrasound guidance. An ultrasound image demonstrating patency of the vein with needle tip located within it. An image was obtained and stored in PACs. A 0.035 guidewire was used to place a none tunneled triple-lumen HD catheter using fluoroscopic guidance with tip in the right atrium after fascial tract dilation. The catheter flushed easily and there was a good blood return. The catheter was sutured to the skin. The catheter was locked with heparinized saline.  The patient tolerated the procedure well and there were no immediate metoprolol    Seems to be very depressed and becoming paranoid. Initiated transfer to [facility]. Accepting physician asking for ID to clarify the reason for transfer. Diet Dietary Nutrition Supplements: Standard High Calorie Oral Supplement  DIET RENAL; Safety Tray; Safety Tray (Disposables)   DVT Prophylaxis [] Lovenox, [x]  Heparin, [] SCDs, [] Ambulation   GI Prophylaxis [x] PPI,  [] H2 Blocker,  [] Carafate,  [] Diet/Tube Feeds   Code Status Full Code   Disposition Patient requires continued admission due to respiratory failure. Multiple barriers with discharge. ID recommending for transferring patient to [facility].     MDM [] Low, [x] Moderate,[]  High      Electronically signed by Harinder Jeffries MD on 2/4/2020 at 6:05 PM

## 2020-02-05 VITALS
WEIGHT: 149.91 LBS | RESPIRATION RATE: 16 BRPM | OXYGEN SATURATION: 99 % | HEART RATE: 75 BPM | SYSTOLIC BLOOD PRESSURE: 129 MMHG | DIASTOLIC BLOOD PRESSURE: 83 MMHG | HEIGHT: 62 IN | BODY MASS INDEX: 27.59 KG/M2 | TEMPERATURE: 98.4 F

## 2020-02-05 LAB
ALBUMIN SERPL-MCNC: 2.2 GM/DL (ref 3.4–5)
ANION GAP SERPL CALCULATED.3IONS-SCNC: 12 MMOL/L (ref 4–16)
ASPERGILLUS GALACTO AG: NEGATIVE
ASPERGILLUS GALACTO AG: NORMAL
ASPERGILLUS GALACTO AG: NORMAL
ASPERGILLUS GALACTO INDEX: 0.02
BASOPHILS ABSOLUTE: 0 K/CU MM
BASOPHILS RELATIVE PERCENT: 0 % (ref 0–1)
BUN BLDV-MCNC: 41 MG/DL (ref 6–23)
CALCIUM SERPL-MCNC: 8.2 MG/DL (ref 8.3–10.6)
CHLORIDE BLD-SCNC: 93 MMOL/L (ref 99–110)
CO2: 25 MMOL/L (ref 21–32)
CREAT SERPL-MCNC: 5.9 MG/DL (ref 0.9–1.3)
DIFFERENTIAL TYPE: ABNORMAL
EOSINOPHILS ABSOLUTE: 0 K/CU MM
EOSINOPHILS RELATIVE PERCENT: 0.2 % (ref 0–3)
GFR AFRICAN AMERICAN: 13 ML/MIN/1.73M2
GFR NON-AFRICAN AMERICAN: 11 ML/MIN/1.73M2
GLUCOSE BLD-MCNC: 87 MG/DL (ref 70–99)
HCT VFR BLD CALC: 23.5 % (ref 42–52)
HEMOGLOBIN: 7.2 GM/DL (ref 13.5–18)
IMMATURE NEUTROPHIL %: 0.5 % (ref 0–0.43)
LYMPHOCYTES ABSOLUTE: 0.2 K/CU MM
LYMPHOCYTES RELATIVE PERCENT: 5.8 % (ref 24–44)
MCH RBC QN AUTO: 26 PG (ref 27–31)
MCHC RBC AUTO-ENTMCNC: 30.6 % (ref 32–36)
MCV RBC AUTO: 84.8 FL (ref 78–100)
MONOCYTES ABSOLUTE: 0.1 K/CU MM
MONOCYTES RELATIVE PERCENT: 2.4 % (ref 0–4)
NUCLEATED RBC %: 0 %
PDW BLD-RTO: 22.4 % (ref 11.7–14.9)
PHOSPHORUS: 3.6 MG/DL (ref 2.5–4.9)
PLATELET # BLD: 94 K/CU MM (ref 140–440)
PMV BLD AUTO: 12.4 FL (ref 7.5–11.1)
POTASSIUM SERPL-SCNC: 3.4 MMOL/L (ref 3.5–5.1)
RBC # BLD: 2.77 M/CU MM (ref 4.6–6.2)
SEGMENTED NEUTROPHILS ABSOLUTE COUNT: 3.7 K/CU MM
SEGMENTED NEUTROPHILS RELATIVE PERCENT: 91.1 % (ref 36–66)
SODIUM BLD-SCNC: 130 MMOL/L (ref 135–145)
TOTAL IMMATURE NEUTOROPHIL: 0.02 K/CU MM
TOTAL NUCLEATED RBC: 0 K/CU MM
WBC # BLD: 4.1 K/CU MM (ref 4–10.5)

## 2020-02-05 PROCEDURE — 6370000000 HC RX 637 (ALT 250 FOR IP): Performed by: INTERNAL MEDICINE

## 2020-02-05 PROCEDURE — 6360000002 HC RX W HCPCS: Performed by: INTERNAL MEDICINE

## 2020-02-05 PROCEDURE — 6370000000 HC RX 637 (ALT 250 FOR IP): Performed by: HOSPITALIST

## 2020-02-05 PROCEDURE — 6370000000 HC RX 637 (ALT 250 FOR IP): Performed by: STUDENT IN AN ORGANIZED HEALTH CARE EDUCATION/TRAINING PROGRAM

## 2020-02-05 PROCEDURE — 90935 HEMODIALYSIS ONE EVALUATION: CPT

## 2020-02-05 PROCEDURE — 2580000003 HC RX 258: Performed by: INTERNAL MEDICINE

## 2020-02-05 PROCEDURE — 6370000000 HC RX 637 (ALT 250 FOR IP): Performed by: NURSE PRACTITIONER

## 2020-02-05 PROCEDURE — 2580000003 HC RX 258: Performed by: STUDENT IN AN ORGANIZED HEALTH CARE EDUCATION/TRAINING PROGRAM

## 2020-02-05 PROCEDURE — 85025 COMPLETE CBC W/AUTO DIFF WBC: CPT

## 2020-02-05 PROCEDURE — 2500000003 HC RX 250 WO HCPCS: Performed by: INTERNAL MEDICINE

## 2020-02-05 PROCEDURE — 99232 SBSQ HOSP IP/OBS MODERATE 35: CPT | Performed by: INTERNAL MEDICINE

## 2020-02-05 PROCEDURE — 6360000002 HC RX W HCPCS: Performed by: PHYSICIAN ASSISTANT

## 2020-02-05 PROCEDURE — 1200000000 HC SEMI PRIVATE

## 2020-02-05 PROCEDURE — 80069 RENAL FUNCTION PANEL: CPT

## 2020-02-05 RX ORDER — MIDODRINE HYDROCHLORIDE 10 MG/1
10 TABLET ORAL
Qty: 90 TABLET | Refills: 3 | Status: SHIPPED
Start: 2020-02-06

## 2020-02-05 RX ORDER — SEVELAMER CARBONATE 800 MG/1
800 TABLET, FILM COATED ORAL
Qty: 90 TABLET | Refills: 3 | Status: SHIPPED
Start: 2020-02-06

## 2020-02-05 RX ORDER — TENOFOVIR DISOPROXIL FUMARATE 300 MG/1
300 TABLET, FILM COATED ORAL
Qty: 30 TABLET | Refills: 1 | Status: SHIPPED
Start: 2020-02-10

## 2020-02-05 RX ORDER — POTASSIUM CHLORIDE 7.45 MG/ML
10 INJECTION INTRAVENOUS PRN
Status: DISCONTINUED | OUTPATIENT
Start: 2020-02-05 | End: 2020-02-06 | Stop reason: HOSPADM

## 2020-02-05 RX ORDER — METOPROLOL TARTRATE 50 MG/1
50 TABLET, FILM COATED ORAL 2 TIMES DAILY
Qty: 60 TABLET | Refills: 1 | Status: SHIPPED
Start: 2020-02-05

## 2020-02-05 RX ORDER — POTASSIUM CHLORIDE 20 MEQ/1
40 TABLET, EXTENDED RELEASE ORAL PRN
Status: DISCONTINUED | OUTPATIENT
Start: 2020-02-05 | End: 2020-02-06 | Stop reason: HOSPADM

## 2020-02-05 RX ORDER — PREDNISONE 20 MG/1
20 TABLET ORAL DAILY
Qty: 10 TABLET | Refills: 0 | Status: SHIPPED
Start: 2020-02-06 | End: 2020-02-16

## 2020-02-05 RX ORDER — HALOPERIDOL 5 MG/ML
2 INJECTION INTRAMUSCULAR ONCE
Status: COMPLETED | OUTPATIENT
Start: 2020-02-05 | End: 2020-02-05

## 2020-02-05 RX ORDER — SULFAMETHOXAZOLE AND TRIMETHOPRIM 800; 160 MG/1; MG/1
1 TABLET ORAL ONCE
Qty: 1 TABLET | Refills: 0 | Status: SHIPPED
Start: 2020-02-05 | End: 2020-02-05

## 2020-02-05 RX ORDER — LAMIVUDINE 10 MG/ML
50 SOLUTION ORAL DAILY
Qty: 1 BOTTLE | Refills: 1 | Status: SHIPPED
Start: 2020-02-05

## 2020-02-05 RX ORDER — CALCITRIOL 0.5 UG/1
0.5 CAPSULE, LIQUID FILLED ORAL DAILY
Qty: 30 CAPSULE | Refills: 0 | Status: SHIPPED
Start: 2020-02-06

## 2020-02-05 RX ORDER — LORAZEPAM 2 MG/ML
1 INJECTION INTRAMUSCULAR EVERY 6 HOURS PRN
Status: DISCONTINUED | OUTPATIENT
Start: 2020-02-04 | End: 2020-02-06 | Stop reason: HOSPADM

## 2020-02-05 RX ORDER — HEPARIN SODIUM 1000 [USP'U]/ML
3800 INJECTION, SOLUTION INTRAVENOUS; SUBCUTANEOUS
Status: COMPLETED | OUTPATIENT
Start: 2020-02-05 | End: 2020-02-05

## 2020-02-05 RX ADMIN — PANTOPRAZOLE SODIUM 40 MG: 40 TABLET, DELAYED RELEASE ORAL at 17:30

## 2020-02-05 RX ADMIN — DOLUTEGRAVIR SODIUM 50 MG: 50 TABLET, FILM COATED ORAL at 22:35

## 2020-02-05 RX ADMIN — MIDODRINE HYDROCHLORIDE 10 MG: 5 TABLET ORAL at 17:30

## 2020-02-05 RX ADMIN — SERTRALINE HYDROCHLORIDE 50 MG: 50 TABLET ORAL at 17:33

## 2020-02-05 RX ADMIN — SEVELAMER CARBONATE 800 MG: 800 TABLET, FILM COATED ORAL at 17:32

## 2020-02-05 RX ADMIN — LORAZEPAM 1 MG: 2 INJECTION INTRAMUSCULAR; INTRAVENOUS at 23:52

## 2020-02-05 RX ADMIN — APIXABAN 5 MG: 5 TABLET, FILM COATED ORAL at 22:35

## 2020-02-05 RX ADMIN — PREDNISONE 20 MG: 20 TABLET ORAL at 17:31

## 2020-02-05 RX ADMIN — LORAZEPAM 1 MG: 2 INJECTION INTRAMUSCULAR; INTRAVENOUS at 08:59

## 2020-02-05 RX ADMIN — EPOETIN ALFA-EPBX 10000 UNITS: 10000 INJECTION, SOLUTION INTRAVENOUS; SUBCUTANEOUS at 11:39

## 2020-02-05 RX ADMIN — HYDROXYZINE PAMOATE 25 MG: 25 CAPSULE ORAL at 18:47

## 2020-02-05 RX ADMIN — HEPARIN SODIUM 3800 UNITS: 1000 INJECTION, SOLUTION INTRAVENOUS; SUBCUTANEOUS at 12:51

## 2020-02-05 RX ADMIN — CALCIUM ACETATE 1334 MG: 667 CAPSULE ORAL at 17:29

## 2020-02-05 RX ADMIN — LAMIVUDINE 50 MG: 10 SOLUTION ORAL at 22:35

## 2020-02-05 RX ADMIN — SULFAMETHOXAZOLE AND TRIMETHOPRIM 328 MG: 80; 16 INJECTION, SOLUTION, CONCENTRATE INTRAVENOUS at 17:36

## 2020-02-05 RX ADMIN — SODIUM CHLORIDE, PRESERVATIVE FREE 10 ML: 5 INJECTION INTRAVENOUS at 22:36

## 2020-02-05 RX ADMIN — ATORVASTATIN CALCIUM 40 MG: 40 TABLET, FILM COATED ORAL at 22:35

## 2020-02-05 RX ADMIN — HALOPERIDOL LACTATE 2 MG: 5 INJECTION INTRAMUSCULAR at 02:42

## 2020-02-05 RX ADMIN — LORAZEPAM 1 MG: 2 INJECTION INTRAMUSCULAR; INTRAVENOUS at 00:24

## 2020-02-05 RX ADMIN — METOPROLOL TARTRATE 50 MG: 50 TABLET, FILM COATED ORAL at 22:35

## 2020-02-05 RX ADMIN — SODIUM CHLORIDE, PRESERVATIVE FREE 10 ML: 5 INJECTION INTRAVENOUS at 08:59

## 2020-02-05 ASSESSMENT — PAIN SCALES - GENERAL
PAINLEVEL_OUTOF10: 0

## 2020-02-05 NOTE — PROGRESS NOTES
Pt tolerated 3 hr HD treatment well, removing 2 L. No complaints. Retacrit given as ordered. Both Ports on HD CVC heparin locked and capped. Pt removed one arm from restraint. This nurse went to tie restraint back and pt begged to not have it on. Floor RN notified and said it was okay to send pt to floor with the one arm undone. Report given to Floor RN.

## 2020-02-05 NOTE — PROGRESS NOTES
Pt very confused and aggressive tonight and yelling out. Pt also threatened this nurse. Pt was given Haldol 2 mg. Pt now alseep resting comfortably. Will continue to monitor.

## 2020-02-05 NOTE — PLAN OF CARE
Falls - Risk of:  Goal: Will remain free from falls  Description  Will remain free from falls  Outcome: Ongoing  Goal: Absence of physical injury  Description  Absence of physical injury  Outcome: Ongoing     Problem: Nutrition  Goal: Optimal nutrition therapy  Outcome: Ongoing     Problem: Pain:  Goal: Pain level will decrease  Description  Pain level will decrease  Outcome: Ongoing  Goal: Control of acute pain  Description  Control of acute pain  Outcome: Ongoing  Goal: Control of chronic pain  Description  Control of chronic pain  Outcome: Ongoing

## 2020-02-05 NOTE — PROGRESS NOTES
4. 1   HGB 7.8* 8.4* 7.2*    174 94*     BMP:    Recent Labs     02/03/20  0600 02/04/20  0540 02/05/20  0530   * 133* 130*   K 3.9 3.8 3.4*   CL 91* 94* 93*   CO2 24 26 25   BUN 43* 31* 41*   CREATININE 6.5* 5.0* 5.9*   GLUCOSE 92 98 87     Hepatic:   No results for input(s): AST, ALT, ALB, BILITOT, ALKPHOS in the last 72 hours. Troponin: No results for input(s): TROPONINI in the last 72 hours. BNP: No results for input(s): BNP in the last 72 hours. Lipids: No results for input(s): CHOL, HDL in the last 72 hours. Invalid input(s): LDLCALCU  ABGs:   Lab Results   Component Value Date    PO2ART 77 01/19/2020    DSB0SCR 39.0 01/19/2020     INR:   No results for input(s): INR in the last 72 hours. Renal Labs  Albumin:    Lab Results   Component Value Date    LABALBU 2.2 02/05/2020    LABALBU 40 01/15/2020     Calcium:    Lab Results   Component Value Date    CALCIUM 8.2 02/05/2020     Phosphorus:    Lab Results   Component Value Date    PHOS 3.6 02/05/2020     U/A:    Lab Results   Component Value Date    NITRU NEGATIVE 01/15/2020    COLORU YELLOW 01/15/2020    WBCUA 2 01/15/2020    RBCUA 1 01/15/2020    MUCUS RARE 01/15/2020    TRICHOMONAS NONE SEEN 01/15/2020    BACTERIA NEGATIVE 01/15/2020    CLARITYU CLEAR 01/15/2020    SPECGRAV 1.024 01/15/2020    UROBILINOGEN NORMAL 01/15/2020    BILIRUBINUR NEGATIVE 01/15/2020    BLOODU NEGATIVE 01/15/2020    KETUA SMALL 01/15/2020     ABG:    Lab Results   Component Value Date    TAH1BYU 39.0 01/19/2020    PO2ART 77 01/19/2020    APH2KOL 26.5 01/19/2020     HgBA1c:    Lab Results   Component Value Date    LABA1C 6.1 01/15/2020     Microalbumen/Creatinine ratio:  No components found for: RUCREAT          Objective:   Vitals: /85   Pulse 75   Temp 97.6 °F (36.4 °C)   Resp 16   Ht 5' 2\" (1.575 m)   Wt 149 lb 14.6 oz (68 kg)   SpO2 97%   BMI 27.42 kg/m²   General appearance: awake  HEENT: Head: Normal, normocephalic, atraumatic.   Neck: supple, symmetrical, trachea midline  Lungs: diminished breath sounds bilaterally  Heart: S1, S2 normal  Abdomen: abnormal findings:  soft nt  Extremities: edema trace tunnel hd catheter  Neurologic: Mental status: alertness: awake        Patient Active Problem List:     Acute renal failure (HCC)    Assessment and Plan:      IMP:  esrd from hivan  Hypokalemia/hyponatremia  confusion  PE with sob  Anemia  hiv +  Hep B prior infection  hypotension  chf ef 25-30%    Plan     On dialysis mwf with esrd and no sig uop with hiv and tolerate as able  K and na stable with hd  Ca stable and phos stable- monitor with pth 262  Affect still vary and to [facility] with ID eval  On eliquis  prbc if hb < 7 and on epo  Maintain hiv meds  Do dialysis in isolation  bp overall better  Cardiac monitor and volume better    Follow and await Elaine Cross MD

## 2020-02-05 NOTE — PROGRESS NOTES
Infectious Disease Progress Note  2020   Patient Name: Clayton Valente. : 1975   Impression  · Fever  ? Afebrile  ? Blood cx   ? MRI brain showed no space occupying lesion. · ? Maladjustment disorder  ? ? Psychosis  ? R/o CNS process  · AIDS-advanced HIV  ? CD4 count 20, HIV viral load: 1.7 million  ? HIV genotype: No significant resistance  · Cardiomyopathy with valvular dysfunction  ? Systolic dysfunction EF 20-16 %, mitral regurgitation,  ? ? HIV associated cardiomyopathy- myocarditis  · Bilateral pneumonia with hypoxic respiratory failure. ? S/p bronchoscopy with BAL AFB negative  ? After an initial downward trend procalcitonin worsened with the start of cART (combined anti-retroviral therapy). ? Fungitell negative, PJP unlikely   ? Toxoplasma IgG negative  ? Other fungal work up negative. · Pulmonary Embolism- right pulmonary artery  · Acute kidney injury with ATN- HIVAN  ? Now on intermittent HD. ? S/p left renal biopsy with CT showing pararenal fluid likely blood/hematoma  · Past hepatitis B infection  ? Hepatitis B surface antibody positive, anti-HB C antibody positive, hepatitis B surface antigen negative  ? Hepatitis C antibody negative  · Multi-morbidity: per PMHx  Plan:  · Continue Bactrim, prednisone  · Continue   mg weekly (post HD), 3TC 150 mg once and then 50 mg daily  and DTG 50 mg daily   · F/u  AFB, fungal culture      Ongoing Antimicrobial Therapy  Bactrim    TDF   3TC   DTG     Completed Antimicrobial Therapy  Vancomycin -  Metronidazole ? Cefepime -  Doxycycline -  Linezolid -  Zerbaxa -  Metronidazole ; -    History:? Interval history noted, bilateral pneumonia  Afebrile, continues to remain agitated.   Physical Exam:  Vital Signs: /89   Pulse 57   Temp 97.6 °F (36.4 °C) (Axillary)   Resp 16   Ht 5' 2\" (1.575 m)   Wt 155 lb 10.3 oz (70.6 kg)   SpO2 97%   BMI 28.47 kg/m²     Gen: alert and oriented X 2  Skin: no stigmata of endocarditis  Wounds: C/D/I  HEMT: AT/NC Oropharynx pink, moist, and without lesions or exudates; dentition in good state of repair  Eyes: PERRLA, EOMI, conjunctiva pink, sclera anicteric. Neck: Supple. Trachea midline. No LAD. Chest: no distress and CTA. Good air movement. Heart: RRR and no MRG. Abd: soft, non-distended, no tenderness, no hepatomegaly. Normoactive bowel sounds. Ext: no clubbing, cyanosis, or edema  Catheter Site: Right internal jugular CVC, tunneled HDwithout erythema or tenderness  Neuro: Awake, oriented to place and person. No focal neurologic deficit. Radiologic / Imaging / TESTING  No results found.      Labs:    Recent Results (from the past 24 hour(s))   CBC Auto Differential    Collection Time: 02/05/20  5:30 AM   Result Value Ref Range    WBC 4.1 4.0 - 10.5 K/CU MM    RBC 2.77 (L) 4.6 - 6.2 M/CU MM    Hemoglobin 7.2 (L) 13.5 - 18.0 GM/DL    Hematocrit 23.5 (L) 42 - 52 %    MCV 84.8 78 - 100 FL    MCH 26.0 (L) 27 - 31 PG    MCHC 30.6 (L) 32.0 - 36.0 %    RDW 22.4 (H) 11.7 - 14.9 %    Platelets 94 (L) 531 - 440 K/CU MM    MPV 12.4 (H) 7.5 - 11.1 FL    Differential Type AUTOMATED DIFFERENTIAL     Segs Relative 91.1 (H) 36 - 66 %    Lymphocytes % 5.8 (L) 24 - 44 %    Monocytes % 2.4 0 - 4 %    Eosinophils % 0.2 0 - 3 %    Basophils % 0.0 0 - 1 %    Segs Absolute 3.7 K/CU MM    Lymphocytes Absolute 0.2 K/CU MM    Monocytes Absolute 0.1 K/CU MM    Eosinophils Absolute 0.0 K/CU MM    Basophils Absolute 0.0 K/CU MM    Nucleated RBC % 0.0 %    Total Nucleated RBC 0.0 K/CU MM    Total Immature Neutrophil 0.02 K/CU MM    Immature Neutrophil % 0.5 (H) 0 - 0.43 %   Renal Function Panel    Collection Time: 02/05/20  5:30 AM   Result Value Ref Range    Sodium 130 (L) 135 - 145 MMOL/L    Potassium 3.4 (L) 3.5 - 5.1 MMOL/L    Chloride 93 (L) 99 - 110 mMol/L    CO2 25 21 - 32 MMOL/L    Anion Gap 12 4 - 16    BUN 41 (H) 6 - 23 MG/DL    CREATININE 5.9 (H) 0.9 -

## 2020-02-05 NOTE — PROGRESS NOTES
RN called [facility] transfer center for an update. Marichuy Jefferson at transfer center states pt has been accepted by Dr. Emma James. Still waiting for a bed to be assigned. [facility] transfer center will call when a bed is assigned.

## 2020-02-06 NOTE — DISCHARGE SUMMARY
Discharge Summary Note  Patient ID:  Yesenia Aguilar.  [id number]  5 y.o.  1975    Admit date: 1/15/2020    Discharge date and time: No discharge date for patient encounter. Admitting Physician: Rasheeda Singh DO     Discharge Physician: Lynnette Roth MD    Admission Diagnoses:   Cough [R05]  Hyperkalemia [E87.5]  Acute renal failure ([address] [N17.9]  Renal insufficiency [N28.9]  Elevated serum creatinine [R79.89]  History of fever [Z87.898]    Discharge Diagnoses and Hospital Course:   Acute encephalopathy, rule out other infection, questionable HIV encephalitis. MRI brain done 3 days back was negative for any acute abnormality  ID following, recommended transfer to [facility], call placed said would like to have further clarification from ID for transferring out. On empiric antibiotics including linezolid, Zerbaxa, metronidazole     Newly diagnosed HIV infection  CD4 coun 20. On antiretroviral therapy including dolutegravir sodium, lamivu dine, tenofovir. On Bactrim prophylaxis     acute hypoxic respiratory failure secondary to bilateral pneumonia and pulmonary embolism-improving  Extensive airspace disease per CT chest  On Bactrim for  PJP prophylaxis  ID following, d/herrera abx for discharge  ID started on treatment for HIV, started on tenofovir once weekly, lamivudine daily and dolutegravir daily. 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   Continue RRT     Heart failure with reduced ejection fraction, dilated cardiomyopathy with severe mitral regurgitation  On Beta blocker  Can not be on ACEI/ARB due to DWIGHT     Hypothermia-resolved     Hypertension-continue metoprolol    Admission Condition: critical    Discharged Condition: poor    Hospital Course:   51-year-old male presented with acute kidney injury, found to have FSGS related to HIV, started on dialysis. Also found to have PE started on anticoagulation. His CD4 count was 20, ID followed, rule out TB and test for PJP were also negative. He also found to have bilateral lung infiltrates, possibly pneumonia, treated with empiric antibiotics. He also underwent bronchoscopy. His condition improved with pneumonia. He started to develop psychosis on day 19 of hospital admission. He also had psych consulted, started on medication. I recommended transfer to [facility] center likely [address] [place] [address] for further work-up for opportunistic infections and other causes of psychosis. Hence he is being discharged to [address] [place] [address] today.     Consults:   ID, nephrology, pulmonology, psychiatry    Significant Diagnostic Studies:   CBC:         Lab Results   Component Value Date     WBC 5.7 02/04/2020     RBC 3.28 02/04/2020     HGB 8.4 02/04/2020     HCT 27.7 02/04/2020     MCV 84.5 02/04/2020     MCH 25.6 02/04/2020     MCHC 30.3 02/04/2020     RDW 22.3 02/04/2020      02/04/2020     MPV 11.8 02/04/2020      BMP:          Lab Results   Component Value Date      02/04/2020     K 3.8 02/04/2020     CL 94 02/04/2020     CO2 26 02/04/2020     BUN 31 02/04/2020     LABALBU 2.5 02/04/2020     LABALBU 40 01/15/2020     CREATININE 5.0 02/04/2020     CALCIUM 8.2 02/04/2020     GFRAA 15 02/04/2020     LABGLOM 13 02/04/2020     GLUCOSE 98 02/04/2020      Ct Abdomen Pelvis Wo Contrast Additional Contrast? None     Result Date: 1/16/2020  EXAMINATION: CT OF THE ABDOMEN AND PELVIS WITHOUT CONTRAST 1/16/2020 8:10 pm TECHNIQUE: CT of the abdomen and pelvis was performed without the administration of intravenous contrast. Multiplanar reformatted images are provided for review. Dose modulation, iterative reconstruction, and/or weight based adjustment of the mA/kV was utilized to reduce the radiation dose to as low as reasonably achievable. COMPARISON: None. HISTORY: ORDERING SYSTEM PROVIDED HISTORY: evalaute anemia and renal failure TECHNOLOGIST PROVIDED HISTORY: Reason for exam:->evalaute anemia and renal failure Additional Contrast?->None Reason for Exam: evalaute anemia and renal failure, cough Acuity: Unknown Type of Exam: Initial Additional signs and symptoms: none Relevant Medical/Surgical History: no sx FINDINGS: Lower Chest: Mild patchy ground-glass opacity in the right lower lobe with trace layering right pleural effusion. Left lung base clear. Organs: Limited evaluation due lack of intravenous contrast.  Liver, gallbladder, biliary system, pancreas, spleen, adrenal glands, and right kidney are unremarkable. There is a punctate nonobstructing calculus in the otherwise unremarkable left kidney. There is no hydronephrosis or perinephric stranding. GI/Bowel: There is wall thickening of the descending and transverse portions of the duodenum with surrounding inflammatory stranding suboptimally evaluated due to lack of intravenous contrast.  The bowel is otherwise unremarkable. .  The appendix is normal. Pelvis: The urinary bladder is unremarkable. There is no pelvic mass. Peritoneum/Retroperitoneum: Small amount of free fluid in the pelvic cavity. No free air or focal fluid collection. Mildly enlarged retroperitoneal lymph nodes. Abdominal aorta normal in caliber. Bones/Soft Tissues: No acute osseous abnormality. Mild anasarca.      1. Duodenal wall thickening with surrounding stranding concerning for duodenitis. No evidence of perforation. 2. Punctate nonobstructing left renal calculus. No acute obstructive uropathy. 3. Small amount of free fluid in the pelvic cavity is favored reactive.  4. Mildly enlarged retroperitoneal lymph nodes may reflect reactive adenopathy, lymphoproliferative disorder, or metastatic disease. 5. Mild edema in the right lower lobe with trace layering right pleural effusion. 6. Mild anasarca.      Xr Chest Standard (2 Vw)     Result Date: 1/15/2020  EXAMINATION: TWO XRAY VIEWS OF THE CHEST 1/15/2020 5:42 am COMPARISON: None. HISTORY: ORDERING SYSTEM PROVIDED HISTORY: Chest pain TECHNOLOGIST PROVIDED HISTORY: Reason for exam:->Chest pain Reason for Exam: chest pain Acuity: Acute Type of Exam: Initial FINDINGS: The cardiac silhouette is within normal limits for size. There is no focal consolidation, pleural effusion or pneumothorax. The visualized osseous structures demonstrate no acute abnormality.      No acute cardiopulmonary abnormality.      Xr Chest Portable     Result Date: 1/16/2020  EXAMINATION: ONE XRAY VIEW OF THE CHEST 1/15/2020 7:55 am COMPARISON: 01/15/2020. HISTORY: ORDERING SYSTEM PROVIDED HISTORY: SOB TECHNOLOGIST PROVIDED HISTORY: Reason for exam:->SOB Reason for Exam: SOB Acuity: Acute Type of Exam: Subsequent/Follow-up FINDINGS: Interval placement of a right jugular central venous catheter with the tip at the level of the SVC/right atrial junction. No evidence of a pneumothorax. The cardiac silhouette appears within normal limits for size given portable technique. There is no focal consolidation. No pleural effusion or pneumothorax.      1. Right jugular central venous catheter with the tip at the level of the SVC/right atrial junction. 2. No pneumothorax.  3. No focal consolidation.      Us Retroperitoneal Complete     Result Date: 1/15/2020  EXAMINATION: RETROPERITONEAL ULTRASOUND OF THE KIDNEYS AND URINARY BLADDER 1/15/2020 COMPARISON: None HISTORY: ORDERING SYSTEM PROVIDED HISTORY: DWIGHT; please characterize kidneys and screen for obstruction TECHNOLOGIST PROVIDED HISTORY: Reason for exam:->DWIGHT; please characterize kidneys and screen for obstruction Reason for non-tunneled HD catheter placement. Catheter is okay for use. Bronchoscopy      Discharge Exam:  General Appearance: alert and oriented to person. Intermittently confused  Cardiovascular: normal rate, regular rhythm, normal S1 and S2, no murmurs, rubs, clicks, or gallops, distal pulses intact, no carotid bruits, no JVD  Pulmonary/Chest: Air entry bilaterally equal.  With there is occasional crackles bilaterally. Coarse breath sounds bilaterally with occasional wheezing  Abdomen: soft, non-tender, non-distended, normal bowel sounds, no masses   Extremities: no cyanosis, clubbing or edema, pulse   Skin: warm and dry, no rash or erythema  Head: normocephalic and atraumatic  Eyes: pupils equal, round, and reactive to light  Neck: supple and non-tender without mass, no thyromegaly   Musculoskeletal: normal range of motion, no joint swelling, deformity or tenderness  Neurological: alert, oriented, normal speech, appears to be intermittently    Disposition: [facility]itiary center([facility])    Patient Instructions:     Discharge Medications:   Mikki Medina.    Home Medication Instructions UQU:[id number]    Printed on:02/05/20 1028   Medication Information                      acetaminophen (TYLENOL) 325 MG tablet  Take 650 mg by mouth every 6 hours as needed for Pain             apixaban (ELIQUIS) 5 MG TABS tablet  Take 1 tablet by mouth 2 times daily             calcitRIOL (ROCALTROL) 0.5 MCG capsule  Take 1 capsule by mouth daily             calcium acetate (PHOSLO) 667 MG capsule  Take 2 capsules by mouth 3 times daily (with meals)             dolutegravir sodium (TIVICAY) 50 MG tablet  Take 1 tablet by mouth daily             lamiVUDine (EPIVIR) 10 MG/ML solution  Take 5 mLs by mouth daily             metoprolol tartrate (LOPRESSOR) 50 MG tablet  Take 1 tablet by mouth 2 times daily             midodrine (PROAMATINE) 10 MG tablet  Take 1 tablet by mouth 3 times daily (with meals)             predniSONE (DELTASONE) 20

## 2020-02-11 NOTE — H&P
[facility], [facility] Interventional Radiology    Date:2020  Stephanie [address].    :1975   XJ#:[id number]    SEX:male   Referring Physician:  Srinivas Doran  Chief Complaint:  Needs hemodialysis  History of Present Illness:   Needs hemodialysis    HISTORY AND PHYSICAL  Cough [R05]  Hyperkalemia [E87.5]  Acute renal failure (HCC) [N17.9]  Renal insufficiency [N28.9]  Elevated serum creatinine [R79.89]  History of fever [Z87.898]    Past Medical History:  [unfilled]    Past Surgical History:  Past Surgical History:   Procedure Laterality Date    BRONCHOSCOPY Bilateral 2020    BRONCHOSCOPY ALVEOLAR LAVAGE BILATERALLY performed by Kizzy Garcia MD at [facility] ENDOSCOPY       Social History:  Social History     Socioeconomic History    Marital status: Single     Spouse name: Not on file    Number of children: Not on file    Years of education: Not on file    Highest education level: Not on file   Occupational History    Not on file   Social Needs    Financial resource strain: Not on file    Food insecurity:     Worry: Not on file     Inability: Not on file    Transportation needs:     Medical: Not on file     Non-medical: Not on file   Tobacco Use    Smoking status: Never Smoker    Smokeless tobacco: Never Used   Substance and Sexual Activity    Alcohol use: Never     Frequency: Never    Drug use: Never    Sexual activity: Not on file   Lifestyle    Physical activity:     Days per week: Not on file     Minutes per session: Not on file    Stress: Not on file   Relationships    Social connections:     Talks on phone: Not on file     Gets together: Not on file     Attends [employer] service: Not on file     Active member of club or organization: Not on file     Attends meetings of clubs or organizations: Not on file     Relationship status: Not on file    Intimate partner violence:     Fear of current or ex partner: Not on file     Emotionally abused: Not on file     Physically abused: Not on file     Forced

## 2020-02-22 LAB
FINAL RESULT: NORMAL
PRELIMINARY: NORMAL

## 2020-02-23 LAB
FINAL RESULT: NORMAL
FINAL RESULT: NORMAL
PRELIMINARY: NORMAL
PRELIMINARY: NORMAL

## 2020-02-29 LAB
ACID FAST SMEAR: NORMAL
FINAL RESULT: NORMAL
FINAL RESULT: NORMAL
PRELIMINARY: NORMAL
PRELIMINARY: NORMAL

## 2020-03-21 LAB
ACID FAST SMEAR: NORMAL
ACID FAST SMEAR: NORMAL
FINAL RESULT: NORMAL
FINAL RESULT: NORMAL
PRELIMINARY: NORMAL
PRELIMINARY: NORMAL

## 2021-07-28 NOTE — PROGRESS NOTES
Pt now has a bed assigned at [address] [place] [address]. Pt will be going to [facility] on the 9th floor. Room 924 Bed A and report to be called to [phone]. Primary nurse Vika notified. [Antalgic] : antalgic [Stooped] : stooped [de-identified] : Examination of the lumbar spine reveals no midline tenderness palpation, step-offs, or skin lesions. Decreased range of motion with respect to flexion, extension, lateral bending, and rotation. No tenderness to palpation of the sciatic notch. No tenderness palpation of the bilateral greater trochanters. No pain with passive internal/external rotation of the hips. No instability of bilateral lower extremities.  Negative GAL. Negative straight leg raise bilaterally. No bowstring. Negative femoral stretch. 5 out of 5 iliopsoas, hip abductors, hips adductors, quadriceps, hamstrings, gastrocsoleus, tibialis anterior, extensor hallucis longus, peroneals. Grossly intact sensation to light touch bilateral lower extremities. 1+ patellar and Achilles reflexes. Downgoing Babinski. No clonus. Intact proprioception. Palpable pulses. No skin lesion and no edema on the right and left lower extremities. [de-identified] : AP lateral lumbar x-rays with multilevel spondylosis without gross instability or aggressive lesions\par \par Previous lumbar MRI documents significant lumbar stenosis

## 2022-07-04 NOTE — PROGRESS NOTES
Self Test failed 2x, called ABBY support, RN instructed to clean off sensors then CRRT passed self checks. CRRT therapy restarted at 2130. PHYSICAL EXAM: I have reviewed current vital signs.  GENERAL: NAD, well-nourished; well-developed.  HEAD:  Normocephalic, atraumatic.  EYES: EOMI, PERRL, conjunctiva and sclera clear.  ENT: MMM, no erythema/exudates.  NECK: Supple, no JVD.  CHEST/LUNG: Clear to auscultation bilaterally; no wheezes, rales, or rhonchi.  HEART: Regular rate and rhythm, normal S1 and S2; no murmurs, rubs, or gallops.  ABDOMEN: Soft, nontender, nondistended.  EXTREMITIES:  2+ peripheral pulses; no clubbing, cyanosis, or edema.  PSYCH: Cooperative, appropriate, normal mood and affect.  NEUROLOGY: A&O x 3. Motor 5/5. Sensory intact. No focal neurological deficits. CN II - XII intact. (-) dysmetria, facial droop, pronator drift.  SKIn:Medial and lateral linear 8 cm and 9 cm burn respectively well-healing with scab no surrounding edema or drainage Ecchymosis.  Rest of skin Warm and dry.

## (undated) DEVICE — Z DISCONTINUED NO SUB IDED TUBING ETCO2 AD L6.5FT NSL ORAL CVD PRNG NONFLARED TIP OVR

## (undated) DEVICE — AIRLIFE™ NASAL OXYGEN CANNULA CURVED, NONFLARED TIP, WITH 7' FEET (2.1 M) CRUSH RESISTANT TUBING, OVER-THE-EAR STYLE: Brand: AIRLIFE™